# Patient Record
Sex: FEMALE | Race: WHITE | NOT HISPANIC OR LATINO | Employment: OTHER | URBAN - METROPOLITAN AREA
[De-identification: names, ages, dates, MRNs, and addresses within clinical notes are randomized per-mention and may not be internally consistent; named-entity substitution may affect disease eponyms.]

---

## 2023-05-03 PROBLEM — F33.9 RECURRENT MAJOR DEPRESSIVE DISORDER (CMS-HCC): Status: ACTIVE | Noted: 2023-05-03

## 2023-05-03 PROBLEM — E03.9 HYPOTHYROIDISM: Status: ACTIVE | Noted: 2023-05-03

## 2023-05-03 PROBLEM — F41.8 DEPRESSION WITH ANXIETY: Status: ACTIVE | Noted: 2023-05-03

## 2023-05-03 PROBLEM — J44.9 COPD (CHRONIC OBSTRUCTIVE PULMONARY DISEASE) (MULTI): Status: ACTIVE | Noted: 2023-05-03

## 2023-05-03 PROBLEM — R06.02 EXERTIONAL SHORTNESS OF BREATH: Status: ACTIVE | Noted: 2023-05-03

## 2023-05-03 PROBLEM — M81.8 ADULT IDIOPATHIC GENERALIZED OSTEOPOROSIS: Status: ACTIVE | Noted: 2023-05-03

## 2023-05-03 PROBLEM — Q24.8 LEFT VENTRICULAR OUTFLOW TRACT OBSTRUCTION (HHS-HCC): Status: ACTIVE | Noted: 2023-05-03

## 2023-05-03 PROBLEM — C34.90 LUNG CANCER (MULTI): Status: ACTIVE | Noted: 2023-05-03

## 2023-05-03 PROBLEM — E78.00 HYPERCHOLESTEROLEMIA: Status: ACTIVE | Noted: 2023-05-03

## 2023-05-03 PROBLEM — K21.9 GERD (GASTROESOPHAGEAL REFLUX DISEASE): Status: ACTIVE | Noted: 2023-05-03

## 2023-05-03 PROBLEM — M79.7 FIBROMYALGIA: Status: ACTIVE | Noted: 2023-05-03

## 2023-05-03 PROBLEM — M10.9 GOUT: Status: ACTIVE | Noted: 2023-05-03

## 2023-05-03 PROBLEM — R32 URINARY INCONTINENCE: Status: ACTIVE | Noted: 2023-05-03

## 2023-05-03 PROBLEM — E61.1 IRON DEFICIENCY: Status: ACTIVE | Noted: 2023-05-03

## 2023-05-03 PROBLEM — G47.00 INSOMNIA: Status: ACTIVE | Noted: 2023-05-03

## 2023-05-03 RX ORDER — TRAZODONE HYDROCHLORIDE 50 MG/1
1 TABLET ORAL NIGHTLY PRN
COMMUNITY
Start: 2022-06-09 | End: 2023-06-21 | Stop reason: SDUPTHER

## 2023-05-03 RX ORDER — ALBUTEROL SULFATE 0.83 MG/ML
SOLUTION RESPIRATORY (INHALATION)
COMMUNITY
Start: 2022-11-21 | End: 2023-10-04 | Stop reason: ENTERED-IN-ERROR

## 2023-05-03 RX ORDER — FLUTICASONE PROPIONATE 50 MCG
1 SPRAY, SUSPENSION (ML) NASAL 2 TIMES DAILY
COMMUNITY
Start: 2022-05-27 | End: 2023-10-04 | Stop reason: ENTERED-IN-ERROR

## 2023-05-03 RX ORDER — OXYBUTYNIN CHLORIDE 5 MG/1
2 TABLET ORAL DAILY
COMMUNITY
Start: 2022-07-22 | End: 2023-06-16 | Stop reason: SDUPTHER

## 2023-05-03 RX ORDER — POTASSIUM CHLORIDE 600 MG/1
1 CAPSULE, EXTENDED RELEASE ORAL 2 TIMES DAILY
COMMUNITY
Start: 2022-04-21 | End: 2024-03-21 | Stop reason: ALTCHOICE

## 2023-05-03 RX ORDER — FERROUS SULFATE 325(65) MG
1 TABLET ORAL DAILY
COMMUNITY
Start: 2022-04-11 | End: 2023-10-04 | Stop reason: ENTERED-IN-ERROR

## 2023-05-03 RX ORDER — ALLOPURINOL 100 MG/1
1 TABLET ORAL DAILY
COMMUNITY
Start: 2022-09-09 | End: 2023-05-05 | Stop reason: WASHOUT

## 2023-05-03 RX ORDER — ATORVASTATIN CALCIUM 40 MG/1
1 TABLET, FILM COATED ORAL DAILY
COMMUNITY
Start: 2017-02-09 | End: 2023-06-21 | Stop reason: SDUPTHER

## 2023-05-03 RX ORDER — DULOXETIN HYDROCHLORIDE 30 MG/1
1 CAPSULE, DELAYED RELEASE ORAL DAILY
COMMUNITY
Start: 2021-02-16 | End: 2023-06-21 | Stop reason: SDUPTHER

## 2023-05-03 RX ORDER — ALENDRONATE SODIUM 70 MG/1
TABLET ORAL
COMMUNITY
Start: 2018-08-22 | End: 2023-06-21 | Stop reason: SDUPTHER

## 2023-05-03 RX ORDER — OMEPRAZOLE 40 MG/1
1 CAPSULE, DELAYED RELEASE ORAL 2 TIMES DAILY
COMMUNITY
Start: 2022-06-29 | End: 2023-06-21 | Stop reason: SDUPTHER

## 2023-05-03 RX ORDER — ELECTROLYTES/DEXTROSE
1 SOLUTION, ORAL ORAL DAILY
COMMUNITY
Start: 2022-04-11 | End: 2024-03-12 | Stop reason: WASHOUT

## 2023-05-03 RX ORDER — LEVOTHYROXINE SODIUM 125 UG/1
1 TABLET ORAL DAILY
COMMUNITY
Start: 2022-02-16 | End: 2023-06-16 | Stop reason: SDUPTHER

## 2023-05-03 RX ORDER — UMECLIDINIUM BROMIDE AND VILANTEROL TRIFENATATE 62.5; 25 UG/1; UG/1
1 POWDER RESPIRATORY (INHALATION) DAILY
COMMUNITY
Start: 2017-02-09

## 2023-05-03 RX ORDER — METOPROLOL SUCCINATE 50 MG/1
1 TABLET, EXTENDED RELEASE ORAL DAILY
COMMUNITY
Start: 2022-07-07 | End: 2023-11-21 | Stop reason: WASHOUT

## 2023-05-03 RX ORDER — MULTIVITAMIN
1 TABLET ORAL DAILY
COMMUNITY
Start: 2022-04-11 | End: 2024-03-12 | Stop reason: WASHOUT

## 2023-05-03 RX ORDER — GABAPENTIN 300 MG/1
1 CAPSULE ORAL 4 TIMES DAILY
COMMUNITY
Start: 2018-07-23 | End: 2023-06-21 | Stop reason: SDUPTHER

## 2023-05-03 RX ORDER — CHOLECALCIFEROL (VITAMIN D3) 125 MCG
1 CAPSULE ORAL DAILY
COMMUNITY
End: 2024-03-21 | Stop reason: ALTCHOICE

## 2023-05-04 ENCOUNTER — OFFICE VISIT (OUTPATIENT)
Dept: PRIMARY CARE | Facility: CLINIC | Age: 73
End: 2023-05-04
Payer: MEDICARE

## 2023-05-04 VITALS
SYSTOLIC BLOOD PRESSURE: 123 MMHG | DIASTOLIC BLOOD PRESSURE: 81 MMHG | TEMPERATURE: 97.4 F | BODY MASS INDEX: 31.71 KG/M2 | HEART RATE: 86 BPM | WEIGHT: 162.38 LBS

## 2023-05-04 DIAGNOSIS — M79.7 FIBROMYALGIA: ICD-10-CM

## 2023-05-04 DIAGNOSIS — R13.19 ESOPHAGEAL DYSPHAGIA: Primary | ICD-10-CM

## 2023-05-04 DIAGNOSIS — Z00.00 HEALTHCARE MAINTENANCE: ICD-10-CM

## 2023-05-04 DIAGNOSIS — J44.9 CHRONIC OBSTRUCTIVE PULMONARY DISEASE, UNSPECIFIED COPD TYPE (MULTI): ICD-10-CM

## 2023-05-04 DIAGNOSIS — Z00.00 ROUTINE GENERAL MEDICAL EXAMINATION AT HEALTH CARE FACILITY: ICD-10-CM

## 2023-05-04 DIAGNOSIS — E03.9 HYPOTHYROIDISM, UNSPECIFIED TYPE: ICD-10-CM

## 2023-05-04 DIAGNOSIS — M10.9 GOUT, UNSPECIFIED CAUSE, UNSPECIFIED CHRONICITY, UNSPECIFIED SITE: ICD-10-CM

## 2023-05-04 DIAGNOSIS — M81.0 OSTEOPOROSIS, UNSPECIFIED OSTEOPOROSIS TYPE, UNSPECIFIED PATHOLOGICAL FRACTURE PRESENCE: ICD-10-CM

## 2023-05-04 DIAGNOSIS — I48.0 PAROXYSMAL ATRIAL FIBRILLATION (MULTI): ICD-10-CM

## 2023-05-04 DIAGNOSIS — Z12.31 BREAST CANCER SCREENING BY MAMMOGRAM: ICD-10-CM

## 2023-05-04 DIAGNOSIS — C34.91 MALIGNANT NEOPLASM OF RIGHT LUNG, UNSPECIFIED PART OF LUNG (MULTI): ICD-10-CM

## 2023-05-04 DIAGNOSIS — G47.00 INSOMNIA, UNSPECIFIED TYPE: ICD-10-CM

## 2023-05-04 DIAGNOSIS — Z12.39 BREAST SCREENING: ICD-10-CM

## 2023-05-04 DIAGNOSIS — R32 URINARY INCONTINENCE, UNSPECIFIED TYPE: ICD-10-CM

## 2023-05-04 DIAGNOSIS — E78.00 HYPERCHOLESTEROLEMIA: ICD-10-CM

## 2023-05-04 PROBLEM — F33.9 RECURRENT MAJOR DEPRESSIVE DISORDER (CMS-HCC): Status: RESOLVED | Noted: 2023-05-03 | Resolved: 2023-05-04

## 2023-05-04 PROCEDURE — 1159F MED LIST DOCD IN RCRD: CPT | Performed by: INTERNAL MEDICINE

## 2023-05-04 PROCEDURE — 1157F ADVNC CARE PLAN IN RCRD: CPT | Performed by: INTERNAL MEDICINE

## 2023-05-04 PROCEDURE — 1170F FXNL STATUS ASSESSED: CPT | Performed by: INTERNAL MEDICINE

## 2023-05-04 PROCEDURE — 1036F TOBACCO NON-USER: CPT | Performed by: INTERNAL MEDICINE

## 2023-05-04 PROCEDURE — 99214 OFFICE O/P EST MOD 30 MIN: CPT | Performed by: INTERNAL MEDICINE

## 2023-05-04 PROCEDURE — 1160F RVW MEDS BY RX/DR IN RCRD: CPT | Performed by: INTERNAL MEDICINE

## 2023-05-04 PROCEDURE — G0439 PPPS, SUBSEQ VISIT: HCPCS | Performed by: INTERNAL MEDICINE

## 2023-05-04 ASSESSMENT — PATIENT HEALTH QUESTIONNAIRE - PHQ9
1. LITTLE INTEREST OR PLEASURE IN DOING THINGS: NOT AT ALL
SUM OF ALL RESPONSES TO PHQ9 QUESTIONS 1 AND 2: 0
2. FEELING DOWN, DEPRESSED OR HOPELESS: NOT AT ALL

## 2023-05-04 ASSESSMENT — ACTIVITIES OF DAILY LIVING (ADL)
DRESSING: INDEPENDENT
MANAGING_FINANCES: INDEPENDENT
GROCERY_SHOPPING: INDEPENDENT
DOING_HOUSEWORK: INDEPENDENT
BATHING: INDEPENDENT
TAKING_MEDICATION: INDEPENDENT

## 2023-05-04 ASSESSMENT — ENCOUNTER SYMPTOMS
OCCASIONAL FEELINGS OF UNSTEADINESS: 1
LOSS OF SENSATION IN FEET: 0
DEPRESSION: 0

## 2023-05-04 NOTE — ASSESSMENT & PLAN NOTE
On allopurinol 100mg which is controlling her gouty flares, none recent.   Recheck Uric acid levels.

## 2023-05-04 NOTE — ASSESSMENT & PLAN NOTE
On oxybutynin 5mg BID though interested in trial with reduction in frequency given side effects of dry mouth.

## 2023-05-04 NOTE — ASSESSMENT & PLAN NOTE
Followed by Dr. Field with flare in December maintained on anoro ellipta and albuterol. Currently stable, has not had recurrent flares. Still with dyspnea on exertion though improving. Consider followup with him as cardiology workup thus far unrevealijng.

## 2023-05-04 NOTE — PATIENT INSTRUCTIONS
It was a pleasure to see you today! Here is a list of things we have discussed and to follow up on:     Shortness of breath on exertion - consider followup with Dr. Field   Gabapentin - consider reducing dose to 1 tablet in the morning and 1 tablet in the evening.   For sleep - when waking up in the night instead of getting up, try meditation. They have applications on your phone that you can download to help train you with this. Some of these apps are Calm and Headspace (there are multiple others).   Bloodwork - before getting your bloodwork, be sure to be off any biotin containing supplements for 2-3 days.   Followup balance training exercises   Schedule a dentist appointment   Schedule your second COVID bivalent booster.   Consider scheduling with podiatry to cut your nails - I believe this would be covered by your insurance.

## 2023-05-04 NOTE — ASSESSMENT & PLAN NOTE
S/p right lung resection in 2020 s/p chemotherapy has upcoming appointment scheduled for CT chest with IV contrast (every 6 months) followed by Dr. Iglesias

## 2023-05-04 NOTE — ASSESSMENT & PLAN NOTE
">>ASSESSMENT AND PLAN FOR ESOPHAGEAL DYSPHAGIA WRITTEN ON 5/4/2023 11:21 AM BY RASHID CONTRERAS, DO    Workup thus far unremarkable, though EGD notable for retained esophageal fluids, scheduled for manometry next week.   Continues to feel intermittent \"stuck\" sensation every few weeks, no alarm features.   "

## 2023-05-04 NOTE — ASSESSMENT & PLAN NOTE
"Workup thus far unremarkable, though EGD notable for retained esophageal fluids, scheduled for manometry next week.   Continues to feel intermittent \"stuck\" sensation every few weeks, no alarm features.   "

## 2023-05-04 NOTE — PROGRESS NOTES
Subjective   Reason for Visit: Breonna Greenberg is an 72 y.o. female here for a Medicare Wellness visit.   Patient is here for annual wellness visit and follow-up of chronic conditions.  She was last seen in January for establishment of care.   Past Medical, Surgical, and Family History reviewed and updated in chart.     HPI    PMHx:  - COPD advanced and dyspnea on exertion - followed by Dr. Field with flare in December improves with prednisone, on anoro ellipta, albuterol and nebulizers. Notes persistent dyspnea on exertion with prolonged walking, though much improved, has had subseqeunt workup with cardiology reportedly unremarkable.   - Lung cancer (NSCLC - SCC) s/p right lung resection in 2020 with negative findings of recurrence on surveillance imaging complicated by tumor abutting the right atrium, post op pneumonia and post operative pericarditis s/p 4 cycles of adjuvant cisplatin/docetaxel with G-CSF support completed 12/20, last seen by onc Dr. Iglesias in November recommended repeat CT every 6 months with IV contrast to evaluate the hilar lymph nodes and liver parenchyma.  - Residual chest wall pain and fibromyalgia - on gabapentin 100mg followed by pain management   - Paroxysmal AFib precipitated by surgery not recommended AC followed by cardiology last seen in March recommended 6-month follow-up Dr. Carroll, also follows with Dr. Ramos.   - Osteoporosis last DEXA 2018 on alendronate started at that time.  - CAD- LV tract outflow obstruction seen by cardiology in September recommended metoprolol followed by Dr. Sander Carroll   - HTN   - Hypothyroidism on levothyroxine 125mcg   - HLD on atorvastatin 40 mg  - Fibromyalgia, depression and anxiety followed by pain management on cymbalta 30mg and gabapentin 600mg BID   - Insomnia on trazodone 50mg for sleep with limited success, has trouble falling and staying asleep, gets up and has a cup of coffee and snack, plays games and then back to bed. Working on  retraining.   - Right knee crystal arthropathy seen by Dr. Cheng in September s/p steroid injection recommended continuation of allopurinol and voltaren, seen by Dr. Ospina thought related to OA recommended conservative measures. She was at the point where she was unable to ambulate. She continues to experience pain which hurts but its bearable.   - Hiatal hernia and GERD - followed by Dr. Livingston last EGD 6/29/22on omeprazole 40mg   -Esophageal dysphagia to solids and liquids seen by GI recommended manometry study, EGD showing retained esophageal fluid,  -GERD on omeprazole reduced to 40 mg daily  - Urinary urge incontinence on oxybutynin 5mg with significant improvement in symptoms  -Iron deficiency without anemia recommended increased iron intake  - Gout - on allopurinol last flare many years ago.     Lifestyle   - Diet - eats very few carbs except for breakfast, has trouble to lose weight, not on any particular diet but lunches are generally a salad or vegetables. Dinner is some meat and a salad or vegetables. No other snacking, late night eating   - Exercise - involved in senior center, playing chair volleyball and planning to play onkea     Social;   - Lives at home alone   - 3 children, oldest daughter a  in [place], middle daughter an  in [place], youngest son a  in [place]   - Niece an OB/GYN at [facility]   - Started getting back involved with the senior center.       Patient Care Team:  Gloria Mitchell DO as PCP - General  Desiree Chandler MD as PCP - [employer] PCP     Review of Systems  General: No constitutional symptoms, significant changes in weight  HEENT: No headaches, changes in vision or hearing, no sinus or dental issues   Cardiac: No chest pain, palpitations, dyspnea on exertion   Lungs: No cough, wheezing, shortness of breath   Abdomen: No abdominal pain, nausea/vomiting, diarrhea or constipation   : No urinary  complaints   Musculoskeletal: no joint pains,   Heme: No bleeding or thrombosis issues   Lymph: No swollen lymph glands   Skin: No rashes or lesions   Psych: No reported anxiety or depression   All other systems reviewed and are negative       Objective   Vitals:  /81   Pulse 86   Temp 36.3 °C (97.4 °F)   Wt 73.7 kg (162 lb 6 oz)   BMI 31.71 kg/m²       Physical Exam  General: Appears comfortable, NAD, appropriate affect  HEENT: NCAT, EOMI, pupils symmetric, no conjunctival erythema   Neck: Supple, no LAD   Heart: RRR S1 S2 no murmurs appreciated   Lungs: CTA bilaterally, no rhonchi, rales, or wheezes   Abdomen: Soft, NT/ND, no rebound or guarding, NABS   Extremities: no cyanosis or edema appreciated  Neuro: AAO x 3, answers questions appropriately, no FND, gait unremarkable    Assessment/Plan   Problem List Items Addressed This Visit          Nervous    Insomnia    Current Assessment & Plan     Intermittent use of trazodone            Respiratory    COPD (chronic obstructive pulmonary disease) (CMS/[facility])    Current Assessment & Plan     Followed by Dr. Field with flare in December maintained on anoro ellipta and albuterol. Currently stable, has not had recurrent flares. Still with dyspnea on exertion though improving. Consider followup with him as cardiology workup thus far unrevealijng.          Lung cancer (CMS/[facility])    Current Assessment & Plan     S/p right lung resection in 2020 s/p chemotherapy has upcoming appointment scheduled for CT chest with IV contrast (every 6 months) followed by Dr. Iglesias             Circulatory    Paroxysmal atrial fibrillation (CMS/[facility])    Current Assessment & Plan     Precipitated by surgery not recommended anticoagulation followed by cardiology          Relevant Medications    metoprolol succinate XL (Toprol-XL) 50 mg 24 hr tablet       Digestive    Esophageal dysphagia - Primary    Current Assessment & Plan     Workup thus far unremarkable, though EGD notable for  "retained esophageal fluids, scheduled for manometry next week.   Continues to feel intermittent \"stuck\" sensation every few weeks, no alarm features.             Genitourinary    Urinary incontinence    Current Assessment & Plan     On oxybutynin 5mg BID though interested in trial with reduction in frequency given side effects of dry mouth.          Relevant Orders    Urinalysis with Reflex Microscopic       Musculoskeletal    Fibromyalgia    Current Assessment & Plan     Maintained on cymbalta 30mg unclear if helping her symptoms. Denies symptoms of depression.          Osteoporosis    Current Assessment & Plan     On alendronate from 2018 due for repeat bone density          Relevant Orders    XR DEXA bone density    Vitamin D 25-Hydroxy,Total       Endocrine/Metabolic    Hypothyroidism    Current Assessment & Plan     Dose changed to 125mcg at last visit, due for rechecking.          Relevant Orders    TSH with reflex to Free T4 if abnormal       Other    Gout    Current Assessment & Plan     On allopurinol 100mg which is controlling her gouty flares, none recent.   Recheck Uric acid levels.          Relevant Orders    Uric acid    Hypercholesterolemia    Current Assessment & Plan     On atorvastatin 40 mg, repeat lipid profile          Relevant Orders    Lipid Panel    CBC and Auto Differential    Comprehensive Metabolic Panel     Other Visit Diagnoses       Breast screening        Relevant Orders    BI mammo bilateral screening tomosynthesis    Healthcare maintenance        Relevant Orders    Follow Up In [facility] - PCP    Breast cancer screening by mammogram        Relevant Orders    BI mammo bilateral screening tomosynthesis             Health maintenance   - Colonoscopy 3/23  - Mammogram 5/22   - Paps no longer required   - DEXA 8/18   - Immunizations: UTD with flu, pneumococcal, shingrix, due for covid booster.   - Skin - consider derm referral     "

## 2023-06-16 DIAGNOSIS — K21.9 GASTROESOPHAGEAL REFLUX DISEASE, UNSPECIFIED WHETHER ESOPHAGITIS PRESENT: ICD-10-CM

## 2023-06-16 DIAGNOSIS — G47.00 INSOMNIA, UNSPECIFIED TYPE: ICD-10-CM

## 2023-06-16 DIAGNOSIS — E78.00 HYPERCHOLESTEROLEMIA: ICD-10-CM

## 2023-06-16 DIAGNOSIS — E03.9 HYPOTHYROIDISM, UNSPECIFIED TYPE: Primary | ICD-10-CM

## 2023-06-16 DIAGNOSIS — M79.7 FIBROMYALGIA: ICD-10-CM

## 2023-06-16 DIAGNOSIS — R32 URINARY INCONTINENCE, UNSPECIFIED TYPE: ICD-10-CM

## 2023-06-16 DIAGNOSIS — M81.0 OSTEOPOROSIS, UNSPECIFIED OSTEOPOROSIS TYPE, UNSPECIFIED PATHOLOGICAL FRACTURE PRESENCE: ICD-10-CM

## 2023-06-18 RX ORDER — LEVOTHYROXINE SODIUM 125 UG/1
125 TABLET ORAL DAILY
Qty: 30 TABLET | Refills: 0 | Status: SHIPPED | OUTPATIENT
Start: 2023-06-18 | End: 2023-07-01

## 2023-06-18 RX ORDER — OXYBUTYNIN CHLORIDE 5 MG/1
10 TABLET ORAL DAILY
Qty: 90 TABLET | Refills: 1 | Status: SHIPPED | OUTPATIENT
Start: 2023-06-18 | End: 2023-10-05

## 2023-06-21 RX ORDER — ALENDRONATE SODIUM 70 MG/1
70 TABLET ORAL
Qty: 12 TABLET | Refills: 0 | Status: SHIPPED | OUTPATIENT
Start: 2023-06-21 | End: 2023-10-02

## 2023-06-21 RX ORDER — TRAZODONE HYDROCHLORIDE 50 MG/1
50 TABLET ORAL NIGHTLY PRN
Qty: 90 TABLET | Refills: 0 | Status: SHIPPED | OUTPATIENT
Start: 2023-06-21 | End: 2023-08-23 | Stop reason: SDUPTHER

## 2023-06-21 RX ORDER — DULOXETIN HYDROCHLORIDE 30 MG/1
30 CAPSULE, DELAYED RELEASE ORAL DAILY
Qty: 90 CAPSULE | Refills: 1 | Status: SHIPPED | OUTPATIENT
Start: 2023-06-21 | End: 2024-03-01 | Stop reason: SDUPTHER

## 2023-06-21 RX ORDER — ATORVASTATIN CALCIUM 40 MG/1
40 TABLET, FILM COATED ORAL DAILY
Qty: 90 TABLET | Refills: 1 | Status: SHIPPED | OUTPATIENT
Start: 2023-06-21 | End: 2023-08-29

## 2023-06-21 RX ORDER — GABAPENTIN 300 MG/1
300 CAPSULE ORAL 3 TIMES DAILY
Qty: 270 CAPSULE | Refills: 0 | Status: SHIPPED | OUTPATIENT
Start: 2023-06-21 | End: 2023-08-23 | Stop reason: SDUPTHER

## 2023-06-21 RX ORDER — OMEPRAZOLE 40 MG/1
40 CAPSULE, DELAYED RELEASE ORAL 2 TIMES DAILY
Qty: 90 CAPSULE | Refills: 1 | Status: SHIPPED | OUTPATIENT
Start: 2023-06-21 | End: 2023-10-04 | Stop reason: ENTERED-IN-ERROR

## 2023-06-26 ENCOUNTER — LAB (OUTPATIENT)
Dept: LAB | Facility: LAB | Age: 73
End: 2023-06-26
Payer: MEDICARE

## 2023-06-26 DIAGNOSIS — M10.9 GOUT, UNSPECIFIED CAUSE, UNSPECIFIED CHRONICITY, UNSPECIFIED SITE: ICD-10-CM

## 2023-06-26 DIAGNOSIS — E03.9 HYPOTHYROIDISM, UNSPECIFIED TYPE: ICD-10-CM

## 2023-06-26 DIAGNOSIS — M81.0 OSTEOPOROSIS, UNSPECIFIED OSTEOPOROSIS TYPE, UNSPECIFIED PATHOLOGICAL FRACTURE PRESENCE: ICD-10-CM

## 2023-06-26 DIAGNOSIS — R32 URINARY INCONTINENCE, UNSPECIFIED TYPE: ICD-10-CM

## 2023-06-26 DIAGNOSIS — E78.00 HYPERCHOLESTEROLEMIA: ICD-10-CM

## 2023-06-26 LAB
ALANINE AMINOTRANSFERASE (SGPT) (U/L) IN SER/PLAS: 20 U/L (ref 7–45)
ALBUMIN (G/DL) IN SER/PLAS: 4.6 G/DL (ref 3.4–5)
ALKALINE PHOSPHATASE (U/L) IN SER/PLAS: 87 U/L (ref 33–136)
ANION GAP IN SER/PLAS: 14 MMOL/L (ref 10–20)
APPEARANCE, URINE: CLEAR
ASPARTATE AMINOTRANSFERASE (SGOT) (U/L) IN SER/PLAS: 32 U/L (ref 9–39)
BASOPHILS (10*3/UL) IN BLOOD BY AUTOMATED COUNT: 0.04 X10E9/L (ref 0–0.1)
BASOPHILS/100 LEUKOCYTES IN BLOOD BY AUTOMATED COUNT: 0.5 % (ref 0–2)
BILIRUBIN TOTAL (MG/DL) IN SER/PLAS: 0.9 MG/DL (ref 0–1.2)
BILIRUBIN, URINE: NEGATIVE
BLOOD, URINE: ABNORMAL
CALCIDIOL (25 OH VITAMIN D3) (NG/ML) IN SER/PLAS: 67 NG/ML
CALCIUM (MG/DL) IN SER/PLAS: 10.1 MG/DL (ref 8.6–10.6)
CARBON DIOXIDE, TOTAL (MMOL/L) IN SER/PLAS: 30 MMOL/L (ref 21–32)
CHLORIDE (MMOL/L) IN SER/PLAS: 101 MMOL/L (ref 98–107)
CHOLESTEROL (MG/DL) IN SER/PLAS: 212 MG/DL (ref 0–199)
CHOLESTEROL IN HDL (MG/DL) IN SER/PLAS: 63 MG/DL
CHOLESTEROL/HDL RATIO: 3.4
COLOR, URINE: ABNORMAL
CREATININE (MG/DL) IN SER/PLAS: 0.91 MG/DL (ref 0.5–1.05)
EOSINOPHILS (10*3/UL) IN BLOOD BY AUTOMATED COUNT: 0.23 X10E9/L (ref 0–0.4)
EOSINOPHILS/100 LEUKOCYTES IN BLOOD BY AUTOMATED COUNT: 3 % (ref 0–6)
ERYTHROCYTE DISTRIBUTION WIDTH (RATIO) BY AUTOMATED COUNT: 13.3 % (ref 11.5–14.5)
ERYTHROCYTE MEAN CORPUSCULAR HEMOGLOBIN CONCENTRATION (G/DL) BY AUTOMATED: 32.5 G/DL (ref 32–36)
ERYTHROCYTE MEAN CORPUSCULAR VOLUME (FL) BY AUTOMATED COUNT: 92 FL (ref 80–100)
ERYTHROCYTES (10*6/UL) IN BLOOD BY AUTOMATED COUNT: 4.54 X10E12/L (ref 4–5.2)
GFR FEMALE: 67 ML/MIN/1.73M2
GLUCOSE (MG/DL) IN SER/PLAS: 84 MG/DL (ref 74–99)
GLUCOSE, URINE: NEGATIVE MG/DL
HEMATOCRIT (%) IN BLOOD BY AUTOMATED COUNT: 41.9 % (ref 36–46)
HEMOGLOBIN (G/DL) IN BLOOD: 13.6 G/DL (ref 12–16)
IMMATURE GRANULOCYTES/100 LEUKOCYTES IN BLOOD BY AUTOMATED COUNT: 0.7 % (ref 0–0.9)
KETONES, URINE: NEGATIVE MG/DL
LDL: 123 MG/DL (ref 0–99)
LEUKOCYTE ESTERASE, URINE: ABNORMAL
LEUKOCYTES (10*3/UL) IN BLOOD BY AUTOMATED COUNT: 7.6 X10E9/L (ref 4.4–11.3)
LYMPHOCYTES (10*3/UL) IN BLOOD BY AUTOMATED COUNT: 1.2 X10E9/L (ref 0.8–3)
LYMPHOCYTES/100 LEUKOCYTES IN BLOOD BY AUTOMATED COUNT: 15.8 % (ref 13–44)
MONOCYTES (10*3/UL) IN BLOOD BY AUTOMATED COUNT: 0.56 X10E9/L (ref 0.05–0.8)
MONOCYTES/100 LEUKOCYTES IN BLOOD BY AUTOMATED COUNT: 7.4 % (ref 2–10)
NEUTROPHILS (10*3/UL) IN BLOOD BY AUTOMATED COUNT: 5.5 X10E9/L (ref 1.6–5.5)
NEUTROPHILS/100 LEUKOCYTES IN BLOOD BY AUTOMATED COUNT: 72.6 % (ref 40–80)
NITRITE, URINE: NEGATIVE
NRBC (PER 100 WBCS) BY AUTOMATED COUNT: 0 /100 WBC (ref 0–0)
PH, URINE: 6 (ref 5–8)
PLATELETS (10*3/UL) IN BLOOD AUTOMATED COUNT: 199 X10E9/L (ref 150–450)
POTASSIUM (MMOL/L) IN SER/PLAS: 4.5 MMOL/L (ref 3.5–5.3)
PROTEIN TOTAL: 7.6 G/DL (ref 6.4–8.2)
PROTEIN, URINE: NEGATIVE MG/DL
RBC, URINE: <1 /HPF (ref 0–5)
SODIUM (MMOL/L) IN SER/PLAS: 140 MMOL/L (ref 136–145)
SPECIFIC GRAVITY, URINE: 1 (ref 1–1.03)
SQUAMOUS EPITHELIAL CELLS, URINE: <1 /HPF
THYROTROPIN (MIU/L) IN SER/PLAS BY DETECTION LIMIT <= 0.05 MIU/L: 21.78 MIU/L (ref 0.44–3.98)
THYROTROPIN (MIU/L) IN SER/PLAS BY DETECTION LIMIT <= 0.05 MIU/L: NORMAL
THYROXINE (T4) FREE (NG/DL) IN SER/PLAS: 1.14 NG/DL (ref 0.78–1.48)
TRIGLYCERIDE (MG/DL) IN SER/PLAS: 132 MG/DL (ref 0–149)
URATE (MG/DL) IN SER/PLAS: 5.9 MG/DL (ref 2.3–6.7)
UREA NITROGEN (MG/DL) IN SER/PLAS: 18 MG/DL (ref 6–23)
UROBILINOGEN, URINE: <2 MG/DL (ref 0–1.9)
VLDL: 26 MG/DL (ref 0–40)
WBC, URINE: <1 /HPF (ref 0–5)

## 2023-06-26 PROCEDURE — 80053 COMPREHEN METABOLIC PANEL: CPT

## 2023-06-26 PROCEDURE — 81001 URINALYSIS AUTO W/SCOPE: CPT

## 2023-06-26 PROCEDURE — 84439 ASSAY OF FREE THYROXINE: CPT

## 2023-06-26 PROCEDURE — 82306 VITAMIN D 25 HYDROXY: CPT

## 2023-06-26 PROCEDURE — 84443 ASSAY THYROID STIM HORMONE: CPT

## 2023-06-26 PROCEDURE — 80061 LIPID PANEL: CPT

## 2023-06-26 PROCEDURE — 84550 ASSAY OF BLOOD/URIC ACID: CPT

## 2023-06-26 PROCEDURE — 85025 COMPLETE CBC W/AUTO DIFF WBC: CPT

## 2023-06-26 PROCEDURE — 36415 COLL VENOUS BLD VENIPUNCTURE: CPT

## 2023-06-27 LAB — ANTI-NUCLEAR ANTIBODY (ANA): NEGATIVE

## 2023-07-01 DIAGNOSIS — E03.9 HYPOTHYROIDISM, UNSPECIFIED TYPE: Primary | ICD-10-CM

## 2023-07-01 RX ORDER — LEVOTHYROXINE SODIUM 150 UG/1
150 TABLET ORAL DAILY
Qty: 90 TABLET | Refills: 0 | Status: SHIPPED | OUTPATIENT
Start: 2023-07-01 | End: 2023-10-04 | Stop reason: ENTERED-IN-ERROR

## 2023-08-23 DIAGNOSIS — G47.00 INSOMNIA, UNSPECIFIED TYPE: ICD-10-CM

## 2023-08-23 DIAGNOSIS — M79.7 FIBROMYALGIA: ICD-10-CM

## 2023-08-23 RX ORDER — TRAZODONE HYDROCHLORIDE 50 MG/1
50 TABLET ORAL NIGHTLY PRN
Qty: 90 TABLET | Refills: 1 | Status: SHIPPED | OUTPATIENT
Start: 2023-08-23 | End: 2024-04-02 | Stop reason: WASHOUT

## 2023-08-23 RX ORDER — GABAPENTIN 300 MG/1
300 CAPSULE ORAL 2 TIMES DAILY
Qty: 180 CAPSULE | Refills: 1 | Status: SHIPPED | OUTPATIENT
Start: 2023-08-23 | End: 2023-11-21 | Stop reason: WASHOUT

## 2023-08-29 DIAGNOSIS — E78.00 HYPERCHOLESTEROLEMIA: ICD-10-CM

## 2023-08-29 RX ORDER — ATORVASTATIN CALCIUM 40 MG/1
40 TABLET, FILM COATED ORAL DAILY
Qty: 100 TABLET | Refills: 2 | Status: SHIPPED | OUTPATIENT
Start: 2023-08-29 | End: 2024-03-12 | Stop reason: WASHOUT

## 2023-10-01 DIAGNOSIS — M81.0 OSTEOPOROSIS, UNSPECIFIED OSTEOPOROSIS TYPE, UNSPECIFIED PATHOLOGICAL FRACTURE PRESENCE: ICD-10-CM

## 2023-10-02 RX ORDER — ALENDRONATE SODIUM 70 MG/1
TABLET ORAL
Qty: 12 TABLET | Refills: 3 | Status: SHIPPED | OUTPATIENT
Start: 2023-10-02 | End: 2024-03-12 | Stop reason: ALTCHOICE

## 2023-10-04 ENCOUNTER — APPOINTMENT (OUTPATIENT)
Dept: RADIOLOGY | Facility: HOSPITAL | Age: 73
End: 2023-10-04
Payer: MEDICARE

## 2023-10-04 ENCOUNTER — HOSPITAL ENCOUNTER (OUTPATIENT)
Facility: HOSPITAL | Age: 73
Setting detail: OBSERVATION
Discharge: HOME | End: 2023-10-05
Attending: STUDENT IN AN ORGANIZED HEALTH CARE EDUCATION/TRAINING PROGRAM
Payer: MEDICARE

## 2023-10-04 ENCOUNTER — TELEPHONE (OUTPATIENT)
Dept: PULMONOLOGY | Facility: CLINIC | Age: 73
End: 2023-10-04
Payer: MEDICARE

## 2023-10-04 DIAGNOSIS — R91.8 LUNG NODULES: ICD-10-CM

## 2023-10-04 DIAGNOSIS — R06.02 SHORTNESS OF BREATH: Primary | ICD-10-CM

## 2023-10-04 DIAGNOSIS — R91.8 LUNG MASS: ICD-10-CM

## 2023-10-04 LAB
ALBUMIN SERPL BCP-MCNC: 4.5 G/DL (ref 3.4–5)
ALP SERPL-CCNC: 92 U/L (ref 33–136)
ALT SERPL W P-5'-P-CCNC: 23 U/L (ref 7–45)
ANION GAP SERPL CALC-SCNC: 16 MMOL/L (ref 10–20)
AST SERPL W P-5'-P-CCNC: 33 U/L (ref 9–39)
BASE EXCESS BLDV CALC-SCNC: 6.2 MMOL/L (ref -2–3)
BASOPHILS # BLD AUTO: 0.04 X10*3/UL (ref 0–0.1)
BASOPHILS NFR BLD AUTO: 0.4 %
BILIRUB SERPL-MCNC: 0.5 MG/DL (ref 0–1.2)
BNP SERPL-MCNC: 67 PG/ML (ref 0–99)
BODY TEMPERATURE: 37 DEGREES CELSIUS
BUN SERPL-MCNC: 15 MG/DL (ref 6–23)
CALCIUM SERPL-MCNC: 9.7 MG/DL (ref 8.6–10.3)
CARDIAC TROPONIN I PNL SERPL HS: 7 NG/L (ref 0–13)
CARDIAC TROPONIN I PNL SERPL HS: 8 NG/L (ref 0–13)
CHLORIDE SERPL-SCNC: 101 MMOL/L (ref 98–107)
CO2 SERPL-SCNC: 27 MMOL/L (ref 21–32)
CREAT SERPL-MCNC: 0.96 MG/DL (ref 0.5–1.05)
EOSINOPHIL # BLD AUTO: 0.15 X10*3/UL (ref 0–0.4)
EOSINOPHIL NFR BLD AUTO: 1.7 %
ERYTHROCYTE [DISTWIDTH] IN BLOOD BY AUTOMATED COUNT: 13.4 % (ref 11.5–14.5)
GFR SERPL CREATININE-BSD FRML MDRD: 63 ML/MIN/1.73M*2
GLUCOSE SERPL-MCNC: 114 MG/DL (ref 74–99)
HCO3 BLDV-SCNC: 32.7 MMOL/L (ref 22–26)
HCT VFR BLD AUTO: 39.2 % (ref 36–46)
HGB BLD-MCNC: 12.4 G/DL (ref 12–16)
IMM GRANULOCYTES # BLD AUTO: 0.05 X10*3/UL (ref 0–0.5)
IMM GRANULOCYTES NFR BLD AUTO: 0.6 % (ref 0–0.9)
INHALED O2 CONCENTRATION: 21 %
LYMPHOCYTES # BLD AUTO: 1.26 X10*3/UL (ref 0.8–3)
LYMPHOCYTES NFR BLD AUTO: 13.9 %
MCH RBC QN AUTO: 30.1 PG (ref 26–34)
MCHC RBC AUTO-ENTMCNC: 31.6 G/DL (ref 32–36)
MCV RBC AUTO: 95 FL (ref 80–100)
MONOCYTES # BLD AUTO: 0.68 X10*3/UL (ref 0.05–0.8)
MONOCYTES NFR BLD AUTO: 7.5 %
NEUTROPHILS # BLD AUTO: 6.87 X10*3/UL (ref 1.6–5.5)
NEUTROPHILS NFR BLD AUTO: 75.9 %
NRBC BLD-RTO: 0 /100 WBCS (ref 0–0)
OXYHGB MFR BLDV: 41.5 % (ref 45–75)
PCO2 BLDV: 54 MM HG (ref 41–51)
PH BLDV: 7.39 PH (ref 7.33–7.43)
PLATELET # BLD AUTO: 194 X10*3/UL (ref 150–450)
PMV BLD AUTO: 10.8 FL (ref 7.5–11.5)
PO2 BLDV: 30 MM HG (ref 35–45)
POTASSIUM SERPL-SCNC: 3.6 MMOL/L (ref 3.5–5.3)
PROT SERPL-MCNC: 7.7 G/DL (ref 6.4–8.2)
RBC # BLD AUTO: 4.12 X10*6/UL (ref 4–5.2)
SAO2 % BLDV: 42 % (ref 45–75)
SODIUM SERPL-SCNC: 140 MMOL/L (ref 136–145)
TEST COMMENT: ABNORMAL
WBC # BLD AUTO: 9.1 X10*3/UL (ref 4.4–11.3)

## 2023-10-04 PROCEDURE — 83880 ASSAY OF NATRIURETIC PEPTIDE: CPT | Performed by: STUDENT IN AN ORGANIZED HEALTH CARE EDUCATION/TRAINING PROGRAM

## 2023-10-04 PROCEDURE — 99285 EMERGENCY DEPT VISIT HI MDM: CPT | Mod: 25 | Performed by: STUDENT IN AN ORGANIZED HEALTH CARE EDUCATION/TRAINING PROGRAM

## 2023-10-04 PROCEDURE — 2500000004 HC RX 250 GENERAL PHARMACY W/ HCPCS (ALT 636 FOR OP/ED): Performed by: STUDENT IN AN ORGANIZED HEALTH CARE EDUCATION/TRAINING PROGRAM

## 2023-10-04 PROCEDURE — 36415 COLL VENOUS BLD VENIPUNCTURE: CPT | Performed by: STUDENT IN AN ORGANIZED HEALTH CARE EDUCATION/TRAINING PROGRAM

## 2023-10-04 PROCEDURE — 84484 ASSAY OF TROPONIN QUANT: CPT | Performed by: STUDENT IN AN ORGANIZED HEALTH CARE EDUCATION/TRAINING PROGRAM

## 2023-10-04 PROCEDURE — 84484 ASSAY OF TROPONIN QUANT: CPT | Mod: 91 | Performed by: STUDENT IN AN ORGANIZED HEALTH CARE EDUCATION/TRAINING PROGRAM

## 2023-10-04 PROCEDURE — 2550000001 HC RX 255 CONTRASTS: Performed by: STUDENT IN AN ORGANIZED HEALTH CARE EDUCATION/TRAINING PROGRAM

## 2023-10-04 PROCEDURE — G0378 HOSPITAL OBSERVATION PER HR: HCPCS

## 2023-10-04 PROCEDURE — G1004 CDSM NDSC: HCPCS

## 2023-10-04 PROCEDURE — 94640 AIRWAY INHALATION TREATMENT: CPT

## 2023-10-04 PROCEDURE — 2500000002 HC RX 250 W HCPCS SELF ADMINISTERED DRUGS (ALT 637 FOR MEDICARE OP, ALT 636 FOR OP/ED): Performed by: STUDENT IN AN ORGANIZED HEALTH CARE EDUCATION/TRAINING PROGRAM

## 2023-10-04 PROCEDURE — 82805 BLOOD GASES W/O2 SATURATION: CPT | Performed by: STUDENT IN AN ORGANIZED HEALTH CARE EDUCATION/TRAINING PROGRAM

## 2023-10-04 PROCEDURE — 80053 COMPREHEN METABOLIC PANEL: CPT | Performed by: STUDENT IN AN ORGANIZED HEALTH CARE EDUCATION/TRAINING PROGRAM

## 2023-10-04 PROCEDURE — 2500000002 HC RX 250 W HCPCS SELF ADMINISTERED DRUGS (ALT 637 FOR MEDICARE OP, ALT 636 FOR OP/ED): Mod: MUE | Performed by: STUDENT IN AN ORGANIZED HEALTH CARE EDUCATION/TRAINING PROGRAM

## 2023-10-04 PROCEDURE — 71275 CT ANGIOGRAPHY CHEST: CPT | Performed by: STUDENT IN AN ORGANIZED HEALTH CARE EDUCATION/TRAINING PROGRAM

## 2023-10-04 PROCEDURE — 71045 X-RAY EXAM CHEST 1 VIEW: CPT | Mod: FY

## 2023-10-04 PROCEDURE — 85025 COMPLETE CBC W/AUTO DIFF WBC: CPT | Performed by: STUDENT IN AN ORGANIZED HEALTH CARE EDUCATION/TRAINING PROGRAM

## 2023-10-04 PROCEDURE — 71045 X-RAY EXAM CHEST 1 VIEW: CPT | Performed by: RADIOLOGY

## 2023-10-04 RX ORDER — LEVOTHYROXINE SODIUM 125 UG/1
125 TABLET ORAL
COMMUNITY
End: 2024-03-01 | Stop reason: SDUPTHER

## 2023-10-04 RX ORDER — PANTOPRAZOLE SODIUM 20 MG/1
20 TABLET, DELAYED RELEASE ORAL 2 TIMES DAILY
COMMUNITY
End: 2023-11-21 | Stop reason: WASHOUT

## 2023-10-04 RX ORDER — IPRATROPIUM BROMIDE AND ALBUTEROL SULFATE 2.5; .5 MG/3ML; MG/3ML
3 SOLUTION RESPIRATORY (INHALATION) EVERY 20 MIN
Status: COMPLETED | OUTPATIENT
Start: 2023-10-04 | End: 2023-10-04

## 2023-10-04 RX ORDER — PREDNISONE 20 MG/1
60 TABLET ORAL ONCE
Status: COMPLETED | OUTPATIENT
Start: 2023-10-04 | End: 2023-10-04

## 2023-10-04 RX ADMIN — IPRATROPIUM BROMIDE AND ALBUTEROL SULFATE 3 ML: .5; 3 SOLUTION RESPIRATORY (INHALATION) at 18:00

## 2023-10-04 RX ADMIN — PREDNISONE 60 MG: 20 TABLET ORAL at 18:27

## 2023-10-04 RX ADMIN — IPRATROPIUM BROMIDE AND ALBUTEROL SULFATE 3 ML: .5; 3 SOLUTION RESPIRATORY (INHALATION) at 17:50

## 2023-10-04 RX ADMIN — IPRATROPIUM BROMIDE AND ALBUTEROL SULFATE 3 ML: .5; 3 SOLUTION RESPIRATORY (INHALATION) at 18:25

## 2023-10-04 RX ADMIN — IOHEXOL 75 ML: 350 INJECTION, SOLUTION INTRAVENOUS at 18:44

## 2023-10-04 ASSESSMENT — PAIN DESCRIPTION - PROGRESSION: CLINICAL_PROGRESSION: NOT CHANGED

## 2023-10-04 ASSESSMENT — PAIN DESCRIPTION - DESCRIPTORS: DESCRIPTORS: STABBING

## 2023-10-04 ASSESSMENT — COLUMBIA-SUICIDE SEVERITY RATING SCALE - C-SSRS
1. IN THE PAST MONTH, HAVE YOU WISHED YOU WERE DEAD OR WISHED YOU COULD GO TO SLEEP AND NOT WAKE UP?: NO
6. HAVE YOU EVER DONE ANYTHING, STARTED TO DO ANYTHING, OR PREPARED TO DO ANYTHING TO END YOUR LIFE?: NO
2. HAVE YOU ACTUALLY HAD ANY THOUGHTS OF KILLING YOURSELF?: NO

## 2023-10-04 ASSESSMENT — PAIN SCALES - GENERAL
PAINLEVEL_OUTOF10: 0 - NO PAIN
PAINLEVEL_OUTOF10: 0 - NO PAIN

## 2023-10-04 ASSESSMENT — PAIN - FUNCTIONAL ASSESSMENT: PAIN_FUNCTIONAL_ASSESSMENT: 0-10

## 2023-10-04 NOTE — TELEPHONE ENCOUNTER
Patient sts she is having some sob on exertion (short distances), wheezing and coughing up lots of phlegm . Patient sts that her pulse ox has dropped as low as 75.    She has been wearing her O2 at 2lpm even while sitting. She is also currently wearing a heart monitor that has been in place for 2 weeks and comes off on Thursday October 5th.    Please call to advise.

## 2023-10-04 NOTE — ED PROVIDER NOTES
EMERGENCY MEDICINE EVALUATION NOTE    Chief Complaint:   Chief Complaint   Patient presents with    Shortness of Breath     3-4 days ago started with SOB.  Patient reports loss of voice multiple times.  Has seen cardio and gastro last week.   Spoke with pulmonologist today who told her to come in today        HPI: Breonna Greenberg is a 73 y.o. female with past medical history of lung cancer status postchemotherapy who presents with complaint of shortness of breath.  Patient states she has been dealing with intermittent shortness of breath on exertion for the past 3 years.  She states she did have a right lower lobe resection several years ago due to her lung cancer.  She states over the past 3 to 4 days she has had worsening exertional dyspnea and was had with a sudden episode of shortness of breath after walking outside today.  She states she is only able to walk a short distance now and usually is able to walk around a mile.  She does states she used a breathing treatment at home and did have some relief and states she only uses a breathing treatment every once in a while and not a daily occurrence.  She denies any associated chest pain, fever, chills, cough, abdominal pain, nausea, vomiting.    Previous History  No past medical history on file.  Past Surgical History:   Procedure Laterality Date    BACK SURGERY  2013    Back Surgery     SECTION, CLASSIC  2013     Section    CT ANGIO HEART CORONARY  2022    CT HEART CORONARY ANGIOGRAM 2022 [facility]CILLARY [facility]GACY    MR HEAD ANGIO WO IV CONTRAST  3/14/2018    [place] HEAD ANGIO WO [place] [facility] [place]    OTHER SURGICAL HISTORY  2020    Mediastinoscopy    OTHER SURGICAL HISTORY  2020    Thoracotomy    OTHER SURGICAL HISTORY  2020    Lung lobectomy     Social History     Tobacco Use    Smoking status: Never    Smokeless tobacco: Never   Substance Use Topics    Alcohol use: Never    Drug use: Never      No family history on file.  No Known Allergies  Current Outpatient Medications   Medication Instructions    albuterol sulfate (Proair Digihaler) 90 mcg/actuation aero powdr breath act w/sensor inhaler 2 puffs, inhalation, Every 6 hours PRN    alendronate (Fosamax) 70 mg tablet TAKE 1 TABLET BY MOUTH WEEKLY  WITH 8 OZ OF PLAIN WATER 30  MINUTES BEFORE FIRST FOOD, DRINK OR MEDS. STAY UPRIGHT FOR 30  MINS    allopurinol (Zyloprim) 100 mg tablet TAKE 1 TABLET BY MOUTH DAILY AS  DIRECTED    atorvastatin (LIPITOR) 40 mg, oral, Daily    biotin 5 mg capsule 1 capsule, oral, Daily    cholecalciferol (Vitamin D-3) 125 MCG (5000 UT) capsule 1 capsule, oral, Daily    DULoxetine (CYMBALTA) 30 mg, oral, Daily    gabapentin (NEURONTIN) 300 mg, oral, 2 times daily    levothyroxine (SYNTHROID, LEVOXYL) 125 mcg, oral, Daily before breakfast    metoprolol succinate XL (Toprol-XL) 50 mg 24 hr tablet 1 tablet, oral, Daily    multivitamin with folic acid (One Daily Multivitamin) 400 mcg tablet 1 tablet, oral, Daily    oxybutynin (DITROPAN) 10 mg, oral, Daily    pantoprazole (PROTONIX) 20 mg, oral, 2 times daily, Do not crush, chew, or split.    potassium chloride ER (Micro-K) 8 mEq ER capsule 1 capsule, oral, 2 times daily    traZODone (DESYREL) 50 mg, oral, Nightly PRN    umeclidinium-vilanteroL (Anoro Ellipta) 62.5-25 mcg/actuation blister with device 1 puff, inhalation, Daily       Physical Exam  Appearance: Alert, oriented , cooperative,  in acute distress. Well nourished & well hydrated.     Skin: Intact,  dry skin, no lesions, rash, petechiae or purpura.      Eyes: PERRLA, EOMs intact,  Conjunctiva pink with no redness or exudates. Cornea & anterior chamber are clear, Eyelids without lesions. No scleral icterus.      ENT: Hearing grossly intact. External auditory canals patent, tympanic membranes intact with visible landmarks. Nares patent, mucus membranes moist. Dentition without lesions. Pharynx clear, uvula midline.       Neck: Supple, without meningismus. Thyroid not palpable. Trachea at midline. No lymphadenopathy.     Pulmonary: Clear bilaterally with good chest wall excursion, diminished breath sounds in right lower lung fields. No rales, rhonchi or wheezing. No accessory muscle use or stridor.     Cardiac: Normal S1, S2 without murmur, rub, gallop or extrasystole. No JVD, Carotids without bruits.     Abdomen: Soft, nontender, active bowel sounds.  No palpable organomegaly.  No rebound or guarding.  No CVA tenderness.     Genitourinary: Exam deferred.     Musculoskeletal: Full range of motion. no pain, edema, or deformity. Pulses full and equal. No cyanosis or clubbing.      Neurological:  Cranial nerves II through XII are grossly intact, finger-nose touch is normal, normal sensation, no weakness, no focal findings identified.     Psychiatric: Appropriate mood and affect.      Results  Labs Reviewed   CBC WITH AUTO DIFFERENTIAL - Abnormal       Result Value    WBC 9.1      nRBC 0.0      RBC 4.12      Hemoglobin 12.4      Hematocrit 39.2      MCV 95      MCH 30.1      MCHC 31.6 (*)     RDW 13.4      Platelets 194      MPV 10.8      Neutrophils % 75.9      Immature Granulocytes %, Automated 0.6      Lymphocytes % 13.9      Monocytes % 7.5      Eosinophils % 1.7      Basophils % 0.4      Neutrophils Absolute 6.87 (*)     Immature Granulocytes Absolute, Automated 0.05      Lymphocytes Absolute 1.26      Monocytes Absolute 0.68      Eosinophils Absolute 0.15      Basophils Absolute 0.04     COMPREHENSIVE METABOLIC PANEL - Abnormal    Glucose 114 (*)     Sodium 140      Potassium 3.6      Chloride 101      Bicarbonate 27      Anion Gap 16      Urea Nitrogen 15      Creatinine 0.96      eGFR 63      Calcium 9.7      Albumin 4.5      Alkaline Phosphatase 92      Total Protein 7.7      AST 33      Bilirubin, Total 0.5      ALT 23     BLOOD GAS VENOUS - Abnormal    POCT pH, Venous 7.39      POCT pCO2, Venous 54 (*)     POCT pO2, Venous  30 (*)     POCT SO2, Venous 42 (*)     POCT Oxy Hemoglobin, Venous 41.5 (*)     POCT Base Excess, Venous 6.2 (*)     POCT HCO3 Calculated, Venous 32.7 (*)     Patient Temperature 37.0      FiO2 21      Test Comment [facility] LAB2-S     B-TYPE NATRIURETIC PEPTIDE - Normal    BNP 67      Narrative:        <100 pg/mL - Heart failure unlikely  100-299 pg/mL - Intermediate probability of acute heart                  failure exacerbation. Correlate with clinical                  context and patient history.    >=300 pg/mL - Heart Failure likely. Correlate with clinical                  context and patient history.    BNP testing is performed using different testing methodology at [facility] than at other [facility]. Direct result comparisons should only be made within the same method.      SERIAL TROPONIN-INITIAL - Normal    Troponin I, High Sensitivity 8      Narrative:     Less than 99th percentile of normal range cutoff-  Female and children under 18 years old <14 ng/L; Male <21 ng/L: Negative  Repeat testing should be performed if clinically indicated.     Female and children under 18 years old 14-50 ng/L; Male 21-50 ng/L:  Consistent with possible cardiac damage and possible increased clinical   risk. Serial measurements may help to assess extent of myocardial damage.     >50 ng/L: Consistent with cardiac damage, increased clinical risk and  myocardial infarction. Serial measurements may help assess extent of   myocardial damage.      NOTE: Children less than 1 year old may have higher baseline troponin   levels and results should be interpreted in conjunction with the overall   clinical context.     NOTE: Troponin I testing is performed using a different   testing methodology at [facility] than at other   [facility]. Direct result comparisons should only   be made within the same method.   SERIAL TROPONIN, 1 HOUR - Normal    Troponin I, High Sensitivity 7      Narrative:     Less  than 99th percentile of normal range cutoff-  Female and children under 18 years old <14 ng/L; Male <21 ng/L: Negative  Repeat testing should be performed if clinically indicated.     Female and children under 18 years old 14-50 ng/L; Male 21-50 ng/L:  Consistent with possible cardiac damage and possible increased clinical   risk. Serial measurements may help to assess extent of myocardial damage.     >50 ng/L: Consistent with cardiac damage, increased clinical risk and  myocardial infarction. Serial measurements may help assess extent of   myocardial damage.      NOTE: Children less than 1 year old may have higher baseline troponin   levels and results should be interpreted in conjunction with the overall   clinical context.     NOTE: Troponin I testing is performed using a different   testing methodology at [facility] than at other   [facility]. Direct result comparisons should only   be made within the same method.   TROPONIN SERIES- (INITIAL, 1 HR)    Narrative:     The following orders were created for panel order Troponin Series, (0, 1 HR).  Procedure                               Abnormality         Status                     ---------                               -----------         ------                     Troponin I, High Sensiti...[[id number]]  Normal              Final result               Troponin, High Sensitivi...[[id number]]  Normal              Final result                 Please view results for these tests on the individual orders.   GRAY TOP     XR chest 1 view   Final Result   1. Cardiomegaly. Mild interstitial edema.   2. Airspace opacity at the right lung base, probably corresponding to   the spiculated masslike opacity described on earlier same date CT   angiogram chest. PET/CT can be obtained to evaluate for tumor   recurrence.                  MACRO:   None.        Signed by: Venus Payton 10/4/2023 7:11 PM   Dictation workstation:   [id number]      CT angio chest  for pulmonary embolism   Final Result   1. In the interim since prior exam on 05/15/2023, new 2.4 x 2.2 cm   spiculated, masslike opacity is present along the pleura in the   inferior right lung with associated interstitial thickening and micro   nodularity along the periphery. Given history of previous right lung   carcinoma, finding is highly suspicious for local recurrence, until   proven otherwise.   2. Several additional new nodules/increased are present in the   inferior right lung and along the pleura in the left upper and left   lower lobe, nonspecific findings that may represent focal   infectious/inflammatory processes, such as bronchiolitis, although   metastatic disease is not entirely excluded.   3. No pulmonary embolism is identified.   4. No new enlarged lymphadenopathy is identified.   5. Patulous appearance of fluid-filled esophagus, similar in   appearance to prior exam, correlate with dysmotility.             MACRO:   None        Signed by: Seda Recinos 10/4/2023 7:00 PM   Dictation workstation:   [id number]            ED Course & Medical Decision Making  Medications   ipratropium-albuteroL (Duo-Neb) 0.5-2.5 mg/3 mL nebulizer solution 3 mL (3 mL nebulization Given 10/4/23 1825)   predniSONE (Deltasone) tablet 60 mg (60 mg oral Given 10/4/23 1827)   iohexol (OMNIPaque) 350 mg iodine/mL injection 75 mL (75 mL intravenous Given 10/4/23 1844)     Diagnoses as of 10/04/23 2240   Shortness of breath   Lung mass   Lung nodules       Visit Vitals  /77   Pulse 89   Temp 36.9 °C (98.4 °F)   Resp (!) 29       Breonna Greenberg is a 73 y.o. female with past medical history of lung cancer status postchemotherapy who presents with complaint of shortness of breath.  Patient initially tachycardic with a heart rate of 107, afebrile, normotensive, saturating well above 92% on room air.  Clear breath sounds throughout.  Differential does include but is not limited to ACS, PE, dissection, pneumonia,  CHF, malignancy.  EKG did show normal sinus rhythm with a rate of 86 bpm with some T wave inversions and flattening noted in the inferior and anterior leads similar compared to prior EKGs.  Normal BNP noted.  Initial repeat troponin negative.  No significant renal dysfunction, electrolyte normality, leukocytosis, or anemia.  His blood gas did show no significant acidosis although hypercapnia 54 noted.  She was given oral prednisone as well as multiple breathing treatments and did have significant relief in reexamination.  This could be a mild component of COPD although a CT PE protocol was obtained.  This did show evidence of a new 2.4 cm spiculated mass in the pleura along the right lung as well as multiple pulmonary nodules which are new.  No evidence of PE.  She still had exertional dyspnea on reexamination.  Given new onset mass and nodules and concern for metastatic or primary cancer she was admitted to the hospitalist for further management.    Procedures  Procedures    Diagnosis  1. Shortness of breath    2. Lung mass    3. Lung nodules        Disposition    Admitted to hospitalist team, discussed differential and findings with patient as well as any family members at bedside.      ED Prescriptions    None            Bao Toledo DO  10/04/23 1269

## 2023-10-05 VITALS
BODY MASS INDEX: 30.43 KG/M2 | RESPIRATION RATE: 18 BRPM | DIASTOLIC BLOOD PRESSURE: 61 MMHG | HEIGHT: 60 IN | HEART RATE: 101 BPM | WEIGHT: 155 LBS | SYSTOLIC BLOOD PRESSURE: 110 MMHG | OXYGEN SATURATION: 94 % | TEMPERATURE: 98.2 F

## 2023-10-05 DIAGNOSIS — R32 URINARY INCONTINENCE, UNSPECIFIED TYPE: ICD-10-CM

## 2023-10-05 PROBLEM — R06.02 SHORTNESS OF BREATH: Status: ACTIVE | Noted: 2023-10-05

## 2023-10-05 LAB
ANION GAP SERPL CALC-SCNC: 17 MMOL/L (ref 10–20)
BUN SERPL-MCNC: 17 MG/DL (ref 6–23)
CALCIUM SERPL-MCNC: 9.6 MG/DL (ref 8.6–10.3)
CHLORIDE SERPL-SCNC: 101 MMOL/L (ref 98–107)
CO2 SERPL-SCNC: 24 MMOL/L (ref 21–32)
CREAT SERPL-MCNC: 0.83 MG/DL (ref 0.5–1.05)
ERYTHROCYTE [DISTWIDTH] IN BLOOD BY AUTOMATED COUNT: 13.3 % (ref 11.5–14.5)
GFR SERPL CREATININE-BSD FRML MDRD: 75 ML/MIN/1.73M*2
GLUCOSE SERPL-MCNC: 134 MG/DL (ref 74–99)
HCT VFR BLD AUTO: 38.4 % (ref 36–46)
HGB BLD-MCNC: 12.5 G/DL (ref 12–16)
MCH RBC QN AUTO: 30.5 PG (ref 26–34)
MCHC RBC AUTO-ENTMCNC: 32.6 G/DL (ref 32–36)
MCV RBC AUTO: 94 FL (ref 80–100)
NRBC BLD-RTO: 0 /100 WBCS (ref 0–0)
PLATELET # BLD AUTO: 201 X10*3/UL (ref 150–450)
PMV BLD AUTO: 10.3 FL (ref 7.5–11.5)
POTASSIUM SERPL-SCNC: 3.8 MMOL/L (ref 3.5–5.3)
PROCALCITONIN SERPL-MCNC: <0.02 NG/ML
RBC # BLD AUTO: 4.1 X10*6/UL (ref 4–5.2)
SODIUM SERPL-SCNC: 138 MMOL/L (ref 136–145)
WBC # BLD AUTO: 6.4 X10*3/UL (ref 4.4–11.3)

## 2023-10-05 PROCEDURE — 96374 THER/PROPH/DIAG INJ IV PUSH: CPT

## 2023-10-05 PROCEDURE — 94640 AIRWAY INHALATION TREATMENT: CPT

## 2023-10-05 PROCEDURE — 80048 BASIC METABOLIC PNL TOTAL CA: CPT

## 2023-10-05 PROCEDURE — 2500000004 HC RX 250 GENERAL PHARMACY W/ HCPCS (ALT 636 FOR OP/ED)

## 2023-10-05 PROCEDURE — 99232 SBSQ HOSP IP/OBS MODERATE 35: CPT | Performed by: NURSE PRACTITIONER

## 2023-10-05 PROCEDURE — 2500000001 HC RX 250 WO HCPCS SELF ADMINISTERED DRUGS (ALT 637 FOR MEDICARE OP)

## 2023-10-05 PROCEDURE — 85027 COMPLETE CBC AUTOMATED: CPT

## 2023-10-05 PROCEDURE — 2500000002 HC RX 250 W HCPCS SELF ADMINISTERED DRUGS (ALT 637 FOR MEDICARE OP, ALT 636 FOR OP/ED)

## 2023-10-05 PROCEDURE — 36415 COLL VENOUS BLD VENIPUNCTURE: CPT | Mod: AHULAB

## 2023-10-05 PROCEDURE — 84145 PROCALCITONIN (PCT): CPT | Mod: AHULAB,CMCLAB

## 2023-10-05 PROCEDURE — G0378 HOSPITAL OBSERVATION PER HR: HCPCS

## 2023-10-05 PROCEDURE — 2500000001 HC RX 250 WO HCPCS SELF ADMINISTERED DRUGS (ALT 637 FOR MEDICARE OP): Performed by: NURSE PRACTITIONER

## 2023-10-05 PROCEDURE — 2500000002 HC RX 250 W HCPCS SELF ADMINISTERED DRUGS (ALT 637 FOR MEDICARE OP, ALT 636 FOR OP/ED): Mod: MUE

## 2023-10-05 PROCEDURE — 99223 1ST HOSP IP/OBS HIGH 75: CPT

## 2023-10-05 PROCEDURE — 2500000004 HC RX 250 GENERAL PHARMACY W/ HCPCS (ALT 636 FOR OP/ED): Mod: MUE

## 2023-10-05 PROCEDURE — 36415 COLL VENOUS BLD VENIPUNCTURE: CPT

## 2023-10-05 RX ORDER — ACETAMINOPHEN 325 MG/1
650 TABLET ORAL EVERY 4 HOURS PRN
Status: DISCONTINUED | OUTPATIENT
Start: 2023-10-05 | End: 2023-10-05 | Stop reason: HOSPADM

## 2023-10-05 RX ORDER — LEVOTHYROXINE SODIUM 125 UG/1
125 TABLET ORAL
Status: DISCONTINUED | OUTPATIENT
Start: 2023-10-05 | End: 2023-10-05 | Stop reason: HOSPADM

## 2023-10-05 RX ORDER — ENOXAPARIN SODIUM 100 MG/ML
40 INJECTION SUBCUTANEOUS EVERY 24 HOURS
Status: DISCONTINUED | OUTPATIENT
Start: 2023-10-05 | End: 2023-10-05 | Stop reason: HOSPADM

## 2023-10-05 RX ORDER — OXYBUTYNIN CHLORIDE 5 MG/1
10 TABLET ORAL DAILY
Status: DISCONTINUED | OUTPATIENT
Start: 2023-10-05 | End: 2023-10-05 | Stop reason: HOSPADM

## 2023-10-05 RX ORDER — TALC
3 POWDER (GRAM) TOPICAL NIGHTLY PRN
Status: DISCONTINUED | OUTPATIENT
Start: 2023-10-05 | End: 2023-10-05 | Stop reason: HOSPADM

## 2023-10-05 RX ORDER — ONDANSETRON HYDROCHLORIDE 2 MG/ML
4 INJECTION, SOLUTION INTRAVENOUS EVERY 8 HOURS PRN
Status: DISCONTINUED | OUTPATIENT
Start: 2023-10-05 | End: 2023-10-05 | Stop reason: HOSPADM

## 2023-10-05 RX ORDER — OXYBUTYNIN CHLORIDE 5 MG/1
5 TABLET ORAL DAILY
Qty: 90 TABLET | Refills: 1 | Status: SHIPPED | OUTPATIENT
Start: 2023-10-05 | End: 2024-01-23

## 2023-10-05 RX ORDER — ALBUTEROL SULFATE 0.83 MG/ML
2.5 SOLUTION RESPIRATORY (INHALATION) EVERY 4 HOURS PRN
Status: DISCONTINUED | OUTPATIENT
Start: 2023-10-05 | End: 2023-10-05 | Stop reason: HOSPADM

## 2023-10-05 RX ORDER — ACETAMINOPHEN 650 MG/1
650 SUPPOSITORY RECTAL EVERY 4 HOURS PRN
Status: DISCONTINUED | OUTPATIENT
Start: 2023-10-05 | End: 2023-10-05 | Stop reason: HOSPADM

## 2023-10-05 RX ORDER — ALLOPURINOL 100 MG/1
100 TABLET ORAL DAILY
Status: DISCONTINUED | OUTPATIENT
Start: 2023-10-05 | End: 2023-10-05 | Stop reason: HOSPADM

## 2023-10-05 RX ORDER — METOPROLOL SUCCINATE 50 MG/1
50 TABLET, EXTENDED RELEASE ORAL DAILY
Status: DISCONTINUED | OUTPATIENT
Start: 2023-10-05 | End: 2023-10-05 | Stop reason: HOSPADM

## 2023-10-05 RX ORDER — ALBUTEROL SULFATE 90 UG/1
2 AEROSOL, METERED RESPIRATORY (INHALATION) EVERY 4 HOURS PRN
Status: DISCONTINUED | OUTPATIENT
Start: 2023-10-05 | End: 2023-10-05 | Stop reason: CLARIF

## 2023-10-05 RX ORDER — IPRATROPIUM BROMIDE AND ALBUTEROL SULFATE 2.5; .5 MG/3ML; MG/3ML
3 SOLUTION RESPIRATORY (INHALATION)
Status: DISCONTINUED | OUTPATIENT
Start: 2023-10-05 | End: 2023-10-05

## 2023-10-05 RX ORDER — GABAPENTIN 300 MG/1
300 CAPSULE ORAL 2 TIMES DAILY
Status: DISCONTINUED | OUTPATIENT
Start: 2023-10-05 | End: 2023-10-05 | Stop reason: HOSPADM

## 2023-10-05 RX ORDER — ATORVASTATIN CALCIUM 40 MG/1
40 TABLET, FILM COATED ORAL DAILY
Status: DISCONTINUED | OUTPATIENT
Start: 2023-10-05 | End: 2023-10-05 | Stop reason: HOSPADM

## 2023-10-05 RX ORDER — ONDANSETRON 4 MG/1
4 TABLET, ORALLY DISINTEGRATING ORAL EVERY 8 HOURS PRN
Status: DISCONTINUED | OUTPATIENT
Start: 2023-10-05 | End: 2023-10-05 | Stop reason: HOSPADM

## 2023-10-05 RX ORDER — FORMOTEROL FUMARATE DIHYDRATE 20 UG/2ML
20 SOLUTION RESPIRATORY (INHALATION)
Status: DISCONTINUED | OUTPATIENT
Start: 2023-10-05 | End: 2023-10-05 | Stop reason: HOSPADM

## 2023-10-05 RX ORDER — ACETAMINOPHEN 160 MG/5ML
650 SOLUTION ORAL EVERY 4 HOURS PRN
Status: DISCONTINUED | OUTPATIENT
Start: 2023-10-05 | End: 2023-10-05 | Stop reason: HOSPADM

## 2023-10-05 RX ORDER — TRAZODONE HYDROCHLORIDE 50 MG/1
50 TABLET ORAL NIGHTLY PRN
Status: DISCONTINUED | OUTPATIENT
Start: 2023-10-05 | End: 2023-10-05 | Stop reason: HOSPADM

## 2023-10-05 RX ORDER — DULOXETIN HYDROCHLORIDE 30 MG/1
30 CAPSULE, DELAYED RELEASE ORAL DAILY
Status: DISCONTINUED | OUTPATIENT
Start: 2023-10-05 | End: 2023-10-05 | Stop reason: HOSPADM

## 2023-10-05 RX ORDER — POLYETHYLENE GLYCOL 3350 17 G/17G
17 POWDER, FOR SOLUTION ORAL DAILY PRN
Status: DISCONTINUED | OUTPATIENT
Start: 2023-10-05 | End: 2023-10-05 | Stop reason: HOSPADM

## 2023-10-05 RX ORDER — PANTOPRAZOLE SODIUM 20 MG/1
20 TABLET, DELAYED RELEASE ORAL 2 TIMES DAILY
Status: DISCONTINUED | OUTPATIENT
Start: 2023-10-05 | End: 2023-10-05 | Stop reason: HOSPADM

## 2023-10-05 RX ORDER — IPRATROPIUM BROMIDE AND ALBUTEROL SULFATE 2.5; .5 MG/3ML; MG/3ML
3 SOLUTION RESPIRATORY (INHALATION) EVERY 2 HOUR PRN
Status: DISCONTINUED | OUTPATIENT
Start: 2023-10-05 | End: 2023-10-05 | Stop reason: HOSPADM

## 2023-10-05 RX ADMIN — ATORVASTATIN CALCIUM 40 MG: 40 TABLET, FILM COATED ORAL at 10:10

## 2023-10-05 RX ADMIN — METOPROLOL SUCCINATE 50 MG: 50 TABLET, EXTENDED RELEASE ORAL at 10:10

## 2023-10-05 RX ADMIN — FORMOTEROL FUMARATE DIHYDRATE 20 MCG: 20 SOLUTION RESPIRATORY (INHALATION) at 08:00

## 2023-10-05 RX ADMIN — LEVOTHYROXINE SODIUM 125 MCG: 125 TABLET ORAL at 06:50

## 2023-10-05 RX ADMIN — GABAPENTIN 300 MG: 300 CAPSULE ORAL at 10:10

## 2023-10-05 RX ADMIN — DULOXETINE HYDROCHLORIDE 30 MG: 30 CAPSULE, DELAYED RELEASE ORAL at 10:10

## 2023-10-05 RX ADMIN — OXYBUTYNIN CHLORIDE 10 MG: 5 TABLET ORAL at 10:10

## 2023-10-05 RX ADMIN — ACETAMINOPHEN 650 MG: 325 TABLET ORAL at 01:47

## 2023-10-05 RX ADMIN — ENOXAPARIN SODIUM 40 MG: 40 INJECTION SUBCUTANEOUS at 06:50

## 2023-10-05 RX ADMIN — IPRATROPIUM BROMIDE AND ALBUTEROL SULFATE 3 ML: 2.5; .5 SOLUTION RESPIRATORY (INHALATION) at 08:00

## 2023-10-05 RX ADMIN — ALLOPURINOL 100 MG: 100 TABLET ORAL at 10:11

## 2023-10-05 RX ADMIN — METHYLPREDNISOLONE SODIUM SUCCINATE 40 MG: 40 INJECTION, POWDER, FOR SOLUTION INTRAMUSCULAR; INTRAVENOUS at 09:00

## 2023-10-05 RX ADMIN — PANTOPRAZOLE SODIUM 20 MG: 20 TABLET, DELAYED RELEASE ORAL at 10:11

## 2023-10-05 SDOH — ECONOMIC STABILITY: FOOD INSECURITY: WITHIN THE PAST 12 MONTHS, YOU WORRIED THAT YOUR FOOD WOULD RUN OUT BEFORE YOU GOT MONEY TO BUY MORE.: NEVER TRUE

## 2023-10-05 SDOH — SOCIAL STABILITY: SOCIAL INSECURITY: ARE THERE ANY APPARENT SIGNS OF INJURIES/BEHAVIORS THAT COULD BE RELATED TO ABUSE/NEGLECT?: NO

## 2023-10-05 SDOH — SOCIAL STABILITY: SOCIAL INSECURITY: DOES ANYONE TRY TO KEEP YOU FROM HAVING/CONTACTING OTHER FRIENDS OR DOING THINGS OUTSIDE YOUR HOME?: NO

## 2023-10-05 SDOH — ECONOMIC STABILITY: HOUSING INSECURITY
IN THE LAST 12 MONTHS, WAS THERE A TIME WHEN YOU DID NOT HAVE A STEADY PLACE TO SLEEP OR SLEPT IN A SHELTER (INCLUDING NOW)?: NO

## 2023-10-05 SDOH — SOCIAL STABILITY: SOCIAL INSECURITY
WITHIN THE LAST YEAR, HAVE YOU BEEN KICKED, HIT, SLAPPED, OR OTHERWISE PHYSICALLY HURT BY YOUR PARTNER OR EX-PARTNER?: NO

## 2023-10-05 SDOH — HEALTH STABILITY: MENTAL HEALTH: HOW OFTEN DO YOU HAVE A DRINK CONTAINING ALCOHOL?: NEVER

## 2023-10-05 SDOH — SOCIAL STABILITY: SOCIAL NETWORK
IN A TYPICAL WEEK, HOW MANY TIMES DO YOU TALK ON THE PHONE WITH FAMILY, FRIENDS, OR NEIGHBORS?: MORE THAN THREE TIMES A WEEK

## 2023-10-05 SDOH — SOCIAL STABILITY: SOCIAL INSECURITY: WITHIN THE LAST YEAR, HAVE YOU BEEN AFRAID OF YOUR PARTNER OR EX-PARTNER?: NO

## 2023-10-05 SDOH — ECONOMIC STABILITY: FOOD INSECURITY: WITHIN THE PAST 12 MONTHS, THE FOOD YOU BOUGHT JUST DIDN'T LAST AND YOU DIDN'T HAVE MONEY TO GET MORE.: NEVER TRUE

## 2023-10-05 SDOH — ECONOMIC STABILITY: INCOME INSECURITY: IN THE PAST 12 MONTHS, HAS THE ELECTRIC, GAS, OIL, OR WATER COMPANY THREATENED TO SHUT OFF SERVICE IN YOUR HOME?: NO

## 2023-10-05 SDOH — SOCIAL STABILITY: SOCIAL NETWORK: ARE YOU MARRIED, WIDOWED, DIVORCED, SEPARATED, NEVER MARRIED, OR LIVING WITH A PARTNER?: DIVORCED

## 2023-10-05 SDOH — HEALTH STABILITY: MENTAL HEALTH: HOW OFTEN DO YOU HAVE 6 OR MORE DRINKS ON ONE OCCASION?: NEVER

## 2023-10-05 SDOH — ECONOMIC STABILITY: TRANSPORTATION INSECURITY
IN THE PAST 12 MONTHS, HAS LACK OF TRANSPORTATION KEPT YOU FROM MEETINGS, WORK, OR FROM GETTING THINGS NEEDED FOR DAILY LIVING?: NO

## 2023-10-05 SDOH — HEALTH STABILITY: MENTAL HEALTH: HOW MANY STANDARD DRINKS CONTAINING ALCOHOL DO YOU HAVE ON A TYPICAL DAY?: PATIENT DOES NOT DRINK

## 2023-10-05 SDOH — ECONOMIC STABILITY: INCOME INSECURITY: HOW HARD IS IT FOR YOU TO PAY FOR THE VERY BASICS LIKE FOOD, HOUSING, MEDICAL CARE, AND HEATING?: NOT HARD AT ALL

## 2023-10-05 SDOH — HEALTH STABILITY: MENTAL HEALTH
STRESS IS WHEN SOMEONE FEELS TENSE, NERVOUS, ANXIOUS, OR CAN'T SLEEP AT NIGHT BECAUSE THEIR MIND IS TROUBLED. HOW STRESSED ARE YOU?: TO SOME EXTENT

## 2023-10-05 SDOH — HEALTH STABILITY: PHYSICAL HEALTH: ON AVERAGE, HOW MANY DAYS PER WEEK DO YOU ENGAGE IN MODERATE TO STRENUOUS EXERCISE (LIKE A BRISK WALK)?: 3 DAYS

## 2023-10-05 SDOH — SOCIAL STABILITY: SOCIAL INSECURITY
WITHIN THE LAST YEAR, HAVE TO BEEN RAPED OR FORCED TO HAVE ANY KIND OF SEXUAL ACTIVITY BY YOUR PARTNER OR EX-PARTNER?: NO

## 2023-10-05 SDOH — ECONOMIC STABILITY: INCOME INSECURITY: IN THE LAST 12 MONTHS, WAS THERE A TIME WHEN YOU WERE NOT ABLE TO PAY THE MORTGAGE OR RENT ON TIME?: NO

## 2023-10-05 SDOH — SOCIAL STABILITY: SOCIAL INSECURITY: WITHIN THE LAST YEAR, HAVE YOU BEEN HUMILIATED OR EMOTIONALLY ABUSED IN OTHER WAYS BY YOUR PARTNER OR EX-PARTNER?: NO

## 2023-10-05 SDOH — ECONOMIC STABILITY: HOUSING INSECURITY: IN THE LAST 12 MONTHS, HOW MANY PLACES HAVE YOU LIVED?: 1

## 2023-10-05 SDOH — SOCIAL STABILITY: SOCIAL INSECURITY: HAS ANYONE EVER THREATENED TO HURT YOUR FAMILY OR YOUR PETS?: NO

## 2023-10-05 SDOH — HEALTH STABILITY: PHYSICAL HEALTH: ON AVERAGE, HOW MANY MINUTES DO YOU ENGAGE IN EXERCISE AT THIS LEVEL?: 60 MIN

## 2023-10-05 SDOH — ECONOMIC STABILITY: TRANSPORTATION INSECURITY
IN THE PAST 12 MONTHS, HAS THE LACK OF TRANSPORTATION KEPT YOU FROM MEDICAL APPOINTMENTS OR FROM GETTING MEDICATIONS?: NO

## 2023-10-05 SDOH — SOCIAL STABILITY: SOCIAL NETWORK
DO YOU BELONG TO ANY CLUBS OR ORGANIZATIONS SUCH AS CHURCH GROUPS UNIONS, FRATERNAL OR ATHLETIC GROUPS, OR SCHOOL GROUPS?: YES

## 2023-10-05 SDOH — SOCIAL STABILITY: SOCIAL INSECURITY: DO YOU FEEL UNSAFE GOING BACK TO THE PLACE WHERE YOU ARE LIVING?: NO

## 2023-10-05 SDOH — SOCIAL STABILITY: SOCIAL NETWORK: HOW OFTEN DO YOU ATTENT MEETINGS OF THE CLUB OR ORGANIZATION YOU BELONG TO?: MORE THAN 4 TIMES PER YEAR

## 2023-10-05 SDOH — SOCIAL STABILITY: SOCIAL NETWORK: HOW OFTEN DO YOU ATTEND CHURCH OR RELIGIOUS SERVICES?: NEVER

## 2023-10-05 SDOH — SOCIAL STABILITY: SOCIAL INSECURITY: ARE YOU OR HAVE YOU BEEN THREATENED OR ABUSED PHYSICALLY, EMOTIONALLY, OR SEXUALLY BY ANYONE?: NO

## 2023-10-05 SDOH — SOCIAL STABILITY: SOCIAL NETWORK: HOW OFTEN DO YOU GET TOGETHER WITH FRIENDS OR RELATIVES?: MORE THAN THREE TIMES A WEEK

## 2023-10-05 SDOH — SOCIAL STABILITY: SOCIAL INSECURITY: HAVE YOU HAD THOUGHTS OF HARMING ANYONE ELSE?: NO

## 2023-10-05 SDOH — SOCIAL STABILITY: SOCIAL INSECURITY: ABUSE: ADULT

## 2023-10-05 SDOH — SOCIAL STABILITY: SOCIAL INSECURITY: DO YOU FEEL ANYONE HAS EXPLOITED OR TAKEN ADVANTAGE OF YOU FINANCIALLY OR OF YOUR PERSONAL PROPERTY?: NO

## 2023-10-05 ASSESSMENT — ENCOUNTER SYMPTOMS
ABDOMINAL PAIN: 0
SPEECH DIFFICULTY: 0
BLOOD IN STOOL: 0
FREQUENCY: 0
DIFFICULTY URINATING: 0
NAUSEA: 0
RHINORRHEA: 0
DYSURIA: 0
FLANK PAIN: 0
WEAKNESS: 0
POLYDIPSIA: 0
DIZZINESS: 0
WOUND: 0
BRUISES/BLEEDS EASILY: 0
COUGH: 1
SHORTNESS OF BREATH: 1
FEVER: 0
BACK PAIN: 0
DIAPHORESIS: 0
SINUS PRESSURE: 0
HEMATURIA: 0
PALPITATIONS: 0
WHEEZING: 1
VOMITING: 0
TROUBLE SWALLOWING: 0
DIARRHEA: 0
SLEEP DISTURBANCE: 0
CHILLS: 0
SORE THROAT: 0
APPETITE CHANGE: 0
HEADACHES: 0
UNEXPECTED WEIGHT CHANGE: 0
MYALGIAS: 0
FACIAL ASYMMETRY: 0
LIGHT-HEADEDNESS: 0
NERVOUS/ANXIOUS: 0
CONSTIPATION: 0
FATIGUE: 1
ACTIVITY CHANGE: 0
CHEST TIGHTNESS: 0
ABDOMINAL DISTENTION: 0

## 2023-10-05 ASSESSMENT — LIFESTYLE VARIABLES
SUBSTANCE_ABUSE_PAST_12_MONTHS: NO
AUDIT-C TOTAL SCORE: 2
SKIP TO QUESTIONS 9-10: 0
HOW OFTEN DO YOU HAVE A DRINK CONTAINING ALCOHOL: MONTHLY OR LESS
HOW MANY STANDARD DRINKS CONTAINING ALCOHOL DO YOU HAVE ON A TYPICAL DAY: 1 OR 2
HOW OFTEN DO YOU HAVE 6 OR MORE DRINKS ON ONE OCCASION: LESS THAN MONTHLY
SKIP TO QUESTIONS 9-10: 1
AUDIT-C TOTAL SCORE: 0
AUDIT-C TOTAL SCORE: 2
PRESCIPTION_ABUSE_PAST_12_MONTHS: YES

## 2023-10-05 ASSESSMENT — ACTIVITIES OF DAILY LIVING (ADL)
TOILETING: INDEPENDENT
FEEDING YOURSELF: INDEPENDENT
ASSISTIVE_DEVICE: EYEGLASSES
BATHING: INDEPENDENT
JUDGMENT_ADEQUATE_SAFELY_COMPLETE_DAILY_ACTIVITIES: YES
HEARING - LEFT EAR: FUNCTIONAL
WALKS IN HOME: INDEPENDENT
PATIENT'S MEMORY ADEQUATE TO SAFELY COMPLETE DAILY ACTIVITIES?: YES
ADEQUATE_TO_COMPLETE_ADL: YES
HEARING - RIGHT EAR: FUNCTIONAL
GROOMING: INDEPENDENT
DRESSING YOURSELF: INDEPENDENT

## 2023-10-05 ASSESSMENT — COGNITIVE AND FUNCTIONAL STATUS - GENERAL
MOBILITY SCORE: 23
CLIMB 3 TO 5 STEPS WITH RAILING: A LITTLE
DAILY ACTIVITIY SCORE: 24

## 2023-10-05 ASSESSMENT — PAIN SCALES - GENERAL
PAINLEVEL_OUTOF10: 0 - NO PAIN
PAINLEVEL_OUTOF10: 7

## 2023-10-05 ASSESSMENT — PATIENT HEALTH QUESTIONNAIRE - PHQ9
SUM OF ALL RESPONSES TO PHQ9 QUESTIONS 1 & 2: 0
2. FEELING DOWN, DEPRESSED OR HOPELESS: NOT AT ALL
1. LITTLE INTEREST OR PLEASURE IN DOING THINGS: NOT AT ALL

## 2023-10-05 ASSESSMENT — PAIN - FUNCTIONAL ASSESSMENT: PAIN_FUNCTIONAL_ASSESSMENT: 0-10

## 2023-10-05 NOTE — PROGRESS NOTES
Transitional Care Coordination Progress Note:  Plan per Medical/Surgical team: treatment of SOB with pulm consult  Status:observation  Payor source: [employer] medicare ADV  Discharge disposition: home alone, has PRN oxygen set up @ home  Potential Barriers: patient wants PETSCAN ?outpatient  ADOD: 10/5/2023  AURA Parekh RN, BSN Transitional Care Coordinator ED# [phone]      10/05/23 0901   Discharge Planning   Living Arrangements Alone   Support Systems Children;Friends/neighbors   Assistance Needed none   Type of Residence Private residence   Number of Stairs to Enter Residence 4   Number of Stairs Within Residence 12  (stays on first floor)   Do you have animals or pets at home? No   Home or Post Acute Services None   Patient expects to be discharged to: home alone   Does the patient need discharge transport arranged? No

## 2023-10-05 NOTE — PROGRESS NOTES
10/05/23 0901   [facility] Disability Status   Are you deaf or do you have serious difficulty hearing? N   Are you blind or do you have serious difficulty seeing, even when wearing glasses? N   Because of a physical, mental, or emotional condition, do you have serious difficulty concentrating, remembering, or making decisions? (5 years old or older) N   Do you have serious difficulty walking or climbing stairs? N   Do you have serious difficulty dressing or bathing? N   Because of a physical, mental, or emotional condition, do you have serious difficulty doing errands alone such as visiting the doctor? N

## 2023-10-05 NOTE — PROGRESS NOTES
Home alone, good community support   10/05/23 0900   Current Planned Discharge Disposition   Current Planned Discharge Disposition Home

## 2023-10-05 NOTE — PROGRESS NOTES
Sylwia Greenberg is a 73 y.o. female on day 0 of admission presenting with Lung mass.    Subjective   Patient up walking to bathroom not SOB awaiting pulmonology to see her       Objective     Physical Exam  General: Vital signs stable, Pt is alert, no acute distress  Eyes: Conjunctiva normal, PERRL, EOMs intact  HENMT: Normocephalic, atraumatic, external ears and nose normal, no scars or masses.  No mastoid tenderness. Trachea is midline. No meningeal signs, negative Kernig and Brudzinski, moves neck freely.  No sinus tenderness  Resp: Respiratory effort is normal, no retractions, no stridor. Lungs CTA, no wheezes or rhonchi  CV: Heart is regular rate and rhythm.   Skin: No evidence of trauma, skin is warm and dry. No rashes, lesions or ulcers.  Skel: full range of motion of upper and lower extremities.   Neuro: Normal gait, CN II-XII intact, no motor or sensory changes.  Psych: Alert and oriented ×3, judgment is appropriate, normal mood and affect     Last Recorded Vitals  Blood pressure 120/76, pulse 91, temperature 36.7 °C (98.1 °F), resp. rate 20, height 1.524 m (5'), weight 70.3 kg (155 lb), SpO2 94 %.  Intake/Output last 3 Shifts:  No intake/output data recorded.    Relevant Results  Results for orders placed or performed during the hospital encounter of 10/04/23 (from the past 24 hour(s))   CBC and Auto Differential   Result Value Ref Range    WBC 9.1 4.4 - 11.3 x10*3/uL    nRBC 0.0 0.0 - 0.0 /100 WBCs    RBC 4.12 4.00 - 5.20 x10*6/uL    Hemoglobin 12.4 12.0 - 16.0 g/dL    Hematocrit 39.2 36.0 - 46.0 %    MCV 95 80 - 100 fL    MCH 30.1 26.0 - 34.0 pg    MCHC 31.6 (L) 32.0 - 36.0 g/dL    RDW 13.4 11.5 - 14.5 %    Platelets 194 150 - 450 x10*3/uL    MPV 10.8 7.5 - 11.5 fL    Neutrophils % 75.9 40.0 - 80.0 %    Immature Granulocytes %, Automated 0.6 0.0 - 0.9 %    Lymphocytes % 13.9 13.0 - 44.0 %    Monocytes % 7.5 2.0 - 10.0 %    Eosinophils % 1.7 0.0 - 6.0 %    Basophils % 0.4 0.0 - 2.0 %    Neutrophils  Absolute 6.87 (H) 1.60 - 5.50 x10*3/uL    Immature Granulocytes Absolute, Automated 0.05 0.00 - 0.50 x10*3/uL    Lymphocytes Absolute 1.26 0.80 - 3.00 x10*3/uL    Monocytes Absolute 0.68 0.05 - 0.80 x10*3/uL    Eosinophils Absolute 0.15 0.00 - 0.40 x10*3/uL    Basophils Absolute 0.04 0.00 - 0.10 x10*3/uL   Comprehensive metabolic panel   Result Value Ref Range    Glucose 114 (H) 74 - 99 mg/dL    Sodium 140 136 - 145 mmol/L    Potassium 3.6 3.5 - 5.3 mmol/L    Chloride 101 98 - 107 mmol/L    Bicarbonate 27 21 - 32 mmol/L    Anion Gap 16 10 - 20 mmol/L    Urea Nitrogen 15 6 - 23 mg/dL    Creatinine 0.96 0.50 - 1.05 mg/dL    eGFR 63 >60 mL/min/1.73m*2    Calcium 9.7 8.6 - 10.3 mg/dL    Albumin 4.5 3.4 - 5.0 g/dL    Alkaline Phosphatase 92 33 - 136 U/L    Total Protein 7.7 6.4 - 8.2 g/dL    AST 33 9 - 39 U/L    Bilirubin, Total 0.5 0.0 - 1.2 mg/dL    ALT 23 7 - 45 U/L   B-Type Natriuretic Peptide   Result Value Ref Range    BNP 67 0 - 99 pg/mL   Blood Gas Venous   Result Value Ref Range    POCT pH, Venous 7.39 7.33 - 7.43 pH    POCT pCO2, Venous 54 (H) 41 - 51 mm Hg    POCT pO2, Venous 30 (L) 35 - 45 mm Hg    POCT SO2, Venous 42 (L) 45 - 75 %    POCT Oxy Hemoglobin, Venous 41.5 (L) 45.0 - 75.0 %    POCT Base Excess, Venous 6.2 (H) -2.0 - 3.0 mmol/L    POCT HCO3 Calculated, Venous 32.7 (H) 22.0 - 26.0 mmol/L    Patient Temperature 37.0 degrees Celsius    FiO2 21 %    Test Comment [facility] LAB2-S    Troponin I, High Sensitivity, Initial   Result Value Ref Range    Troponin I, High Sensitivity 8 0 - 13 ng/L   Troponin, High Sensitivity, 1 Hour   Result Value Ref Range    Troponin I, High Sensitivity 7 0 - 13 ng/L   CBC   Result Value Ref Range    WBC 6.4 4.4 - 11.3 x10*3/uL    nRBC 0.0 0.0 - 0.0 /100 WBCs    RBC 4.10 4.00 - 5.20 x10*6/uL    Hemoglobin 12.5 12.0 - 16.0 g/dL    Hematocrit 38.4 36.0 - 46.0 %    MCV 94 80 - 100 fL    MCH 30.5 26.0 - 34.0 pg    MCHC 32.6 32.0 - 36.0 g/dL    RDW 13.3 11.5 - 14.5 %    Platelets 201  150 - 450 x10*3/uL    MPV 10.3 7.5 - 11.5 fL   Basic metabolic panel   Result Value Ref Range    Glucose 134 (H) 74 - 99 mg/dL    Sodium 138 136 - 145 mmol/L    Potassium 3.8 3.5 - 5.3 mmol/L    Chloride 101 98 - 107 mmol/L    Bicarbonate 24 21 - 32 mmol/L    Anion Gap 17 10 - 20 mmol/L    Urea Nitrogen 17 6 - 23 mg/dL    Creatinine 0.83 0.50 - 1.05 mg/dL    eGFR 75 >60 mL/min/1.73m*2    Calcium 9.6 8.6 - 10.3 mg/dL       Imaging Results  XR chest 1 view    Result Date: 10/4/2023  Interpreted By:  Venus Payton, STUDY: XR CHEST 1 VIEW;  10/4/2023 6:58 pm   INDICATION: Signs/Symptoms:sob History of lung cancer.   COMPARISON: Chest x-ray 11/16/2022. CT angiogram chest 10/04/2023.   ACCESSION NUMBER(S): [id number]   ORDERING CLINICIAN: CINDY JAUREGUI   TECHNIQUE: Upright frontal view of the chest was obtained .   FINDINGS: Monitoring wires and radiopaque densities are overlying the patient.   The heart is enlarged. There is mild interstitial edema. Atherosclerotic calcifications are noted at the thoracic aorta.   Airspace opacity noted at the right lung base, probably corresponding to the spiculated masslike opacity described on earlier same date CT angiogram chest. No pleural effusion or pneumothorax.       1. Cardiomegaly. Mild interstitial edema. 2. Airspace opacity at the right lung base, probably corresponding to the spiculated masslike opacity described on earlier same date CT angiogram chest. PET/CT can be obtained to evaluate for tumor recurrence.       MACRO: None.   Signed by: Venus Payton 10/4/2023 7:11 PM Dictation workstation:   [id number]    CT angio chest for pulmonary embolism    Result Date: 10/4/2023  Interpreted By:  Seda Recinos, STUDY: CT ANGIO CHEST FOR PULMONARY EMBOLISM;  10/4/2023 6:44 pm   INDICATION: Signs/Symptoms:sob.   COMPARISON: CT chest dated 05/15/2023   ACCESSION NUMBER(S): [id number]   ORDERING CLINICIAN: CINDY JAUREGUI   TECHNIQUE: Helical data acquisition of  the chest was obtained after intravenous administration of 75 mLof Omnipaque 350, as per PE protocol. Images were reformatted in coronal and sagittal planes. Axial and coronal maximum intensity projection (MIP) images were created and reviewed. In addition, dual energy reconstructions were also performed including low energy virtual mono-energetic images and iodine density maps.   FINDINGS: POTENTIAL LIMITATIONS OF THE STUDY: None   HEART AND VESSELS: There are no discrete filling defects within main pulmonary artery and its branches to suggest acute pulmonary embolism. Main pulmonary artery and its branches are normal in caliber.   Mild ectasia of the ascending thoracic aorta is present, which measures up to 3.8 cm in diameter and demonstrates mild vascular calcifications. Although, the study is not tailored for evaluation of aorta, there is no definite evidence of acute aortic pathology. Moderate coronary artery calcifications are seen. Please note,the study is not optimized for evaluation of coronary arteries.   The cardiac chambers are not enlarged.There are no findings to suggest right heart strain. There is no pericardial effusion seen.   MEDIASTINUM AND BOOGIE, LOWER NECK AND AXILLA: The visualized thyroid gland is within normal limits. No evidence of thoracic lymphadenopathy by CT criteria. The esophagus is patulous and fluid-filled, similar in appearance to prior exam.   LUNGS AND AIRWAYS: The trachea and central airways are patent. No endobronchial lesion is seen.   Postsurgical changes of right upper lobectomy and wedge resection of the right lower lobe are again evident.   In the interim since prior exam in May of 2023, new spiculated masslike opacity is present in the right middle lobe (series 608, image 171), with mild interstitial thickening and micro nodularity present along the periphery. Several additional new nodular opacities are present in the left upper and left lower lobe along the pleura  (series 608, image 135). Cluster of nodules is also present in the right middle lobe more anteriorly (series 608, image 92).   UPPER ABDOMEN: The visualized subdiaphragmatic structures demonstrate no remarkable findings.   CHEST WALL AND OSSEOUS STRUCTURES: Chest wall is within normal limits. No acute osseous pathology.There are no suspicious osseous lesions.Multilevel degenerative changes are present in the thoracic spine without evidence of compression fractures or high-grade spinal canal stenosis.       1. In the interim since prior exam on 05/15/2023, new 2.4 x 2.2 cm spiculated, masslike opacity is present along the pleura in the inferior right lung with associated interstitial thickening and micro nodularity along the periphery. Given history of previous right lung carcinoma, finding is highly suspicious for local recurrence, until proven otherwise. 2. Several additional new nodules/increased are present in the inferior right lung and along the pleura in the left upper and left lower lobe, nonspecific findings that may represent focal infectious/inflammatory processes, such as bronchiolitis, although metastatic disease is not entirely excluded. 3. No pulmonary embolism is identified. 4. No new enlarged lymphadenopathy is identified. 5. Patulous appearance of fluid-filled esophagus, similar in appearance to prior exam, correlate with dysmotility.     MACRO: None   Signed by: Seda Recinos 10/4/2023 7:00 PM Dictation workstation:   [id number]    Electrocardiogram 12 Lead    Result Date: 9/29/2023  Normal sinus rhythm Inferior infarct , age undetermined Abnormal ECG When compared with ECG of 27-JUL-2020 05:14, Vent. rate has decreased BY  31 BPM Inferior infarct is now Present Nonspecific T wave abnormality no longer evident in Anterolateral leads Confirmed by Sander Carroll ([id number]) on 9/29/2023 4:33:26 PM      Medications:  allopurinol, 100 mg, oral, Daily  atorvastatin, 40 mg, oral, Daily  DULoxetine,  30 mg, oral, Daily  enoxaparin, 40 mg, subcutaneous, q24h  tiotropium, 2 puff, inhalation, Daily   And  formoterol, 20 mcg, nebulization, q12h  gabapentin, 300 mg, oral, BID  levothyroxine, 125 mcg, oral, Daily before breakfast  methylPREDNISolone sodium succinate (PF), 40 mg, intravenous, q8h  metoprolol succinate XL, 50 mg, oral, Daily  oxybutynin, 10 mg, oral, Daily  pantoprazole, 20 mg, oral, BID       PRN medications: acetaminophen **OR** acetaminophen **OR** acetaminophen, acetaminophen, albuterol, ipratropium-albuteroL, melatonin, ondansetron ODT **OR** ondansetron, polyethylene glycol, traZODone     Assessment/Plan   Acute Hypoxic respiratory failure  Acute COPD excerebration  Shortness of breath  Hx lung CA with RLL lung resection, last chemo 3 years ago,   -pulmonary consult  -CT chest: new 2.4 x 2.2cm masslike opacity, several new nodules LLL  -Solumedrol Q8  -c/w home inhalers  -Duonebs PRN  -on 2L NC (baseline 2L NC at hs)  -Procalcitonin level pending if normal then DC     Hx Paroxysmal Afib,  -c/w Metop     Hx hypothyroidism,  -c/w synthroid     Hx HLD  -c/w statin  DVT Prophylaxis:  -Lovenox  -Sequential teds    Labs/Testing reviewed    Interdisciplinary team rounding completed with hospitalist, nurse, TCC    NP discussed plan and lab/testing results with Dr. Iglesias    If procalcitonin level is negative then Will DC home to have out patient PET scan and follow up with Dr Field pulmonology for further evaluation of mass to make sure it is not recurrence of cancer.      * 45 minutes total spent on patient's care today; >50% time spent on counseling/coordination of care    Mara Hairston, PERNELL-CNP

## 2023-10-05 NOTE — DISCHARGE SUMMARY
Discharge Diagnosis  Lung mass    Issues Requiring Follow-Up  Lung mass    Discharge Meds     Your medication list        CONTINUE taking these medications        Instructions Last Dose Given Next Dose Due   albuterol sulfate 90 mcg/actuation aero powdr breath act w/sensor inhaler  Commonly known as: Proair Digihaler           alendronate 70 mg tablet  Commonly known as: Fosamax      TAKE 1 TABLET BY MOUTH WEEKLY  WITH 8 OZ OF PLAIN WATER 30  MINUTES BEFORE FIRST FOOD, DRINK OR MEDS. STAY UPRIGHT FOR 30  MINS       allopurinol 100 mg tablet  Commonly known as: Zyloprim      TAKE 1 TABLET BY MOUTH DAILY AS  DIRECTED       Anoro Ellipta 62.5-25 mcg/actuation blister with device  Generic drug: umeclidinium-vilanteroL           atorvastatin 40 mg tablet  Commonly known as: Lipitor      TAKE 1 TABLET BY MOUTH ONCE  DAILY       biotin 5 mg capsule           cholecalciferol 125 MCG (5000 UT) capsule  Commonly known as: Vitamin D-3           DULoxetine 30 mg DR capsule  Commonly known as: Cymbalta      Take 1 capsule (30 mg) by mouth once daily.       gabapentin 300 mg capsule  Commonly known as: Neurontin      Take 1 capsule (300 mg) by mouth 2 times a day.       levothyroxine 125 mcg tablet  Commonly known as: Synthroid, Levoxyl           metoprolol succinate XL 50 mg 24 hr tablet  Commonly known as: Toprol-XL           One Daily Multivitamin tablet  Generic drug: multivitamin           oxybutynin 5 mg tablet  Commonly known as: Ditropan      Take 2 tablets (10 mg) by mouth once daily.       pantoprazole 20 mg EC tablet  Commonly known as: ProtoNix           potassium chloride ER 8 mEq ER capsule  Commonly known as: Micro-K           traZODone 50 mg tablet  Commonly known as: Desyrel      Take 1 tablet (50 mg) by mouth as needed at bedtime for depression.                Test Results Pending At Discharge  Pending Labs       Order Current Status    Extra Tubes In process    Peres Top In process    Light Blue Top In process     Procalcitonin In process            Hospital Course   Acute Hypoxic respiratory failure  Acute COPD excerebration  Shortness of breath  Hx lung CA with RLL lung resection, last chemo 3 years ago,   -pulmonary consult  -CT chest: new 2.4 x 2.2cm masslike opacity, several new nodules LLL  -Solumedrol Q8  -c/w home inhalers  -Duonebs PRN  -on 2L NC (baseline 2L NC at hs)  -Procalcitonin level pending if normal then DC     Hx Paroxysmal Afib,  -c/w Metop     Hx hypothyroidism,  -c/w synthroid     Hx HLD  -c/w statin  DVT Prophylaxis:  -Lovenox  -Sequential teds     Labs/Testing reviewed     Interdisciplinary team rounding completed with hospitalist, nurse, TCC     NP discussed plan and lab/testing results with Dr. Iglesias     If procalcitonin level is negative then Will DC home to have out patient PET scan and follow up with Dr Field pulmonology for further evaluation of mass to make sure it is not recurrence of cancer.       * 45 minutes total spent on patient's care today; >50% time spent on counseling/coordination of care    Pertinent Physical Exam At Time of Discharge  Physical Exam  General: Vital signs stable, Pt is alert, no acute distress  Eyes: Conjunctiva normal, PERRL, EOMs intact  HENMT: Normocephalic, atraumatic, external ears and nose normal, no scars or masses.  No mastoid tenderness. Trachea is midline. No meningeal signs, negative Kernig and Brudzinski, moves neck freely.  No sinus tenderness  Resp: Respiratory effort is normal, no retractions, no stridor. Lungs CTA, no wheezes or rhonchi.  Up walking around room and to bathroom no difficulty  CV: Heart is regular rate and rhythm.   Skin: No evidence of trauma, skin is warm and dry. No rashes, lesions or ulcers.  Skel: full range of motion of upper and lower extremities.   Neuro: Normal gait, CN II-XII intact, no motor or sensory changes.  Psych: Alert and oriented ×3, judgment is appropriate, normal mood and affect     Outpatient  Follow-Up  Future Appointments   Date Time Provider Department Center   10/12/2023  9:20 AM MIN ECHO (STRESS) [id number] [facility]   11/24/2023  9:30 AM [facility] SCC MIN  [id number] [facility]   11/24/2023 10:00 AM [facility] [id number] CT 1 [id number] [facility]   11/29/2023 10:20 AM Lilli Iglesias MD [id number] [facility]   3/21/2024  8:40 AM Sander Carroll MD [id number] [facility]         Mara Hairston, APRN-CNP

## 2023-10-05 NOTE — CONSULTS
"Consult Note    Patient is a 73 y.o. female with a past medical history of squamous cell carcinoma of the lung, status post chemotherapy and right lower lobe resection, moderate COPD, hypothyroidism, atrial flutter, Pseudomonas pneumonia, GERD, coronary disease, headache, fibromyalgia, gout and sinusitis, who was admitted to [facility] on October 4, 2023 with shortness of breath.  In the emergency room the patient was noted to have a temperature of 36.9, pulse 107, respiratory rate 18, blood pressure 120/76 and oxygen saturation of 93% on room air.  She was found to have a normal CBC, CMP, troponin and BNP, and admission chest x-ray showing cardiomegaly and a right lower lobe opacity (new from a chest x-ray from November 16, 2022).  A CT angiogram of the chest showed no pulmonary embolism or adenopathy, but did show a new 2.4 x 2.2 cm pulmonary mass-like opacity in the right upper/middle lobe, compared to a prior CT scan of the chest from May 15, 2023.  The patient was treated with DuoNeb, Lovenox, Synthroid, Spiriva, Perforomist, prednisone and melatonin.  Presently, the patient feels \"okay\".  She denies fevers, chills, sweats, shortness of breath at rest, leg pain, wheezing or weight loss, but does admit to dyspnea on exertion, chest \"heaviness\", a nonproductive cough, wheezing and postnasal drip.  She states that she uses oxygen 2 L/min at night only, that she has been short of breath for about 1-1/2 years which was worse over the past 1 week, uses inhalers at home and has no history of obstructive sleep apnea.    Past medical history: As above.    Allergies: Per EMR.    Medications: Per EMR.    Social history:  Cigarettes-smoked 1 to 2 packs a day for 40 years but quit 3 years ago.  Alcohol-occasional.  Pets, hobbies and travel history-noncontributory.  Occupation-retired CPA.    Family history:  Pulmonary disease-Brother (COPD).  Coronary disease-father, uncles and " aunt.  Hypertension-brother.  Diabetes-none.  Cancer-none.    Review of systems:  Swallowing problems-positive.  GI-none.  -none.  Musculoskeletal-DJD pain of the knees.  Skin-no rashes.    PE:  Visit Vitals  /76   Pulse 91   Temp 36.7 °C (98.1 °F)   Resp 20      Physical Exam:  Gen:  Awake, alert and in NAD.  HEENT:  Normal conjunctivae and pharynx; no sinus tenderness.  Neck:  No thyroid enlargement or adenopathy.  Pulm: Bilateral rales, left greater than right, without wheezes, rhonchi, dullness or E to A changes.  Heart:  RRR without S3, S4 or murmurs.  Abd:  Soft, non-tender and with positive bowel sounds.  Extrem:  No clubbing or edema.  Skin:  No rashes.    Scheduled medications  allopurinol, 100 mg, oral, Daily  atorvastatin, 40 mg, oral, Daily  DULoxetine, 30 mg, oral, Daily  enoxaparin, 40 mg, subcutaneous, q24h  tiotropium, 2 puff, inhalation, Daily   And  formoterol, 20 mcg, nebulization, q12h  gabapentin, 300 mg, oral, BID  levothyroxine, 125 mcg, oral, Daily before breakfast  methylPREDNISolone sodium succinate (PF), 40 mg, intravenous, q8h  metoprolol succinate XL, 50 mg, oral, Daily  oxybutynin, 10 mg, oral, Daily  pantoprazole, 20 mg, oral, BID      Continuous medications     PRN medications  PRN medications: acetaminophen **OR** acetaminophen **OR** acetaminophen, acetaminophen, albuterol, ipratropium-albuteroL, melatonin, ondansetron ODT **OR** ondansetron, polyethylene glycol, traZODone     Results for orders placed or performed during the hospital encounter of 10/04/23 (from the past 96 hour(s))   CBC and Auto Differential   Result Value Ref Range    WBC 9.1 4.4 - 11.3 x10*3/uL    nRBC 0.0 0.0 - 0.0 /100 WBCs    RBC 4.12 4.00 - 5.20 x10*6/uL    Hemoglobin 12.4 12.0 - 16.0 g/dL    Hematocrit 39.2 36.0 - 46.0 %    MCV 95 80 - 100 fL    MCH 30.1 26.0 - 34.0 pg    MCHC 31.6 (L) 32.0 - 36.0 g/dL    RDW 13.4 11.5 - 14.5 %    Platelets 194 150 - 450 x10*3/uL    MPV 10.8 7.5 - 11.5 fL     Neutrophils % 75.9 40.0 - 80.0 %    Immature Granulocytes %, Automated 0.6 0.0 - 0.9 %    Lymphocytes % 13.9 13.0 - 44.0 %    Monocytes % 7.5 2.0 - 10.0 %    Eosinophils % 1.7 0.0 - 6.0 %    Basophils % 0.4 0.0 - 2.0 %    Neutrophils Absolute 6.87 (H) 1.60 - 5.50 x10*3/uL    Immature Granulocytes Absolute, Automated 0.05 0.00 - 0.50 x10*3/uL    Lymphocytes Absolute 1.26 0.80 - 3.00 x10*3/uL    Monocytes Absolute 0.68 0.05 - 0.80 x10*3/uL    Eosinophils Absolute 0.15 0.00 - 0.40 x10*3/uL    Basophils Absolute 0.04 0.00 - 0.10 x10*3/uL   Comprehensive metabolic panel   Result Value Ref Range    Glucose 114 (H) 74 - 99 mg/dL    Sodium 140 136 - 145 mmol/L    Potassium 3.6 3.5 - 5.3 mmol/L    Chloride 101 98 - 107 mmol/L    Bicarbonate 27 21 - 32 mmol/L    Anion Gap 16 10 - 20 mmol/L    Urea Nitrogen 15 6 - 23 mg/dL    Creatinine 0.96 0.50 - 1.05 mg/dL    eGFR 63 >60 mL/min/1.73m*2    Calcium 9.7 8.6 - 10.3 mg/dL    Albumin 4.5 3.4 - 5.0 g/dL    Alkaline Phosphatase 92 33 - 136 U/L    Total Protein 7.7 6.4 - 8.2 g/dL    AST 33 9 - 39 U/L    Bilirubin, Total 0.5 0.0 - 1.2 mg/dL    ALT 23 7 - 45 U/L   B-Type Natriuretic Peptide   Result Value Ref Range    BNP 67 0 - 99 pg/mL   Blood Gas Venous   Result Value Ref Range    POCT pH, Venous 7.39 7.33 - 7.43 pH    POCT pCO2, Venous 54 (H) 41 - 51 mm Hg    POCT pO2, Venous 30 (L) 35 - 45 mm Hg    POCT SO2, Venous 42 (L) 45 - 75 %    POCT Oxy Hemoglobin, Venous 41.5 (L) 45.0 - 75.0 %    POCT Base Excess, Venous 6.2 (H) -2.0 - 3.0 mmol/L    POCT HCO3 Calculated, Venous 32.7 (H) 22.0 - 26.0 mmol/L    Patient Temperature 37.0 degrees Celsius    FiO2 21 %    Test Comment [facility]    Troponin I, High Sensitivity, Initial   Result Value Ref Range    Troponin I, High Sensitivity 8 0 - 13 ng/L   Troponin, High Sensitivity, 1 Hour   Result Value Ref Range    Troponin I, High Sensitivity 7 0 - 13 ng/L   CBC   Result Value Ref Range    WBC 6.4 4.4 - 11.3 x10*3/uL    nRBC 0.0 0.0 - 0.0  /100 WBCs    RBC 4.10 4.00 - 5.20 x10*6/uL    Hemoglobin 12.5 12.0 - 16.0 g/dL    Hematocrit 38.4 36.0 - 46.0 %    MCV 94 80 - 100 fL    MCH 30.5 26.0 - 34.0 pg    MCHC 32.6 32.0 - 36.0 g/dL    RDW 13.3 11.5 - 14.5 %    Platelets 201 150 - 450 x10*3/uL    MPV 10.3 7.5 - 11.5 fL   Basic metabolic panel   Result Value Ref Range    Glucose 134 (H) 74 - 99 mg/dL    Sodium 138 136 - 145 mmol/L    Potassium 3.8 3.5 - 5.3 mmol/L    Chloride 101 98 - 107 mmol/L    Bicarbonate 24 21 - 32 mmol/L    Anion Gap 17 10 - 20 mmol/L    Urea Nitrogen 17 6 - 23 mg/dL    Creatinine 0.83 0.50 - 1.05 mg/dL    eGFR 75 >60 mL/min/1.73m*2    Calcium 9.6 8.6 - 10.3 mg/dL        XR chest 1 view    Result Date: 10/4/2023  Interpreted By:  Venus Payton, STUDY: XR CHEST 1 VIEW;  10/4/2023 6:58 pm   INDICATION: Signs/Symptoms:sob History of lung cancer.   COMPARISON: Chest x-ray 11/16/2022. CT angiogram chest 10/04/2023.   ACCESSION NUMBER(S): [id number]   ORDERING CLINICIAN: CINDY JAUREGUI   TECHNIQUE: Upright frontal view of the chest was obtained .   FINDINGS: Monitoring wires and radiopaque densities are overlying the patient.   The heart is enlarged. There is mild interstitial edema. Atherosclerotic calcifications are noted at the thoracic aorta.   Airspace opacity noted at the right lung base, probably corresponding to the spiculated masslike opacity described on earlier same date CT angiogram chest. No pleural effusion or pneumothorax.       1. Cardiomegaly. Mild interstitial edema. 2. Airspace opacity at the right lung base, probably corresponding to the spiculated masslike opacity described on earlier same date CT angiogram chest. PET/CT can be obtained to evaluate for tumor recurrence.       MACRO: None.   Signed by: Venus Payton 10/4/2023 7:11 PM Dictation workstation:   [id number]    CT angio chest for pulmonary embolism    Result Date: 10/4/2023  Interpreted By:  Seda Recinos, STUDY: CT ANGIO CHEST FOR PULMONARY  EMBOLISM;  10/4/2023 6:44 pm   INDICATION: Signs/Symptoms:sob.   COMPARISON: CT chest dated 05/15/2023   ACCESSION NUMBER(S): [id number]   ORDERING CLINICIAN: CINDY JAUREGUI   TECHNIQUE: Helical data acquisition of the chest was obtained after intravenous administration of 75 mLof Omnipaque 350, as per PE protocol. Images were reformatted in coronal and sagittal planes. Axial and coronal maximum intensity projection (MIP) images were created and reviewed. In addition, dual energy reconstructions were also performed including low energy virtual mono-energetic images and iodine density maps.   FINDINGS: POTENTIAL LIMITATIONS OF THE STUDY: None   HEART AND VESSELS: There are no discrete filling defects within main pulmonary artery and its branches to suggest acute pulmonary embolism. Main pulmonary artery and its branches are normal in caliber.   Mild ectasia of the ascending thoracic aorta is present, which measures up to 3.8 cm in diameter and demonstrates mild vascular calcifications. Although, the study is not tailored for evaluation of aorta, there is no definite evidence of acute aortic pathology. Moderate coronary artery calcifications are seen. Please note,the study is not optimized for evaluation of coronary arteries.   The cardiac chambers are not enlarged.There are no findings to suggest right heart strain. There is no pericardial effusion seen.   MEDIASTINUM AND BOOGIE, LOWER NECK AND AXILLA: The visualized thyroid gland is within normal limits. No evidence of thoracic lymphadenopathy by CT criteria. The esophagus is patulous and fluid-filled, similar in appearance to prior exam.   LUNGS AND AIRWAYS: The trachea and central airways are patent. No endobronchial lesion is seen.   Postsurgical changes of right upper lobectomy and wedge resection of the right lower lobe are again evident.   In the interim since prior exam in May of 2023, new spiculated masslike opacity is present in the right middle lobe  (series 608, image 171), with mild interstitial thickening and micro nodularity present along the periphery. Several additional new nodular opacities are present in the left upper and left lower lobe along the pleura (series 608, image 135). Cluster of nodules is also present in the right middle lobe more anteriorly (series 608, image 92).   UPPER ABDOMEN: The visualized subdiaphragmatic structures demonstrate no remarkable findings.   CHEST WALL AND OSSEOUS STRUCTURES: Chest wall is within normal limits. No acute osseous pathology.There are no suspicious osseous lesions.Multilevel degenerative changes are present in the thoracic spine without evidence of compression fractures or high-grade spinal canal stenosis.       1. In the interim since prior exam on 05/15/2023, new 2.4 x 2.2 cm spiculated, masslike opacity is present along the pleura in the inferior right lung with associated interstitial thickening and micro nodularity along the periphery. Given history of previous right lung carcinoma, finding is highly suspicious for local recurrence, until proven otherwise. 2. Several additional new nodules/increased are present in the inferior right lung and along the pleura in the left upper and left lower lobe, nonspecific findings that may represent focal infectious/inflammatory processes, such as bronchiolitis, although metastatic disease is not entirely excluded. 3. No pulmonary embolism is identified. 4. No new enlarged lymphadenopathy is identified. 5. Patulous appearance of fluid-filled esophagus, similar in appearance to prior exam, correlate with dysmotility.     MACRO: None   Signed by: Seda Recinos 10/4/2023 7:00 PM Dictation workstation:   [id number]    Echocardiogram (March 14, 2022)--LVEF of 75% with mild pulmonary hypertension.    Electrocardiogram 12 Lead    Result Date: 9/29/2023  Normal sinus rhythm Inferior infarct , age undetermined Abnormal ECG When compared with ECG of 27-JUL-2020 05:14,  Vent. rate has decreased BY  31 BPM Inferior infarct is now Present Nonspecific T wave abnormality no longer evident in Anterolateral leads Confirmed by Sander Carroll ([id number]) on 9/29/2023 4:33:26 PM       Assessment:  73-year-old woman with a history of squamous cell carcinoma of the lung, status post right lower lobe resection and chemotherapy, moderate COPD, hypothyroidism, atrial flutter, Pseudomonas pneumonia, GERD, coronary artery disease, headache, fibromyalgia, gout, mild pulmonary hypertension and sinusitis, admitted with shortness of breath, and found to have a normal white blood count and BNP and an incidental finding on CT angiogram of the chest of a new right upper lobe/right middle lobe mass-like opacity (2.4 x 2.2 cm) with bilateral pulmonary nodules/infiltrates.  The differential diagnosis includes:  Focal pneumonia; metastatic lung cancer; new primary lung malignancy; or pulmonary infarction (unlikely).  The patient has a history of COPD, but there is no bronchospasm presently.    Recommend:  1.  Continue DuoNeb, formoterol, Solu-Medrol, Lovenox, Synthroid, but discontinue Spiriva.  2.  Check Procalcitonin-pending.  3.  Incentive spirometry and Acapella treatment.  4.  Keep oxygen saturation approximately 92 to 95%.  5.  Would defer CT-guided FNA of the patient's right upper lobe/right middle lobe mass-like opacity at this time.  6.  PET scan or follow-up CT scan of the chest in approximately 2 to 4 weeks (or possible PET scan today).  7.  The patient should follow-up with her outpatient pulmonologist, Dr. Stas Field, after discharge.     Hua Rahman MD

## 2023-10-05 NOTE — PROGRESS NOTES
10/05/23 0903   Physical Activity   On average, how many days per week do you engage in moderate to strenuous exercise (like a brisk walk)? 3 days  ([facility] gym- biking, track & machines)   On average, how many minutes do you engage in exercise at this level? 60 min   Financial Resource Strain   How hard is it for you to pay for the very basics like food, housing, medical care, and heating? Not hard   Housing Stability   In the last 12 months, was there a time when you were not able to pay the mortgage or rent on time? N   In the last 12 months, how many places have you lived? 1   In the last 12 months, was there a time when you did not have a steady place to sleep or slept in a shelter (including now)? N   Transportation Needs   In the past 12 months, has lack of transportation kept you from medical appointments or from getting medications? no   In the past 12 months, has lack of transportation kept you from meetings, work, or from getting things needed for daily living? No   Food Insecurity   Within the past 12 months, you worried that your food would run out before you got the money to buy more. Never true   Within the past 12 months, the food you bought just didn't last and you didn't have money to get more. Never true   Stress   Do you feel stress - tense, restless, nervous, or anxious, or unable to sleep at night because your mind is troubled all the time - these days? To some exte   Social Connections   In a typical week, how many times do you talk on the phone with family, friends, or neighbors? More than 3   How often do you get together with friends or relatives? More than 3   How often do you attend Sabianist or Uatsdin services? Never   Do you belong to any clubs or organizations such as Sabianist groups, unions, fraternal or athletic groups, or school groups? Yes  ([facility], Bengali bocce ball leagues- plays & judges)   How often do you attend meetings of the clubs or organizations you  belong to? More than 4   Are you , , , , never , or living with a partner?    Intimate Partner Violence   Within the last year, have you been afraid of your partner or ex-partner? No   Within the last year, have you been humiliated or emotionally abused in other ways by your partner or ex-partner? No   Within the last year, have you been kicked, hit, slapped, or otherwise physically hurt by your partner or ex-partner? No   Within the last year, have you been raped or forced to have any kind of sexual activity by your partner or ex-partner? No   Alcohol Use   Q1: How often do you have a drink containing alcohol? Never   Q2: How many drinks containing alcohol do you have on a typical day when you are drinking? None   Q3: How often do you have six or more drinks on one occasion? Never   Utilities   In the past 12 months has the electric, gas, oil, or water company threatened to shut off services in your home? No

## 2023-10-05 NOTE — H&P
History Of Present Illness  Sylwia Greenberg is a 73 y.o. female presenting with shortness of breath. The patient states that she has been experiencing shortness of breath off and on for the last several weeks that significantly worsened over the last 3-4 days. She normally sees Dr. Montaño with pulmonary. The patient was checking her pulse ox at home and dropping into the low 80s with HR 120s. She called Dr. Montaño to discuss her symptoms and her referred her to the ED for work up.      Past Medical History  Lung cancer, COPD, Paroxysmal Afib, fibromyalgia, hypothyroidism, HLD    Surgical History  She has a past surgical history that includes Back surgery (2013);  section, classic (2013); Other surgical history (2020); Other surgical history (2020); Other surgical history (2020); MR angio head wo IV contrast (3/14/2018); and CT angio coronary art with heartflow if score >30% (2022). RLL lung resection     Social History  She reports that she has never smoked. She has never used smokeless tobacco. She reports that she does not drink alcohol and does not use drugs.    Family History  No family history on file.     Allergies  Patient has no known allergies.    Review of Systems   Constitutional:  Positive for fatigue. Negative for activity change, appetite change, chills, diaphoresis, fever and unexpected weight change.   HENT:  Negative for congestion, postnasal drip, rhinorrhea, sinus pressure, sore throat and trouble swallowing.    Eyes:  Negative for visual disturbance.   Respiratory:  Positive for cough, shortness of breath and wheezing. Negative for chest tightness.    Cardiovascular:  Negative for chest pain, palpitations and leg swelling.   Gastrointestinal:  Negative for abdominal distention, abdominal pain, blood in stool, constipation, diarrhea, nausea and vomiting.   Endocrine: Negative for polydipsia and polyuria.   Genitourinary:  Negative for difficulty  urinating, dysuria, flank pain, frequency, hematuria and urgency.   Musculoskeletal:  Negative for back pain, gait problem and myalgias.   Skin:  Negative for pallor, rash and wound.   Neurological:  Negative for dizziness, syncope, facial asymmetry, speech difficulty, weakness, light-headedness and headaches.   Hematological:  Does not bruise/bleed easily.   Psychiatric/Behavioral:  Negative for sleep disturbance. The patient is not nervous/anxious.         Physical Exam  Constitutional:       General: She is not in acute distress.     Appearance: Normal appearance.   HENT:      Head: Normocephalic and atraumatic.   Eyes:      Pupils: Pupils are equal, round, and reactive to light.   Cardiovascular:      Rate and Rhythm: Normal rate and regular rhythm.   Pulmonary:      Effort: Respiratory distress present.      Breath sounds: No wheezing, rhonchi or rales.      Comments: Diminished throughout, mild respiratory distress  Abdominal:      General: Bowel sounds are normal.      Palpations: Abdomen is soft.   Musculoskeletal:         General: Normal range of motion.   Skin:     General: Skin is warm and dry.      Capillary Refill: Capillary refill takes less than 2 seconds.   Neurological:      Mental Status: She is alert and oriented to person, place, and time.   Psychiatric:         Mood and Affect: Mood normal.         Behavior: Behavior normal.          Last Recorded Vitals  Blood pressure 118/81, pulse 86, temperature 36.3 °C (97.3 °F), resp. rate 20, height 1.524 m (5'), weight 70.3 kg (155 lb), SpO2 96 %.    Relevant Results  Scheduled medications  allopurinol, 100 mg, oral, Daily  atorvastatin, 40 mg, oral, Daily  DULoxetine, 30 mg, oral, Daily  enoxaparin, 40 mg, subcutaneous, q24h  tiotropium, 2 puff, inhalation, Daily   And  formoterol, 20 mcg, nebulization, q12h  gabapentin, 300 mg, oral, BID  levothyroxine, 125 mcg, oral, Daily before breakfast  methylPREDNISolone sodium succinate (PF), 40 mg, intravenous,  q8h  metoprolol succinate XL, 50 mg, oral, Daily  oxybutynin, 10 mg, oral, Daily  pantoprazole, 20 mg, oral, BID      Continuous medications     PRN medications  PRN medications: acetaminophen **OR** acetaminophen **OR** acetaminophen, acetaminophen, albuterol, melatonin, ondansetron ODT **OR** ondansetron, polyethylene glycol, traZODone     Results for orders placed or performed during the hospital encounter of 10/04/23 (from the past 24 hour(s))   CBC and Auto Differential   Result Value Ref Range    WBC 9.1 4.4 - 11.3 x10*3/uL    nRBC 0.0 0.0 - 0.0 /100 WBCs    RBC 4.12 4.00 - 5.20 x10*6/uL    Hemoglobin 12.4 12.0 - 16.0 g/dL    Hematocrit 39.2 36.0 - 46.0 %    MCV 95 80 - 100 fL    MCH 30.1 26.0 - 34.0 pg    MCHC 31.6 (L) 32.0 - 36.0 g/dL    RDW 13.4 11.5 - 14.5 %    Platelets 194 150 - 450 x10*3/uL    MPV 10.8 7.5 - 11.5 fL    Neutrophils % 75.9 40.0 - 80.0 %    Immature Granulocytes %, Automated 0.6 0.0 - 0.9 %    Lymphocytes % 13.9 13.0 - 44.0 %    Monocytes % 7.5 2.0 - 10.0 %    Eosinophils % 1.7 0.0 - 6.0 %    Basophils % 0.4 0.0 - 2.0 %    Neutrophils Absolute 6.87 (H) 1.60 - 5.50 x10*3/uL    Immature Granulocytes Absolute, Automated 0.05 0.00 - 0.50 x10*3/uL    Lymphocytes Absolute 1.26 0.80 - 3.00 x10*3/uL    Monocytes Absolute 0.68 0.05 - 0.80 x10*3/uL    Eosinophils Absolute 0.15 0.00 - 0.40 x10*3/uL    Basophils Absolute 0.04 0.00 - 0.10 x10*3/uL   Comprehensive metabolic panel   Result Value Ref Range    Glucose 114 (H) 74 - 99 mg/dL    Sodium 140 136 - 145 mmol/L    Potassium 3.6 3.5 - 5.3 mmol/L    Chloride 101 98 - 107 mmol/L    Bicarbonate 27 21 - 32 mmol/L    Anion Gap 16 10 - 20 mmol/L    Urea Nitrogen 15 6 - 23 mg/dL    Creatinine 0.96 0.50 - 1.05 mg/dL    eGFR 63 >60 mL/min/1.73m*2    Calcium 9.7 8.6 - 10.3 mg/dL    Albumin 4.5 3.4 - 5.0 g/dL    Alkaline Phosphatase 92 33 - 136 U/L    Total Protein 7.7 6.4 - 8.2 g/dL    AST 33 9 - 39 U/L    Bilirubin, Total 0.5 0.0 - 1.2 mg/dL    ALT 23 7 -  45 U/L   B-Type Natriuretic Peptide   Result Value Ref Range    BNP 67 0 - 99 pg/mL   Blood Gas Venous   Result Value Ref Range    POCT pH, Venous 7.39 7.33 - 7.43 pH    POCT pCO2, Venous 54 (H) 41 - 51 mm Hg    POCT pO2, Venous 30 (L) 35 - 45 mm Hg    POCT SO2, Venous 42 (L) 45 - 75 %    POCT Oxy Hemoglobin, Venous 41.5 (L) 45.0 - 75.0 %    POCT Base Excess, Venous 6.2 (H) -2.0 - 3.0 mmol/L    POCT HCO3 Calculated, Venous 32.7 (H) 22.0 - 26.0 mmol/L    Patient Temperature 37.0 degrees Celsius    FiO2 21 %    Test Comment [facility]-S    Troponin I, High Sensitivity, Initial   Result Value Ref Range    Troponin I, High Sensitivity 8 0 - 13 ng/L   Troponin, High Sensitivity, 1 Hour   Result Value Ref Range    Troponin I, High Sensitivity 7 0 - 13 ng/L   CBC   Result Value Ref Range    WBC 6.4 4.4 - 11.3 x10*3/uL    nRBC 0.0 0.0 - 0.0 /100 WBCs    RBC 4.10 4.00 - 5.20 x10*6/uL    Hemoglobin 12.5 12.0 - 16.0 g/dL    Hematocrit 38.4 36.0 - 46.0 %    MCV 94 80 - 100 fL    MCH 30.5 26.0 - 34.0 pg    MCHC 32.6 32.0 - 36.0 g/dL    RDW 13.3 11.5 - 14.5 %    Platelets 201 150 - 450 x10*3/uL    MPV 10.3 7.5 - 11.5 fL        XR chest 1 view    Result Date: 10/4/2023  Interpreted By:  Venus Payton, STUDY: XR CHEST 1 VIEW;  10/4/2023 6:58 pm   INDICATION: Signs/Symptoms:sob History of lung cancer.   COMPARISON: Chest x-ray 11/16/2022. CT angiogram chest 10/04/2023.   ACCESSION NUMBER(S): [id number]   ORDERING CLINICIAN: CINDY JAUREGUI   TECHNIQUE: Upright frontal view of the chest was obtained .   FINDINGS: Monitoring wires and radiopaque densities are overlying the patient.   The heart is enlarged. There is mild interstitial edema. Atherosclerotic calcifications are noted at the thoracic aorta.   Airspace opacity noted at the right lung base, probably corresponding to the spiculated masslike opacity described on earlier same date CT angiogram chest. No pleural effusion or pneumothorax.       1. Cardiomegaly. Mild  interstitial edema. 2. Airspace opacity at the right lung base, probably corresponding to the spiculated masslike opacity described on earlier same date CT angiogram chest. PET/CT can be obtained to evaluate for tumor recurrence.       MACRO: None.   Signed by: Venus Payton 10/4/2023 7:11 PM Dictation workstation:   [id number]    CT angio chest for pulmonary embolism    Result Date: 10/4/2023  Interpreted By:  Seda Recinos, STUDY: CT ANGIO CHEST FOR PULMONARY EMBOLISM;  10/4/2023 6:44 pm   INDICATION: Signs/Symptoms:sob.   COMPARISON: CT chest dated 05/15/2023   ACCESSION NUMBER(S): [id number]   ORDERING CLINICIAN: CINDY JAUREGUI   TECHNIQUE: Helical data acquisition of the chest was obtained after intravenous administration of 75 mLof Omnipaque 350, as per PE protocol. Images were reformatted in coronal and sagittal planes. Axial and coronal maximum intensity projection (MIP) images were created and reviewed. In addition, dual energy reconstructions were also performed including low energy virtual mono-energetic images and iodine density maps.   FINDINGS: POTENTIAL LIMITATIONS OF THE STUDY: None   HEART AND VESSELS: There are no discrete filling defects within main pulmonary artery and its branches to suggest acute pulmonary embolism. Main pulmonary artery and its branches are normal in caliber.   Mild ectasia of the ascending thoracic aorta is present, which measures up to 3.8 cm in diameter and demonstrates mild vascular calcifications. Although, the study is not tailored for evaluation of aorta, there is no definite evidence of acute aortic pathology. Moderate coronary artery calcifications are seen. Please note,the study is not optimized for evaluation of coronary arteries.   The cardiac chambers are not enlarged.There are no findings to suggest right heart strain. There is no pericardial effusion seen.   MEDIASTINUM AND BOOGIE, LOWER NECK AND AXILLA: The visualized thyroid gland is within normal  limits. No evidence of thoracic lymphadenopathy by CT criteria. The esophagus is patulous and fluid-filled, similar in appearance to prior exam.   LUNGS AND AIRWAYS: The trachea and central airways are patent. No endobronchial lesion is seen.   Postsurgical changes of right upper lobectomy and wedge resection of the right lower lobe are again evident.   In the interim since prior exam in May of 2023, new spiculated masslike opacity is present in the right middle lobe (series 608, image 171), with mild interstitial thickening and micro nodularity present along the periphery. Several additional new nodular opacities are present in the left upper and left lower lobe along the pleura (series 608, image 135). Cluster of nodules is also present in the right middle lobe more anteriorly (series 608, image 92).   UPPER ABDOMEN: The visualized subdiaphragmatic structures demonstrate no remarkable findings.   CHEST WALL AND OSSEOUS STRUCTURES: Chest wall is within normal limits. No acute osseous pathology.There are no suspicious osseous lesions.Multilevel degenerative changes are present in the thoracic spine without evidence of compression fractures or high-grade spinal canal stenosis.       1. In the interim since prior exam on 05/15/2023, new 2.4 x 2.2 cm spiculated, masslike opacity is present along the pleura in the inferior right lung with associated interstitial thickening and micro nodularity along the periphery. Given history of previous right lung carcinoma, finding is highly suspicious for local recurrence, until proven otherwise. 2. Several additional new nodules/increased are present in the inferior right lung and along the pleura in the left upper and left lower lobe, nonspecific findings that may represent focal infectious/inflammatory processes, such as bronchiolitis, although metastatic disease is not entirely excluded. 3. No pulmonary embolism is identified. 4. No new enlarged lymphadenopathy is identified.  5. Patulous appearance of fluid-filled esophagus, similar in appearance to prior exam, correlate with dysmotility.     MACRO: None   Signed by: Seda Recinos 10/4/2023 7:00 PM Dictation workstation:   [id number]    Electrocardiogram 12 Lead    Result Date: 9/29/2023  Normal sinus rhythm Inferior infarct , age undetermined Abnormal ECG When compared with ECG of 27-JUL-2020 05:14, Vent. rate has decreased BY  31 BPM Inferior infarct is now Present Nonspecific T wave abnormality no longer evident in Anterolateral leads Confirmed by Sander Carroll ([id number]) on 9/29/2023 4:33:26 PM        Assessment/Plan     Acute Hypoxic respiratory failure  Acute COPD excerebration  Shortness of breath  Hx lung CA with RLL lung resection, last chemo 3 years ago,   -pulmonary consult  -CT chest: new 2.4 x 2.2cm masslike opacity, several new nodules LLL  -Solumedrol Q8  -c/w home inhalers  -Duonebs PRN  -on 2L NC (baseline 2L NC at hs)    Hx Paroxysmal Afib,  -c/w Metop    Hx hypothyroidism,  -c/w synthroid    Hx HLD  -c/w statin    DVT prophylaxis  Lovenox      I spent 75 minutes in the professional and overall care of this patient.      PERNELL Ronquillo-CNP

## 2023-10-06 ENCOUNTER — TELEPHONE (OUTPATIENT)
Dept: ADMISSION | Facility: HOSPITAL | Age: 73
End: 2023-10-06
Payer: MEDICARE

## 2023-10-06 DIAGNOSIS — R91.8 LUNG MASS: ICD-10-CM

## 2023-10-06 DIAGNOSIS — J44.9 CHRONIC OBSTRUCTIVE PULMONARY DISEASE, UNSPECIFIED COPD TYPE (MULTI): Primary | ICD-10-CM

## 2023-10-06 NOTE — TELEPHONE ENCOUNTER
Patient calling to inform Dr. Iglesias she was at ED [facility] yesterday and there is a mass in the right lung.          Per Dr. Iglesias a scheduling order placed for Oct. 11, 2023 for a clinic visit with Dr. Iglesias.    Called the patient to inform, no answer, LM on personal VM to inform.

## 2023-10-10 ENCOUNTER — TELEPHONE (OUTPATIENT)
Dept: HEMATOLOGY/ONCOLOGY | Facility: HOSPITAL | Age: 73
End: 2023-10-10
Payer: MEDICARE

## 2023-10-10 DIAGNOSIS — C34.90 MALIGNANT NEOPLASM OF BRONCHUS AND LUNG (MULTI): Primary | ICD-10-CM

## 2023-10-10 NOTE — TELEPHONE ENCOUNTER
Breonna calls today to state that she was in [facility] last week and discovered that there is a new mass on her right lung that she was told was likely cancerous. She states that she is supposed to have a FUV with Dr. Iglesias tomorrow (nothing in appointments) and needs to have a PET scan ordered.  Message sent to team.

## 2023-10-11 ENCOUNTER — OFFICE VISIT (OUTPATIENT)
Dept: HEMATOLOGY/ONCOLOGY | Facility: CLINIC | Age: 73
End: 2023-10-11
Payer: MEDICARE

## 2023-10-11 VITALS
RESPIRATION RATE: 18 BRPM | DIASTOLIC BLOOD PRESSURE: 80 MMHG | WEIGHT: 160.72 LBS | HEART RATE: 74 BPM | BODY MASS INDEX: 31.39 KG/M2 | TEMPERATURE: 97.3 F | SYSTOLIC BLOOD PRESSURE: 124 MMHG | OXYGEN SATURATION: 94 %

## 2023-10-11 DIAGNOSIS — C34.90 MALIGNANT NEOPLASM OF BRONCHUS AND LUNG (MULTI): ICD-10-CM

## 2023-10-11 DIAGNOSIS — J69.0 ASPIRATION PNEUMONIA OF BOTH LUNGS DUE TO REGURGITATED FOOD, UNSPECIFIED PART OF LUNG (MULTI): Primary | ICD-10-CM

## 2023-10-11 PROBLEM — K21.00 CHRONIC REFLUX ESOPHAGITIS: Status: ACTIVE | Noted: 2023-10-11

## 2023-10-11 PROBLEM — G44.219 EPISODIC TENSION-TYPE HEADACHE, NOT INTRACTABLE: Status: ACTIVE | Noted: 2023-10-11

## 2023-10-11 PROBLEM — L25.9 CONTACT DERMATITIS: Status: ACTIVE | Noted: 2023-10-11

## 2023-10-11 PROBLEM — E87.6 HYPOKALEMIA: Status: ACTIVE | Noted: 2023-10-11

## 2023-10-11 PROBLEM — J01.90 ACUTE SINUSITIS: Status: RESOLVED | Noted: 2023-10-11 | Resolved: 2023-10-11

## 2023-10-11 PROBLEM — I25.84 CORONARY ARTERY CALCIFICATION: Status: ACTIVE | Noted: 2023-10-11

## 2023-10-11 PROBLEM — R09.81 NASAL CONGESTION: Status: ACTIVE | Noted: 2023-10-11

## 2023-10-11 PROBLEM — R00.2 PALPITATION: Status: ACTIVE | Noted: 2023-10-11

## 2023-10-11 PROBLEM — R68.89 NONSPECIFIC ABNORMAL FINDING: Status: ACTIVE | Noted: 2023-10-11

## 2023-10-11 PROBLEM — R60.9 EDEMA: Status: ACTIVE | Noted: 2023-10-11

## 2023-10-11 PROBLEM — I25.10 CORONARY ARTERY CALCIFICATION: Status: ACTIVE | Noted: 2023-10-11

## 2023-10-11 PROBLEM — E03.9 ACQUIRED HYPOTHYROIDISM: Status: ACTIVE | Noted: 2019-03-20

## 2023-10-11 PROBLEM — M15.9 POLYOSTEOARTHRITIS, UNSPECIFIED: Status: ACTIVE | Noted: 2023-10-11

## 2023-10-11 PROBLEM — R26.89 IMPAIRMENT OF BALANCE: Status: ACTIVE | Noted: 2019-03-20

## 2023-10-11 PROBLEM — R25.2 MUSCLE CRAMP, NOCTURNAL: Status: ACTIVE | Noted: 2023-10-11

## 2023-10-11 PROBLEM — M54.2 NECK PAIN: Status: ACTIVE | Noted: 2023-10-11

## 2023-10-11 PROBLEM — F33.9 RECURRENT MAJOR DEPRESSIVE DISORDER (CMS-HCC): Status: ACTIVE | Noted: 2023-10-11

## 2023-10-11 PROBLEM — R10.9 ABDOMINAL PAIN: Status: ACTIVE | Noted: 2023-10-11

## 2023-10-11 PROBLEM — R07.81 CHEST PAIN, PLEURITIC: Status: ACTIVE | Noted: 2023-10-11

## 2023-10-11 PROBLEM — R55 NEAR SYNCOPE: Status: ACTIVE | Noted: 2023-10-11

## 2023-10-11 PROBLEM — G47.9 SLEEP DISTURBANCES: Status: ACTIVE | Noted: 2019-03-20

## 2023-10-11 PROBLEM — E78.49 OTHER HYPERLIPIDEMIA: Status: ACTIVE | Noted: 2019-03-20

## 2023-10-11 PROBLEM — R53.83 FATIGUE: Status: ACTIVE | Noted: 2023-10-11

## 2023-10-11 PROBLEM — R31.9 BLOOD IN URINE: Status: ACTIVE | Noted: 2023-10-11

## 2023-10-11 PROBLEM — M25.539 PAIN, WRIST JOINT: Status: ACTIVE | Noted: 2023-10-11

## 2023-10-11 PROBLEM — R03.0 ELEVATED BLOOD PRESSURE READING WITHOUT DIAGNOSIS OF HYPERTENSION: Status: ACTIVE | Noted: 2023-10-11

## 2023-10-11 PROBLEM — M72.2 PLANTAR FASCIITIS: Status: ACTIVE | Noted: 2023-10-11

## 2023-10-11 PROBLEM — M17.11 PRIMARY LOCALIZED OSTEOARTHRITIS OF RIGHT KNEE: Status: ACTIVE | Noted: 2023-10-11

## 2023-10-11 PROBLEM — M54.50 LOW BACK PAIN: Status: ACTIVE | Noted: 2023-10-11

## 2023-10-11 PROBLEM — K44.9 GASTROESOPHAGEAL REFLUX DISEASE WITH HIATAL HERNIA: Status: ACTIVE | Noted: 2023-05-03

## 2023-10-11 PROBLEM — H25.9 AGE-RELATED CATARACT OF LEFT EYE: Status: ACTIVE | Noted: 2019-03-20

## 2023-10-11 PROBLEM — R26.81 GAIT INSTABILITY: Status: ACTIVE | Noted: 2023-10-11

## 2023-10-11 PROBLEM — R42 DIZZINESS: Status: ACTIVE | Noted: 2019-03-20

## 2023-10-11 PROBLEM — R91.8 PULMONARY NODULES: Status: ACTIVE | Noted: 2023-10-11

## 2023-10-11 PROBLEM — N39.0 URINARY TRACT INFECTION: Status: RESOLVED | Noted: 2023-10-11 | Resolved: 2023-10-11

## 2023-10-11 PROBLEM — D72.829 LEUKOCYTOSIS: Status: ACTIVE | Noted: 2023-10-11

## 2023-10-11 PROBLEM — Z99.81 DEPENDENCE ON SUPPLEMENTAL OXYGEN: Status: ACTIVE | Noted: 2021-01-18

## 2023-10-11 PROBLEM — M25.569 KNEE PAIN: Status: ACTIVE | Noted: 2023-10-11

## 2023-10-11 PROBLEM — G62.9 POLYNEUROPATHY: Status: ACTIVE | Noted: 2021-01-18

## 2023-10-11 PROBLEM — R29.818 ABNORMAL ROMBERG TEST: Status: ACTIVE | Noted: 2023-10-11

## 2023-10-11 PROBLEM — F17.200 NICOTINE DEPENDENCE: Status: ACTIVE | Noted: 2023-10-11

## 2023-10-11 PROBLEM — M47.812 SPONDYLOSIS OF CERVICAL REGION WITHOUT MYELOPATHY OR RADICULOPATHY: Status: ACTIVE | Noted: 2023-10-11

## 2023-10-11 PROBLEM — J32.9 SINUSITIS: Status: ACTIVE | Noted: 2023-10-11

## 2023-10-11 PROBLEM — K59.00 CONSTIPATION: Status: ACTIVE | Noted: 2023-10-11

## 2023-10-11 PROBLEM — M79.671 RIGHT FOOT PAIN: Status: ACTIVE | Noted: 2023-10-11

## 2023-10-11 PROBLEM — R12 HEARTBURN: Status: ACTIVE | Noted: 2023-10-11

## 2023-10-11 PROBLEM — R51.9 ACUTE HEADACHE: Status: RESOLVED | Noted: 2023-10-11 | Resolved: 2023-10-11

## 2023-10-11 PROBLEM — R05.9 COUGH: Status: RESOLVED | Noted: 2023-10-11 | Resolved: 2023-10-11

## 2023-10-11 PROBLEM — G62.9 NEUROPATHY: Status: ACTIVE | Noted: 2023-10-11

## 2023-10-11 PROBLEM — M48.02 STENOSIS, CERVICAL SPINE: Status: ACTIVE | Noted: 2023-10-11

## 2023-10-11 PROBLEM — J18.9 PNEUMONIA: Status: ACTIVE | Noted: 2023-10-11

## 2023-10-11 PROBLEM — R06.09 DYSPNEA ON EXERTION: Status: ACTIVE | Noted: 2023-05-03

## 2023-10-11 PROBLEM — L08.9 LOCAL INFECTION OF SKIN AND SUBCUTANEOUS TISSUE: Status: ACTIVE | Noted: 2023-10-11

## 2023-10-11 PROBLEM — R79.89 D-DIMER, ELEVATED: Status: ACTIVE | Noted: 2023-10-11

## 2023-10-11 PROCEDURE — 1036F TOBACCO NON-USER: CPT | Performed by: INTERNAL MEDICINE

## 2023-10-11 PROCEDURE — 99215 OFFICE O/P EST HI 40 MIN: CPT | Performed by: INTERNAL MEDICINE

## 2023-10-11 PROCEDURE — 1160F RVW MEDS BY RX/DR IN RCRD: CPT | Performed by: INTERNAL MEDICINE

## 2023-10-11 PROCEDURE — 1126F AMNT PAIN NOTED NONE PRSNT: CPT | Performed by: INTERNAL MEDICINE

## 2023-10-11 PROCEDURE — 1159F MED LIST DOCD IN RCRD: CPT | Performed by: INTERNAL MEDICINE

## 2023-10-11 RX ORDER — LEVOTHYROXINE SODIUM 25 UG/1
25 TABLET ORAL DAILY
COMMUNITY
Start: 2019-01-26 | End: 2023-11-21 | Stop reason: WASHOUT

## 2023-10-11 RX ORDER — CALCIUM CARBONATE 200(500)MG
TABLET,CHEWABLE ORAL
COMMUNITY
Start: 2023-08-11 | End: 2024-03-12 | Stop reason: WASHOUT

## 2023-10-11 RX ORDER — TALC
1 POWDER (GRAM) TOPICAL NIGHTLY PRN
COMMUNITY
Start: 2022-04-11 | End: 2024-03-21 | Stop reason: WASHOUT

## 2023-10-11 RX ORDER — ROSUVASTATIN CALCIUM 20 MG/1
1 TABLET, COATED ORAL DAILY
COMMUNITY
Start: 2023-09-21 | End: 2024-02-19 | Stop reason: SDUPTHER

## 2023-10-11 RX ORDER — GABAPENTIN 300 MG/1
300 CAPSULE ORAL 4 TIMES DAILY
COMMUNITY
End: 2023-11-21 | Stop reason: WASHOUT

## 2023-10-11 RX ORDER — LEVOTHYROXINE SODIUM 200 UG/1
200 TABLET ORAL
COMMUNITY
End: 2023-11-21 | Stop reason: WASHOUT

## 2023-10-11 RX ORDER — AMOXICILLIN AND CLAVULANATE POTASSIUM 875; 125 MG/1; MG/1
875 TABLET, FILM COATED ORAL 2 TIMES DAILY
Qty: 14 TABLET | Refills: 0 | Status: SHIPPED | OUTPATIENT
Start: 2023-10-11 | End: 2023-10-24 | Stop reason: SDUPTHER

## 2023-10-11 RX ORDER — OXYBUTYNIN CHLORIDE 5 MG/1
2 TABLET ORAL DAILY
COMMUNITY
End: 2023-11-21 | Stop reason: WASHOUT

## 2023-10-11 RX ORDER — MULTIVITAMIN/IRON/FOLIC ACID 18MG-0.4MG
1 TABLET ORAL DAILY
COMMUNITY
End: 2024-03-12 | Stop reason: WASHOUT

## 2023-10-11 RX ORDER — MULTIVITAMIN
1 TABLET ORAL DAILY
COMMUNITY
Start: 2009-03-04 | End: 2024-03-12 | Stop reason: WASHOUT

## 2023-10-11 RX ORDER — FLUTICASONE PROPIONATE 50 MCG
1 SPRAY, SUSPENSION (ML) NASAL DAILY
COMMUNITY

## 2023-10-11 RX ORDER — ACETAMINOPHEN 500 MG
2000 TABLET ORAL
COMMUNITY
End: 2024-03-21 | Stop reason: WASHOUT

## 2023-10-11 RX ORDER — NABUMETONE 500 MG/1
500 TABLET, FILM COATED ORAL 2 TIMES DAILY
COMMUNITY
End: 2023-11-21 | Stop reason: WASHOUT

## 2023-10-11 RX ORDER — LEVOTHYROXINE SODIUM 150 UG/1
1 TABLET ORAL DAILY
COMMUNITY
Start: 2022-05-27 | End: 2023-11-21 | Stop reason: WASHOUT

## 2023-10-11 RX ORDER — GABAPENTIN 300 MG/1
2 CAPSULE ORAL 2 TIMES DAILY
COMMUNITY
Start: 2019-01-26 | End: 2024-03-01 | Stop reason: SDUPTHER

## 2023-10-11 RX ORDER — BUTALB/ACETAMINOPHEN/CAFFEINE 50-325-40
TABLET ORAL
COMMUNITY
Start: 2009-03-04 | End: 2024-03-21 | Stop reason: ALTCHOICE

## 2023-10-11 RX ORDER — FAMOTIDINE 20 MG/1
TABLET, FILM COATED ORAL AS NEEDED
COMMUNITY
Start: 2023-08-11 | End: 2024-03-12 | Stop reason: WASHOUT

## 2023-10-11 RX ORDER — ALBUTEROL SULFATE 0.83 MG/ML
2.5 SOLUTION RESPIRATORY (INHALATION) EVERY 6 HOURS
COMMUNITY
Start: 2023-10-04 | End: 2024-05-07 | Stop reason: SDUPTHER

## 2023-10-11 ASSESSMENT — COLUMBIA-SUICIDE SEVERITY RATING SCALE - C-SSRS
1. IN THE PAST MONTH, HAVE YOU WISHED YOU WERE DEAD OR WISHED YOU COULD GO TO SLEEP AND NOT WAKE UP?: NO
2. HAVE YOU ACTUALLY HAD ANY THOUGHTS OF KILLING YOURSELF?: NO
6. HAVE YOU EVER DONE ANYTHING, STARTED TO DO ANYTHING, OR PREPARED TO DO ANYTHING TO END YOUR LIFE?: NO

## 2023-10-11 ASSESSMENT — PATIENT HEALTH QUESTIONNAIRE - PHQ9
1. LITTLE INTEREST OR PLEASURE IN DOING THINGS: NOT AT ALL
2. FEELING DOWN, DEPRESSED OR HOPELESS: NOT AT ALL
SUM OF ALL RESPONSES TO PHQ9 QUESTIONS 1 AND 2: 0

## 2023-10-11 ASSESSMENT — ENCOUNTER SYMPTOMS
OCCASIONAL FEELINGS OF UNSTEADINESS: 0
LOSS OF SENSATION IN FEET: 0
DEPRESSION: 0

## 2023-10-11 ASSESSMENT — PAIN SCALES - GENERAL: PAINLEVEL: 0-NO PAIN

## 2023-10-11 NOTE — PROGRESS NOTES
Patient ID: Sylwia Greenberg is a 73 y.o. 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  Negative for BRAF V600E.  Negative for EGFR (sensitizing) mutation.  Negative for NTRK fusion.  Negative for ROS1 fusion.  Negative for RET fusion.  Negative for MET exon 14 skipping mutation.       PRIOR THERAPIES  4 cycles of adjuvant cisplatin/docetaxel from 10/30/2020 through 12/30/2020.     CURRENT THERAPY  Surveillance    CURRENT ONCOLOGICAL PROBLEMS  None     HISTORY OF PRESENT ILLNESS  smoker, who was found to have on a low dose screening CT a RLL mass. She was completely asymptomatic. Denies weight loss or fatigue  or respiratory sx.       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 Intra-operatively the tumor was found to be abutting the right  atrium -   pT4N0-   Tumor is TPS 35%      Meets with  medical oncology on 08/12/20:   After her surgery she developed PNS that led to a readmission less than 24 hours after discharge - Her last Levaquin dose was 2 days ago   She feels now very weak   Appetite is recovering -   She is using O2 (93-96%)  Endorses insomnia - using melatonin 5 mg to no avail     08/14/20: MRI brain shows no intracranial metastasis      08/16/20: admitted to [facility] with atrial tachycardia vs atypical flutter secondary to post-operative pericarditis. On amiodarone and colchicine (for 3 months) - following with cardiology.      10/30/20: C1D1 cisplatin/docetaxel   11/18/20: C2D1 cisplatin/docetaxel   12/09/20: C3D1 cisplatin/docetaxel   12/30/20: C4D1 cisplatin/docetaxel      PAST MEDICAL HISTORY  COPD  Gout  Osteopenia  Chemotherapy-induced peripheral neuropathy/chest wall pain related to prior to VATS procedure  Hypothyroidism  Insomnia  Hyperlipidemia  Hiatal hernia   GERD     SOCIAL HISTORY  She used to work as a  (CPA)-   She is  and lives alone  Has 3 children -   There is no occupational history     FAMILY HISTORY  Noncontributory    CURRENT MEDS REVIEWED       ALLERGIES  NKDA     SUBJECTIVE:  Patient called and asked to be seen sooner than his scheduled surveillance appointment in November due to recent visit to the ED on 10/04/2023.  Patient presented with complains of worsening dyspnea and productive cough over the last several weeks.  While there, she had stable vital signs, with no fevers documented.  A CTA was obtained demonstrating findings most concerning for (according to my personal review of imaging) of an infectious process.  She had pleural-based consolidative changes in the right middle lobe, with associated air bronchograms as well as multiple contralateral inflammatory nodules.  No associated adenopathy or new liver/adrenal gland lesions identified.  Upon further review, the patient states she has been feeling as food gets stuck in her  esophagus and she has trouble swallowing.  She has been evaluated by gastroenterology for that specific concern.  She has been treated with a PPI for potential GERD.  She does have a hiatal hernia.  She has remained very active, if anything more active over the last several months.  She has for instance been playing cheer volleyball.  She is seeing pulmonary and following up with her COPD management (Dr. Field).  Today she states she continues to have a productive cough of a green/yellowish sputum.  Denies hemoptysis.  Denies new onset chest pain.  There are no new headaches or neurological symptoms.  Denies fevers.    A 13 point review of systems was performed, with significant findings documented above in subjective history.    OBJECTIVE:  Vitals:    10/11/23 1414   BP: 124/80   Pulse: 74   Resp: 18   Temp: 36.3 °C (97.3 °F)   SpO2: 94%      Body surface area is 1.76 meters squared.     Physical Exam  Constitutional:       Appearance: Normal appearance.   HENT:      Head: Atraumatic.   Eyes:      Extraocular Movements: Extraocular movements intact.      Conjunctiva/sclera: Conjunctivae normal.   Cardiovascular:      Rate and Rhythm: Normal rate.   Pulmonary:      Effort: Pulmonary effort is normal.      Breath sounds: Rhonchi present.      Comments: Very decreased BS throughout both lung fields   Abdominal:      Palpations: Abdomen is soft.      Tenderness: There is no abdominal tenderness. There is no guarding.   Skin:     General: Skin is warm and dry.      Findings: No rash.   Neurological:      General: No focal deficit present.      Mental Status: She is alert and oriented to person, place, and time.      Motor: No weakness.      Gait: Gait normal.   Psychiatric:         Mood and Affect: Mood normal.         Behavior: Behavior normal.              Diagnostic Results   Results:  Labs:  Lab Results   Component Value Date    WBC 6.4 10/05/2023    HGB 12.5 10/05/2023    HCT 38.4 10/05/2023    MCV 94  10/05/2023     10/05/2023      Lab Results   Component Value Date    NEUTROABS 6.87 (H) 10/04/2023      Lab Results   Component Value Date    GLUCOSE 134 (H) 10/05/2023    CALCIUM 9.6 10/05/2023     10/05/2023    K 3.8 10/05/2023    CO2 24 10/05/2023     10/05/2023    BUN 17 10/05/2023    CREATININE 0.83 10/05/2023    MG 2.06 05/15/2023     Lab Results   Component Value Date    ALT 23 10/04/2023    AST 33 10/04/2023    ALKPHOS 92 10/04/2023    BILITOT 0.5 10/04/2023      Lab Results   Component Value Date    TSH 21.78 (H) 06/26/2023    FREET4 1.14 06/26/2023     STUDY:  CT ANGIO CHEST FOR PULMONARY EMBOLISM;  10/4/2023 6:44 pm      INDICATION:  Signs/Symptoms:sob.      COMPARISON:  CT chest dated 05/15/2023      ACCESSION NUMBER(S):  [id number]      ORDERING CLINICIAN:  CINDY JAUREGUI      TECHNIQUE:  Helical data acquisition of the chest was obtained after intravenous  administration of 75 mLof Omnipaque 350, as per PE protocol. Images  were reformatted in coronal and sagittal planes. Axial and coronal  maximum intensity projection (MIP) images were created and reviewed.  In addition, dual energy reconstructions were also performed  including low energy virtual mono-energetic images and iodine density  maps.      FINDINGS:  POTENTIAL LIMITATIONS OF THE STUDY: None      HEART AND VESSELS:  There are no discrete filling defects within main pulmonary artery  and its branches to suggest acute pulmonary embolism. Main pulmonary  artery and its branches are normal in caliber.      Mild ectasia of the ascending thoracic aorta is present, which  measures up to 3.8 cm in diameter and demonstrates mild vascular  calcifications. Although, the study is not tailored for evaluation of  aorta, there is no definite evidence of acute aortic pathology.  Moderate coronary artery calcifications are seen. Please note,the  study is not optimized for evaluation of coronary arteries.      The cardiac chambers are not  enlarged.There are no findings to  suggest right heart strain. There is no pericardial effusion seen.      MEDIASTINUM AND BOOGIE, LOWER NECK AND AXILLA:  The visualized thyroid gland is within normal limits.  No evidence of thoracic lymphadenopathy by CT criteria.  The esophagus is patulous and fluid-filled, similar in appearance to  prior exam.      LUNGS AND AIRWAYS:  The trachea and central airways are patent. No endobronchial lesion  is seen.      Postsurgical changes of right upper lobectomy and wedge resection of  the right lower lobe are again evident.      In the interim since prior exam in May of 2023, new spiculated  masslike opacity is present in the right middle lobe (series 608,  image 171), with mild interstitial thickening and micro nodularity  present along the periphery. Several additional new nodular opacities  are present in the left upper and left lower lobe along the pleura  (series 608, image 135). Cluster of nodules is also present in the  right middle lobe more anteriorly (series 608, image 92).      UPPER ABDOMEN:  The visualized subdiaphragmatic structures demonstrate no remarkable  findings.      CHEST WALL AND OSSEOUS STRUCTURES:  Chest wall is within normal limits.  No acute osseous pathology.There are no suspicious osseous  lesions.Multilevel degenerative changes are present in the thoracic  spine without evidence of compression fractures or high-grade spinal  canal stenosis.      IMPRESSION:  1. In the interim since prior exam on 05/15/2023, new 2.4 x 2.2 cm  spiculated, masslike opacity is present along the pleura in the  inferior right lung with associated interstitial thickening and micro  nodularity along the periphery. Given history of previous right lung  carcinoma, finding is highly suspicious for local recurrence, until  proven otherwise.  2. Several additional new nodules/increased are present in the  inferior right lung and along the pleura in the left upper and left  lower  lobe, nonspecific findings that may represent focal  infectious/inflammatory processes, such as bronchiolitis, although  metastatic disease is not entirely excluded.  3. No pulmonary embolism is identified.  4. No new enlarged lymphadenopathy is identified.  5. Patulous appearance of fluid-filled esophagus, similar in  appearance to prior exam, correlate with dysmotility.    Assessment/Plan   Stage IIIA non-small cell lung cancer (squamous cell carcinoma) of the right lower lobe, status post resection followed by adjuvant systemic chemotherapy as above.  Has been on surveillance ever since.    Now presenting with new respiratory symptoms, mostly cough and dyspnea and recent CT findings consistent with aspiration pneumonia.  I have prescribed her a 7-day course of amoxicillin/clavulanate.  I will see her in follow-up after repeat imaging to assess resolution of findings.  If no improvement, consider further work-up, including PET scan and biopsies.  I asked her to follow-up with gastroenterology, due to concerns regarding potential aspiration.  She does have a large hiatal hernia and symptoms of gastroesophageal reflux.    This note was created with the assistance of a speech recognition program.  Although the intention is to generate a document that actually reflects the content of the visit, it is possible that some mistakes occur and may not be corrected by the time of completion of this note.        Lilli Iglesias MD, MS  Thoracic Medical Oncology   [address] [place] [address]  Phone: [phone]

## 2023-10-12 ENCOUNTER — HOSPITAL ENCOUNTER (OUTPATIENT)
Dept: CARDIOLOGY | Facility: CLINIC | Age: 73
Discharge: HOME | End: 2023-10-12
Payer: MEDICARE

## 2023-10-12 ENCOUNTER — TELEPHONE (OUTPATIENT)
Dept: PULMONOLOGY | Facility: CLINIC | Age: 73
End: 2023-10-12

## 2023-10-12 DIAGNOSIS — R06.00 DYSPNEA, UNSPECIFIED: ICD-10-CM

## 2023-10-12 DIAGNOSIS — R06.09 OTHER FORMS OF DYSPNEA: Primary | ICD-10-CM

## 2023-10-12 DIAGNOSIS — R06.09 OTHER FORMS OF DYSPNEA: ICD-10-CM

## 2023-10-12 PROCEDURE — 93306 TTE W/DOPPLER COMPLETE: CPT | Performed by: STUDENT IN AN ORGANIZED HEALTH CARE EDUCATION/TRAINING PROGRAM

## 2023-10-12 PROCEDURE — 93306 TTE W/DOPPLER COMPLETE: CPT

## 2023-10-12 PROCEDURE — 2500000004 HC RX 250 GENERAL PHARMACY W/ HCPCS (ALT 636 FOR OP/ED): Performed by: STUDENT IN AN ORGANIZED HEALTH CARE EDUCATION/TRAINING PROGRAM

## 2023-10-12 RX ADMIN — PERFLUTREN 3 ML OF DILUTION: 6.52 INJECTION, SUSPENSION INTRAVENOUS at 10:24

## 2023-10-13 LAB — EJECTION FRACTION APICAL 4 CHAMBER: 77.5

## 2023-10-24 ENCOUNTER — OFFICE VISIT (OUTPATIENT)
Dept: PULMONOLOGY | Facility: CLINIC | Age: 73
End: 2023-10-24
Payer: MEDICARE

## 2023-10-24 VITALS
SYSTOLIC BLOOD PRESSURE: 145 MMHG | HEART RATE: 69 BPM | WEIGHT: 160.4 LBS | DIASTOLIC BLOOD PRESSURE: 82 MMHG | BODY MASS INDEX: 31.49 KG/M2 | HEIGHT: 60 IN | TEMPERATURE: 96.8 F

## 2023-10-24 DIAGNOSIS — J69.0 ASPIRATION PNEUMONIA OF BOTH LUNGS DUE TO REGURGITATED FOOD, UNSPECIFIED PART OF LUNG (MULTI): ICD-10-CM

## 2023-10-24 DIAGNOSIS — R91.8 LUNG MASS: Primary | ICD-10-CM

## 2023-10-24 PROCEDURE — 1036F TOBACCO NON-USER: CPT | Performed by: INTERNAL MEDICINE

## 2023-10-24 PROCEDURE — 1159F MED LIST DOCD IN RCRD: CPT | Performed by: INTERNAL MEDICINE

## 2023-10-24 PROCEDURE — 99214 OFFICE O/P EST MOD 30 MIN: CPT | Performed by: INTERNAL MEDICINE

## 2023-10-24 PROCEDURE — 1160F RVW MEDS BY RX/DR IN RCRD: CPT | Performed by: INTERNAL MEDICINE

## 2023-10-24 PROCEDURE — 1126F AMNT PAIN NOTED NONE PRSNT: CPT | Performed by: INTERNAL MEDICINE

## 2023-10-24 RX ORDER — AMOXICILLIN AND CLAVULANATE POTASSIUM 875; 125 MG/1; MG/1
875 TABLET, FILM COATED ORAL 2 TIMES DAILY
Qty: 14 TABLET | Refills: 0 | Status: SHIPPED | OUTPATIENT
Start: 2023-10-24 | End: 2023-11-02 | Stop reason: SDUPTHER

## 2023-10-24 ASSESSMENT — ENCOUNTER SYMPTOMS
DYSURIA: 0
NERVOUS/ANXIOUS: 0
EYE DISCHARGE: 0
SHORTNESS OF BREATH: 0
TREMORS: 0
FACIAL SWELLING: 0
SINUS PRESSURE: 0
ARTHRALGIAS: 0
CONSTIPATION: 0
EYE REDNESS: 0
WEAKNESS: 0
DEPRESSION: 0
DIFFICULTY URINATING: 0
ABDOMINAL PAIN: 0
RHINORRHEA: 0
ADENOPATHY: 0
DIZZINESS: 0
WHEEZING: 0
HEMATURIA: 0
NAUSEA: 0
LIGHT-HEADEDNESS: 0
SPEECH DIFFICULTY: 0
HEADACHES: 0
FATIGUE: 0
FREQUENCY: 0
PALPITATIONS: 0
SLEEP DISTURBANCE: 0
ABDOMINAL DISTENTION: 0
NUMBNESS: 0
APNEA: 0
STRIDOR: 0
BRUISES/BLEEDS EASILY: 0
UNEXPECTED WEIGHT CHANGE: 0
JOINT SWELLING: 0
COUGH: 1
FEVER: 0
CHOKING: 0
CHEST TIGHTNESS: 0
AGITATION: 0
SINUS PAIN: 0

## 2023-10-24 NOTE — PROGRESS NOTES
@PULMONARY FOLLOW-UP@       PROBLEM: Pneumonia and lung cancer    ASSESSMENT: The patient is a 73-year-old with history of COPD hypothyroidism lung cancer and a hyperdynamic left ventricle with outflow obstruction.  In 2020  she was found to have lung cancer tumor involving the pulmonary vein microscopically and intraoperatively the tumor was abutting the right atrium.  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   She has been followed for shortness of breath  felt to have a hyperdynamic echocardiogram and was treated with beta-blocker.  Her PFTs have revealed moderate airflow obstruction with a significant bronchial response and she also has been treated for COPD.  She developed increasing respiratory symptoms several weeks ago and was felt to have nodular changes in the right lower lobe on CT with likely aspiration based on the appearance of the changes.  She was seen by Dr. Iglesias and accordingly she was treated with Augmentin.  She did better while she was on the antibiotic and after completed the the cough and sputum and sputum production have started coming back.  Hence I am going to give her another week of antibiotic and she has a follow-up CAT scan next week.    PLAN:I am going to place her on continued Augmentin over the next week and she has a follow-up CT scan next week.        HISTORY OF PRESENT ILLNESS:This patient is 73-year-old with COPD and hypothyroidism with a history of lung cancer who was seen on March 30, 2022 elevated February of Complaining of shortness of breath over the last 4 months. After her right lower lobectomy she had atrial flutter and Pseudomonas pneumonia. She had the onset of shortness of breath over the previous 4 months without a definite exacerbation of COPD. Her PFTs performed on March 11, 2022 revealed a severely reduced DLCO but unchanged with a moderate degree of unchanged airflow  obstruction. Her echocardiogram was unchanged with a mild elevation of her RVSP. With the relatively recent onset of shortness of breath I checked a D-dimer which was elevated. CFT angiogram revealed no PE on March 11, 2022. There was evidence of the previous right lower lobectomy. She was going to see cardiology for completeness.     She ended up having a repeat CT angiogram in May 26, 2022 which revealed no PE. There was volume loss within the lingula which was new compared to the previous study. Also there was dilated esophagus with air-fluid levels and thickening distally associated with small hiatal hernia. Overall appearance similar to the recent prior. She did undergo an EGD on June 29, 2022. There is no evidence of any gastritis or H. pylori.        The CT scan from November 11, 2022 revealed the following: There has been interval clearing of previously seen sub-centimeter ground-glass centrilobular nodules in the mid aspect right upper lobe. However more inferiorly in the right upper lobe there has been interval development of peripheral predominant patchy ground-glass opacities and pleural based nodules (series 205 images 191-211). A few additional small peripheral ground-glass opacities in the right upper lobe (image 154/309), left upper lobe anterior segment (image 116/309), and left lower lobe along the major fissure (image 113/309) are new from prior. There has been an interval decrease in atelectasis in the lingula of the left upper lobe. There is mild-to-moderate apical predominant centrilobular emphysema.   As noted above her PFTs reveal moderate airflow obstruction with a significant bronchodilator response but because of her dyspnea she ended up seeing Dr. Carroll. It was felt that she had a hyperdynamic obstruction with a normal ejection fraction. Because of the outflow obstruction she was placed on metoprolol.     She was last seen on November 21, 2022 and stated that about a month ago she  developed increasing shortness of breath and congestion and was seen by a home care service and placed on prednisone and antibiotics. She got better initially but then had increasing shortness of breath with thick phlegm production. She ended up having the CT scan as outlined above on November 11, 2022 revealing the groundglass changes. Her emergency room assessment for COVID was negative. She continues to have thick phlegm production and has tried Mucinex.  It was felt that the groundglass change could be related to a postviral illness.  Also potentially beta-blocker was potentiating her COPD.  I gave her a prednisone taper and albuterol nebulization.  A follow-up CT scan was to be obtained in 3 months..  She also had a COPD flare as was reported on December 20, 2022.    A follow-up CT scan from November 12, 2022 outlined the clearing of some parenchymal changes on the previous study but there were new opacities in other areas with the finding suggesting waxing waning of infectious or inflammatory process.  A follow-up study from 8/15/2023 outlined no suspicious nodularity.    She recently saw Dr. Carroll on September 21, 2023 and described increasing shortness of breath.  He had noted a hyperdynamic echocardiogram in 2022 and she was having some palpitation.  A repeat echocardiogram was to be obtained along with a monitor and she was continued on her beta-blocker.  Surrounding that visit she noted shortness of breath walking 20-30 steps.  She also had some nasal discharge.  Furthermore she saw GI on September 22, 2023 for esophageal reflux.    It was noted that she was admitted to the hospital October 4 through October 5, 2023 with increasing shortness of breath.  She was found to have a new 2.4 x 2.2 cm masslike opacity.  Her COPD was treated with corticosteroids and bronchodilators.  The echocardiogram from October 12, 2023 outlined hyperdynamic ventricle with ejection fraction of 77% with diastolic dysfunction.   The RVSP was normal.  The changes from the CT scan from October 4, 2023 during the hospitalization appeared postinflammatory in nature and she was placed on Augmentin as outlined by Dr. Iglesias.  There were also some additional areas of nodularity which had increased inferiorly in the right lung and the left upper lobe    The patient finished the antibiotic about a week ago and this began coughing again.  She is bringing up yellow phlegm.  She has a drip in her throat and congestion.  She has low-grade fevers.  No chest pains or pressures.  She has no chills.      No Known Allergies         Current Outpatient Medications:     albuterol 2.5 mg /3 mL (0.083 %) nebulizer solution, Take 3 mL (2.5 mg) by nebulization every 6 hours., Disp: , Rfl:     albuterol sulfate (Proair Digihaler) 90 mcg/actuation aero powdr breath act w/sensor inhaler, Inhale 2 puffs every 6 hours if needed for wheezing., Disp: , Rfl:     alendronate (Fosamax) 70 mg tablet, TAKE 1 TABLET BY MOUTH WEEKLY  WITH 8 OZ OF PLAIN WATER 30  MINUTES BEFORE FIRST FOOD, DRINK OR MEDS. STAY UPRIGHT FOR 30  MINS, Disp: 12 tablet, Rfl: 3    allopurinol (Zyloprim) 100 mg tablet, TAKE 1 TABLET BY MOUTH DAILY AS  DIRECTED, Disp: 90 tablet, Rfl: 3    amoxicillin-pot clavulanate (Augmentin) 875-125 mg tablet, Take 1 tablet (875 mg) by mouth 2 times a day for 7 days., Disp: 14 tablet, Rfl: 0    atorvastatin (Lipitor) 40 mg tablet, TAKE 1 TABLET BY MOUTH ONCE  DAILY, Disp: 100 tablet, Rfl: 2    b complex 0.4 mg tablet, Take 1 tablet by mouth once daily., Disp: , Rfl:     biotin 5 mg capsule, Take 1 capsule (5 mg) by mouth once daily., Disp: , Rfl:     calcium carbonate (Tums) 200 mg calcium chewable tablet, Chew., Disp: , Rfl:     calcium citrate-vitamin D3 (Citracal+D) 315 mg-5 mcg (200 unit) tablet, Take by mouth., Disp: , Rfl:     cholecalciferol (Vitamin D-3) 125 MCG (5000 UT) capsule, Take 1 capsule (125 mcg) by mouth once daily., Disp: , Rfl:     cholecalciferol  (Vitamin D-3) 50 mcg (2,000 unit) capsule, Take 1 capsule (2,000 Units) by mouth., Disp: , Rfl:     DULoxetine (Cymbalta) 30 mg DR capsule, Take 1 capsule (30 mg) by mouth once daily., Disp: 90 capsule, Rfl: 1    famotidine (Pepcid) 20 mg tablet, Take by mouth if needed., Disp: , Rfl:     fluticasone (Flonase) 50 mcg/actuation nasal spray, Administer 1 spray into each nostril once daily. Shake gently. Before first use, prime pump. After use, clean tip and replace cap., Disp: , Rfl:     gabapentin (Neurontin) 300 mg capsule, Take 1 capsule (300 mg) by mouth 2 times a day., Disp: 180 capsule, Rfl: 1    gabapentin (Neurontin) 300 mg capsule, Take 2 capsules (600 mg) by mouth twice a day., Disp: , Rfl:     gabapentin (Neurontin) 300 mg capsule, Take 1 capsule (300 mg) by mouth 4 times a day., Disp: , Rfl:     levothyroxine (Synthroid, Levoxyl) 125 mcg tablet, Take 1 tablet (125 mcg) by mouth once daily in the morning. Take before meals., Disp: , Rfl:     levothyroxine (Synthroid, Levoxyl) 150 mcg tablet, Take 1 tablet (150 mcg) by mouth once daily., Disp: , Rfl:     levothyroxine (Synthroid, Levoxyl) 200 mcg tablet, Take 1 tablet (200 mcg) by mouth once daily in the morning. Take before meals., Disp: , Rfl:     levothyroxine (Synthroid, Levoxyl) 25 mcg tablet, Take 1 tablet (25 mcg) by mouth once daily., Disp: , Rfl:     MAGNESIUM ORAL, Take 500 mg by mouth., Disp: , Rfl:     melatonin 3 mg tablet, Take 1 tablet (3 mg) by mouth as needed at bedtime., Disp: , Rfl:     metoprolol succinate XL (Toprol-XL) 50 mg 24 hr tablet, Take 1 tablet (50 mg) by mouth once daily., Disp: , Rfl:     multivitamin tablet, Take 1 tablet by mouth once daily., Disp: , Rfl:     multivitamin with folic acid (One Daily Multivitamin) 400 mcg tablet, Take 1 tablet by mouth once daily., Disp: , Rfl:     nabumetone (Relafen) 500 mg tablet, Take 1 tablet (500 mg) by mouth twice a day., Disp: , Rfl:     NON FORMULARY, Take by mouth once daily.  HYDROEYE- one capsule, Disp: , Rfl:     oxybutynin (Ditropan) 5 mg tablet, Take 1 tablet (5 mg) by mouth once daily., Disp: 90 tablet, Rfl: 1    oxybutynin (Ditropan) 5 mg tablet, Take 2 tablets (10 mg) by mouth once daily., Disp: , Rfl:     pantoprazole (ProtoNix) 20 mg EC tablet, Take 1 tablet (20 mg) by mouth 2 times a day. Do not crush, chew, or split., Disp: , Rfl:     potassium chloride ER (Micro-K) 8 mEq ER capsule, Take 1 capsule (8 mEq) by mouth 2 times a day., Disp: , Rfl:     rosuvastatin (Crestor) 20 mg tablet, Take 1 tablet (20 mg) by mouth once daily., Disp: , Rfl:     traZODone (Desyrel) 50 mg tablet, Take 1 tablet (50 mg) by mouth as needed at bedtime for depression., Disp: 90 tablet, Rfl: 1    umeclidinium-vilanteroL (Anoro Ellipta) 62.5-25 mcg/actuation blister with device, Inhale 1 puff once daily., Disp: , Rfl:           Review of Systems   Constitutional:  Negative for fatigue, fever and unexpected weight change.   HENT:  Negative for congestion, facial swelling, nosebleeds, postnasal drip, rhinorrhea, sinus pressure and sinus pain.    Eyes:  Negative for discharge, redness and visual disturbance.   Respiratory:  Positive for cough. Negative for apnea, choking, chest tightness, shortness of breath, wheezing and stridor.         Yellow phlegm production    Cardiovascular:  Negative for chest pain, palpitations and leg swelling.   Gastrointestinal:  Negative for abdominal distention, abdominal pain, constipation and nausea.   Endocrine: Negative for cold intolerance and heat intolerance.   Genitourinary:  Negative for difficulty urinating, dysuria, frequency and hematuria.   Musculoskeletal:  Negative for arthralgias, gait problem and joint swelling.   Allergic/Immunologic: Negative for environmental allergies, food allergies and immunocompromised state.   Neurological:  Negative for dizziness, tremors, syncope, speech difficulty, weakness, light-headedness, numbness and headaches.    Hematological:  Negative for adenopathy. Does not bruise/bleed easily.   Psychiatric/Behavioral:  Negative for agitation, behavioral problems and sleep disturbance. The patient is not nervous/anxious.         Vitals:    10/24/23 0942   BP: 145/82   Pulse: 69   Temp: 36 °C (96.8 °F)        Physical Exam  Vitals reviewed.   Constitutional:       Appearance: Normal appearance.   HENT:      Head: Normocephalic and atraumatic.   Eyes:      Extraocular Movements: Extraocular movements intact.   Cardiovascular:      Rate and Rhythm: Normal rate and regular rhythm.      Heart sounds: No murmur heard.     No friction rub. No gallop.   Pulmonary:      Effort: Pulmonary effort is normal. No respiratory distress.      Breath sounds: Normal breath sounds. No stridor. No wheezing, rhonchi or rales.   Chest:      Chest wall: No tenderness.   Abdominal:      General: Abdomen is flat. There is no distension.      Palpations: Abdomen is soft. There is no mass.      Tenderness: There is no abdominal tenderness.   Musculoskeletal:         General: Normal range of motion.      Cervical back: Normal range of motion.      Right lower leg: No edema.      Left lower leg: No edema.   Skin:     General: Skin is warm and dry.   Neurological:      Mental Status: She is alert and oriented to person, place, and time.   Psychiatric:         Mood and Affect: Mood normal.         Behavior: Behavior normal.

## 2023-10-24 NOTE — PATIENT INSTRUCTIONS
I am going to place her on continued Augmentin over the next week and she has a follow-up CT scan next week.

## 2023-10-30 ENCOUNTER — ANCILLARY PROCEDURE (OUTPATIENT)
Dept: RADIOLOGY | Facility: CLINIC | Age: 73
End: 2023-10-30
Payer: MEDICARE

## 2023-10-30 DIAGNOSIS — C34.90 MALIGNANT NEOPLASM OF BRONCHUS AND LUNG (MULTI): ICD-10-CM

## 2023-10-30 DIAGNOSIS — J69.0 ASPIRATION PNEUMONIA OF BOTH LUNGS DUE TO REGURGITATED FOOD, UNSPECIFIED PART OF LUNG (MULTI): Primary | ICD-10-CM

## 2023-10-30 PROCEDURE — 2550000001 HC RX 255 CONTRASTS: Performed by: INTERNAL MEDICINE

## 2023-10-30 PROCEDURE — 71250 CT THORAX DX C-: CPT

## 2023-10-30 PROCEDURE — 71260 CT THORAX DX C+: CPT | Mod: ME

## 2023-10-30 RX ADMIN — IOHEXOL 50 ML: 350 INJECTION, SOLUTION INTRAVENOUS at 10:19

## 2023-11-01 ENCOUNTER — OFFICE VISIT (OUTPATIENT)
Dept: HEMATOLOGY/ONCOLOGY | Facility: CLINIC | Age: 73
End: 2023-11-01
Payer: MEDICARE

## 2023-11-01 VITALS
OXYGEN SATURATION: 97 % | WEIGHT: 160.94 LBS | BODY MASS INDEX: 31.43 KG/M2 | TEMPERATURE: 98.4 F | SYSTOLIC BLOOD PRESSURE: 125 MMHG | DIASTOLIC BLOOD PRESSURE: 88 MMHG | RESPIRATION RATE: 18 BRPM | HEART RATE: 68 BPM

## 2023-11-01 DIAGNOSIS — J69.0 ASPIRATION PNEUMONIA OF BOTH LUNGS DUE TO REGURGITATED FOOD, UNSPECIFIED PART OF LUNG (MULTI): ICD-10-CM

## 2023-11-01 DIAGNOSIS — C34.90 MALIGNANT NEOPLASM OF BRONCHUS AND LUNG (MULTI): ICD-10-CM

## 2023-11-01 PROCEDURE — 1159F MED LIST DOCD IN RCRD: CPT | Performed by: INTERNAL MEDICINE

## 2023-11-01 PROCEDURE — 99213 OFFICE O/P EST LOW 20 MIN: CPT | Performed by: INTERNAL MEDICINE

## 2023-11-01 PROCEDURE — 1126F AMNT PAIN NOTED NONE PRSNT: CPT | Performed by: INTERNAL MEDICINE

## 2023-11-01 PROCEDURE — 1160F RVW MEDS BY RX/DR IN RCRD: CPT | Performed by: INTERNAL MEDICINE

## 2023-11-01 PROCEDURE — 1036F TOBACCO NON-USER: CPT | Performed by: INTERNAL MEDICINE

## 2023-11-01 ASSESSMENT — PAIN SCALES - GENERAL: PAINLEVEL: 0-NO PAIN

## 2023-11-01 NOTE — PROGRESS NOTES
Patient ID: Sylwia Greenberg is a 73 y.o. female    Primary Care Provider: Gloria Mitchell DO    DIAGNOSIS AND STAGING  Stage IIIA ([id number]) NSCLC (squamous cell carcinoma) of the RLL -   S/p RLL lobectomy on 07/16/20 - intraoperatively the tumor was found to be abutting the right atrium (final pathology showed microscopic focus of invasion of pulmonary vein)      SITES OF DISEASE  RLL     MOLECULAR GENOMICS  MICROSATELLITE STATUS: Microsatellite Stable (JESSICA)     DISEASE ASSOCIATED GENOMIC FINDINGS:  TP53 p.E204* ([id number]: c.610G>T)  TP53 p.P027Zcf*51 ([id number]: c.[id number])     DISEASE RELEVANT ALTERATIONS NOT DETECTED:  Negative for ALK fusion.  Negative for BRAF V600E.  Negative for EGFR (sensitizing) mutation.  Negative for NTRK fusion.  Negative for ROS1 fusion.  Negative for RET fusion.  Negative for MET exon 14 skipping mutation.       PRIOR THERAPIES  4 cycles of adjuvant cisplatin/docetaxel from 10/30/2020 through 12/30/2020.     CURRENT THERAPY  Surveillance    CURRENT ONCOLOGICAL PROBLEMS  None     HISTORY OF PRESENT ILLNESS  smoker, who was found to have on a low dose screening CT a RLL mass. She was completely asymptomatic. Denies weight loss or fatigue  or respiratory sx.       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 Intra-operatively the tumor was found to be abutting the right  atrium -   pT4N0-   Tumor is TPS 35%      Meets with  "medical oncology on 08/12/20:   After her surgery she developed PNS that led to a readmission less than 24 hours after discharge - Her last Levaquin dose was 2 days ago   She feels now very weak   Appetite is recovering -   She is using O2 (93-96%)  Endorses insomnia - using melatonin 5 mg to no avail     08/14/20: MRI brain shows no intracranial metastasis      08/16/20: admitted to [facility] with atrial tachycardia vs atypical flutter secondary to post-operative pericarditis. On amiodarone and colchicine (for 3 months) - following with cardiology.      10/30/20: C1D1 cisplatin/docetaxel   11/18/20: C2D1 cisplatin/docetaxel   12/09/20: C3D1 cisplatin/docetaxel   12/30/20: C4D1 cisplatin/docetaxel      PAST MEDICAL HISTORY  COPD  Gout  Osteopenia  Chemotherapy-induced peripheral neuropathy/chest wall pain related to prior to VATS procedure  Hypothyroidism  Insomnia  Hyperlipidemia  Hiatal hernia   GERD     SOCIAL HISTORY  She used to work as a  ([employer])-   She is  and lives alone  Has 3 children -   There is no occupational history     FAMILY HISTORY  Noncontributory    CURRENT MEDS REVIEWED       ALLERGIES  NKDA     SUBJECTIVE:  Continues to have some paroxysmal episodes of cough  Does not feel she is aspirating  Cough seems to come from \"nowhere\"  She has been using antibiotics as prescribed by Dr. Field, specifically amoxicillin/clavulanate  No fevers or chills  Stamina is preserved  No new localized pain    A 13 point review of systems was performed, with significant findings documented above in subjective history.    OBJECTIVE:  Vitals:    11/01/23 1637   BP: 125/88   Pulse: 68   Resp: 18   Temp: 36.9 °C (98.4 °F)   SpO2: 97%      Body surface area is 1.76 meters squared.     Physical Exam  Constitutional:       Appearance: Normal appearance.   HENT:      Head: Atraumatic.   Eyes:      Extraocular Movements: Extraocular movements intact.      Conjunctiva/sclera: Conjunctivae " normal.   Cardiovascular:      Rate and Rhythm: Normal rate.   Pulmonary:      Effort: Pulmonary effort is normal.      Breath sounds: Rhonchi present.      Comments: Very decreased BS throughout both lung fields   Abdominal:      Palpations: Abdomen is soft.      Tenderness: There is no abdominal tenderness. There is no guarding.   Skin:     General: Skin is warm and dry.      Findings: No rash.   Neurological:      General: No focal deficit present.      Mental Status: She is alert and oriented to person, place, and time.      Motor: No weakness.      Gait: Gait normal.   Psychiatric:         Mood and Affect: Mood normal.         Behavior: Behavior normal.              Diagnostic Results   Results:  Labs:  Lab Results   Component Value Date    WBC 6.4 10/05/2023    HGB 12.5 10/05/2023    HCT 38.4 10/05/2023    MCV 94 10/05/2023     10/05/2023      Lab Results   Component Value Date    NEUTROABS 6.87 (H) 10/04/2023      Lab Results   Component Value Date    GLUCOSE 134 (H) 10/05/2023    CALCIUM 9.6 10/05/2023     10/05/2023    K 3.8 10/05/2023    CO2 24 10/05/2023     10/05/2023    BUN 17 10/05/2023    CREATININE 0.83 10/05/2023    MG 2.06 05/15/2023     Lab Results   Component Value Date    ALT 23 10/04/2023    AST 33 10/04/2023    ALKPHOS 92 10/04/2023    BILITOT 0.5 10/04/2023      Lab Results   Component Value Date    TSH 21.78 (H) 06/26/2023    FREET4 1.14 06/26/2023     STUDY:  CT CHEST W IV CONTRAST;  10/30/2023 10:26 am      INDICATION:  Signs/Symptoms:Follow -up possible aspiration pneumonia after  antibiotics in a pt with history of lung CA.      COMPARISON:  10/04/2023 and 05/15/2023      ACCESSION NUMBER(S):  [id number]      ORDERING CLINICIAN:  LUCIANA BOLIVAR      TECHNIQUE:  Helical data acquisition of the chest was obtained  without IV  contrast material.  Images were reformatted in axial, coronal, and  sagittal planes.      FINDINGS:  LUNGS AND AIRWAYS:  The trachea and central  airways are patent. No endobronchial lesion.      The area of consolidation posterolaterally in the right lower lobe on  the prior study is decreased. However in the more posterior basal  portion of the right lower lobe there is new consolidation measuring  about 1.6 x 0.9 cm (image 186 of 319) and at the most inferior  subpleural aspect of the right lower lobe there is a 5 mm  solid-appearing nodular density (image 197 of 319) which is new. A  previous right middle lobe nodular opacity has resolved. Infiltrate  in the lingula is partially improved. A few tree-in-bud densities are  noted in left upper lobe and left lower lobe, which are less than the  opacities in the same region on the prior study. The overall picture  is consistent with generally improving pneumonia. A caveatis new  findings in the right lower lobe which could represent interval area  of pneumonia. No pleural effusion is present. Pleural thickening of  the right thoracic base is unchanged.      MEDIASTINUM AND BOOGIE, LOWER NECK AND AXILLA:  Thyroid gland is very small.      Visible mediastinal lymph nodes appear unchanged with short axis up  to 1 cm.      Esophagus is fluid-filled in the distal 2/3 and appears relatively  capacious, but unchanged. No definite wall thickening is noted. A  small hiatal hernia is present.      HEART AND VESSELS:  The thoracic aorta is of normal course and caliber with patchy  vascular calcifications.      Main pulmonary artery and its branches are normal in caliber.      Diffuse coronary artery calcifications are seen. The study is not  optimized for evaluation of coronary arteries.      The cardiac chambers are not enlarged.      No evidence of pericardial effusion.      UPPER ABDOMEN:  The visualized subdiaphragmatic structures demonstrate no remarkable  findings.      CHEST WALL AND OSSEOUS STRUCTURES:  There are no suspicious osseous lesions. Multilevel degenerative  changes are present      IMPRESSION:  1.  Mixed  pattern of infiltrate improvement and new consolidation in  the right lower lobe is still most likely representative of  pneumonia. Infiltrates on the left show improvement since the prior  study but are not resolved. Continued surveillance follow-up is  recommended, with repeat CT in 2-3 months.  2. Fluid is noted in the distal 2/3 of the esophagus in the esophagus  is relatively capacious. There is also a small hiatal hernia. From  this imaging, lack of clearance of the esophagus can not be  differentiated from reflux. In either instance, there is probably  dysfunction of the lower esophageal sphincter.          Assessment/Plan   Stage IIIA non-small cell lung cancer (squamous cell carcinoma) of the right lower lobe, status post resection followed by adjuvant systemic chemotherapy as above.  Has been on surveillance ever since.    Now presenting with new respiratory symptoms, mostly cough and dyspnea and recent CT findings consistent with aspiration pneumonia.  Her most recent imaging supports this diagnosis.  Explained to the patient that I will leave the treatment of this recurrent complication after Dr. Field, her pulmonologist.  I also urged her to contact the GI to discuss potential measures to mitigate recurrent aspiration.    I can see her in 3 months from now with repeated scans, unless new scans are performed in the meantime, for which we can readjust her follow-up.      This note was created with the assistance of a speech recognition program.  Although the intention is to generate a document that actually reflects the content of the visit, it is possible that some mistakes occur and may not be corrected by the time of completion of this note.        Lilli Iglesias MD, MS  Thoracic Medical Oncology   [address] [place], [place] [address]  Phone: [phone]

## 2023-11-02 ENCOUNTER — TELEPHONE (OUTPATIENT)
Dept: PULMONOLOGY | Facility: CLINIC | Age: 73
End: 2023-11-02
Payer: MEDICARE

## 2023-11-02 DIAGNOSIS — J69.0 ASPIRATION PNEUMONIA OF BOTH LUNGS DUE TO REGURGITATED FOOD, UNSPECIFIED PART OF LUNG (MULTI): ICD-10-CM

## 2023-11-02 DIAGNOSIS — K22.9 ESOPHAGEAL DISORDER: Primary | ICD-10-CM

## 2023-11-02 DIAGNOSIS — J69.0 ASPIRATION PNEUMONIA OF BOTH LUNGS DUE TO VOMIT, UNSPECIFIED PART OF LUNG (MULTI): Primary | ICD-10-CM

## 2023-11-02 RX ORDER — AMOXICILLIN AND CLAVULANATE POTASSIUM 875; 125 MG/1; MG/1
875 TABLET, FILM COATED ORAL 2 TIMES DAILY
Qty: 14 TABLET | Refills: 0 | Status: SHIPPED | OUTPATIENT
Start: 2023-11-02 | End: 2023-11-09

## 2023-11-02 NOTE — TELEPHONE ENCOUNTER
Patient recently had a repeat Ct scan that she would like for you to review. Patient sts it shows that she still has aspiration pneumonia that has gotten worse.    Please review the results and call the patient back. Dr. Iglesias was also to send you an email, per the patient.    Patient sts Dr. Iglesias thinks it is stemming from a hiatal hernia and wants the patient o see gastro. However the patient would like to speak with you first and possibly clear up the infection.     Please call the patient to advise

## 2023-11-02 NOTE — PROGRESS NOTES
Patient with follow-up CT scan revealing mixed changes of improvement with other areas of pneumonia.  There is fluid in the esophagus and I am worried about aspiration.  I am going to refer her to GI.

## 2023-11-09 ENCOUNTER — APPOINTMENT (OUTPATIENT)
Dept: RADIOLOGY | Facility: CLINIC | Age: 73
End: 2023-11-09
Payer: MEDICARE

## 2023-11-15 ENCOUNTER — HOSPITAL ENCOUNTER (OUTPATIENT)
Dept: RADIOLOGY | Facility: HOSPITAL | Age: 73
Discharge: HOME | End: 2023-11-15
Payer: MEDICARE

## 2023-11-15 DIAGNOSIS — J69.0 ASPIRATION PNEUMONIA OF BOTH LUNGS DUE TO VOMIT, UNSPECIFIED PART OF LUNG (MULTI): ICD-10-CM

## 2023-11-15 PROCEDURE — 3430000001 HC RX 343 DIAGNOSTIC RADIOPHARMACEUTICALS: Performed by: INTERNAL MEDICINE

## 2023-11-15 PROCEDURE — 74220 X-RAY XM ESOPHAGUS 1CNTRST: CPT | Performed by: STUDENT IN AN ORGANIZED HEALTH CARE EDUCATION/TRAINING PROGRAM

## 2023-11-15 PROCEDURE — A9698 NON-RAD CONTRAST MATERIALNOC: HCPCS | Performed by: INTERNAL MEDICINE

## 2023-11-15 PROCEDURE — 74220 X-RAY XM ESOPHAGUS 1CNTRST: CPT

## 2023-11-15 PROCEDURE — 2500000001 HC RX 250 WO HCPCS SELF ADMINISTERED DRUGS (ALT 637 FOR MEDICARE OP): Performed by: INTERNAL MEDICINE

## 2023-11-15 RX ADMIN — BARIUM SULFATE 30 ML: 980 POWDER, FOR SUSPENSION ORAL at 09:14

## 2023-11-15 RX ADMIN — ANTACID/ANTIFLATULENT 1 PACKET: 380; 550; 10; 10 GRANULE, EFFERVESCENT ORAL at 09:17

## 2023-11-15 RX ADMIN — BARIUM SULFATE 100 ML: 960 POWDER, FOR SUSPENSION ORAL at 09:16

## 2023-11-20 ENCOUNTER — TELEPHONE (OUTPATIENT)
Dept: PULMONOLOGY | Facility: CLINIC | Age: 73
End: 2023-11-20
Payer: MEDICARE

## 2023-11-21 ENCOUNTER — OFFICE VISIT (OUTPATIENT)
Dept: GASTROENTEROLOGY | Facility: CLINIC | Age: 73
End: 2023-11-21
Payer: MEDICARE

## 2023-11-21 ENCOUNTER — APPOINTMENT (OUTPATIENT)
Dept: GASTROENTEROLOGY | Facility: CLINIC | Age: 73
End: 2023-11-21
Payer: MEDICARE

## 2023-11-21 VITALS
TEMPERATURE: 96.1 F | SYSTOLIC BLOOD PRESSURE: 146 MMHG | HEIGHT: 61 IN | HEART RATE: 79 BPM | BODY MASS INDEX: 30.25 KG/M2 | WEIGHT: 160.2 LBS | DIASTOLIC BLOOD PRESSURE: 84 MMHG

## 2023-11-21 DIAGNOSIS — K21.00 CHRONIC REFLUX ESOPHAGITIS: ICD-10-CM

## 2023-11-21 DIAGNOSIS — K21.9 GASTROESOPHAGEAL REFLUX DISEASE WITH HIATAL HERNIA: ICD-10-CM

## 2023-11-21 DIAGNOSIS — R13.19 ESOPHAGEAL DYSPHAGIA: Primary | ICD-10-CM

## 2023-11-21 DIAGNOSIS — C34.31 MALIGNANT NEOPLASM OF LOWER LOBE, RIGHT BRONCHUS OR LUNG (MULTI): ICD-10-CM

## 2023-11-21 DIAGNOSIS — K44.9 GASTROESOPHAGEAL REFLUX DISEASE WITH HIATAL HERNIA: ICD-10-CM

## 2023-11-21 PROCEDURE — 1036F TOBACCO NON-USER: CPT | Performed by: INTERNAL MEDICINE

## 2023-11-21 PROCEDURE — 99214 OFFICE O/P EST MOD 30 MIN: CPT | Performed by: INTERNAL MEDICINE

## 2023-11-21 PROCEDURE — 1126F AMNT PAIN NOTED NONE PRSNT: CPT | Performed by: INTERNAL MEDICINE

## 2023-11-21 PROCEDURE — 1160F RVW MEDS BY RX/DR IN RCRD: CPT | Performed by: INTERNAL MEDICINE

## 2023-11-21 PROCEDURE — 1159F MED LIST DOCD IN RCRD: CPT | Performed by: INTERNAL MEDICINE

## 2023-11-21 RX ORDER — OMEPRAZOLE 40 MG/1
40 CAPSULE, DELAYED RELEASE ORAL DAILY
Qty: 90 CAPSULE | Refills: 3 | Status: SHIPPED | OUTPATIENT
Start: 2023-11-21 | End: 2024-02-29 | Stop reason: SDUPTHER

## 2023-11-21 ASSESSMENT — PAIN SCALES - GENERAL: PAINLEVEL: 0-NO PAIN

## 2023-11-21 NOTE — PATIENT INSTRUCTIONS
"Your upper endoscopy and esophageal motility study show us that your esophagus is not working in terms of motility or contractions.  The barium swallow looks like a funnel.  This is commonly called a \"JUNK ESOPHAGUS\"  I think this can come from vagal nerve injury from surgery or chemotherapy.      Stay on the omeprazole 40 mg before breakfast indefinitely.    "

## 2023-11-21 NOTE — PROGRESS NOTES
"Subjective   Patient ID: Breonna Greenberg \"Sylwia\" is a 73 y.o. female who presents for Dysphagia.  Referred by Dr Feild for esophageal dysfunction    She has problems with both liquids and solids.  Some sensation of sticking in the throat.    Symptoms probably started about 1 year ago when she was eating Italian wedding soup but in retrospect, she recalls some esophageal symptoms being mentioned to Dr. Moreno - felt cough drops and hot water helped   Pneumonia diagnosed later on    EGD and EMOT earlier this year and reviewed - aperistalsis and open EGJ  CT lungs reviewed  Barium esophagram reviewed   Cardiology and oncology are on board     No radiation  Surgery on right lower lobe of lung 2020  Chemotherapy afterwards         Review of Systems    Objective   Physical Exam  Constitutional:       Appearance: Normal appearance.   Pulmonary:      Effort: Pulmonary effort is normal.   Abdominal:      Palpations: Abdomen is soft.   Skin:     General: Skin is warm and dry.      Comments: No signs of scleroderma    Neurological:      Mental Status: She is alert.   Psychiatric:         Mood and Affect: Mood normal.         Behavior: Behavior normal.         Judgment: Judgment normal.         Assessment/Plan   Problem List Items Addressed This Visit             ICD-10-CM    Gastroesophageal reflux disease with hiatal hernia K21.9, K44.9    Esophageal dysphagia - Primary R13.19    Relevant Medications    omeprazole (PriLOSEC) 40 mg DR capsule    Chronic reflux esophagitis K21.00     Other Visit Diagnoses         Codes    Malignant neoplasm of lower lobe, right bronchus or lung (CMS/HCC)     C34.31    Relevant Orders    Comprehensive Metabolic Panel    CBC and Auto Differential    Creatinine    Magnesium               "

## 2023-11-24 ENCOUNTER — APPOINTMENT (OUTPATIENT)
Dept: RADIOLOGY | Facility: CLINIC | Age: 73
End: 2023-11-24
Payer: MEDICARE

## 2023-11-27 ENCOUNTER — TELEPHONE (OUTPATIENT)
Dept: PULMONOLOGY | Facility: CLINIC | Age: 73
End: 2023-11-27
Payer: MEDICARE

## 2023-11-27 ENCOUNTER — DOCUMENTATION (OUTPATIENT)
Dept: PULMONOLOGY | Facility: HOSPITAL | Age: 73
End: 2023-11-27
Payer: MEDICARE

## 2023-11-27 DIAGNOSIS — R05.3 CHRONIC COUGH: Primary | ICD-10-CM

## 2023-11-27 NOTE — TELEPHONE ENCOUNTER
Patient sts she would like a call back to discuss how she is feeling and the appointment that she had with the gastro surgeon. Please call

## 2023-11-27 NOTE — PROGRESS NOTES
Coughed up some blood today and will get a chest x-ray; hopefully she doesn't have aspiration of pneumonia again, which is possible with her achalasia

## 2023-11-28 ENCOUNTER — ANCILLARY PROCEDURE (OUTPATIENT)
Dept: RADIOLOGY | Facility: CLINIC | Age: 73
End: 2023-11-28
Payer: MEDICARE

## 2023-11-28 DIAGNOSIS — R05.3 CHRONIC COUGH: ICD-10-CM

## 2023-11-28 PROCEDURE — 71046 X-RAY EXAM CHEST 2 VIEWS: CPT | Performed by: RADIOLOGY

## 2023-11-28 PROCEDURE — 71046 X-RAY EXAM CHEST 2 VIEWS: CPT

## 2023-11-29 ENCOUNTER — DOCUMENTATION (OUTPATIENT)
Dept: PULMONOLOGY | Facility: HOSPITAL | Age: 73
End: 2023-11-29
Payer: MEDICARE

## 2023-11-29 ENCOUNTER — APPOINTMENT (OUTPATIENT)
Dept: HEMATOLOGY/ONCOLOGY | Facility: CLINIC | Age: 73
End: 2023-11-29
Payer: MEDICARE

## 2023-11-29 DIAGNOSIS — J69.0: Primary | ICD-10-CM

## 2023-11-29 NOTE — PROGRESS NOTES
X-ray does not reveal any definite pneumonia.  With her aspiration pneumonia in the past we will obtain a follow-up in 1 month.  She is trying diet modification to try and prevent aspiration.  She did see Dr. Mora

## 2023-11-30 ENCOUNTER — LAB (OUTPATIENT)
Dept: LAB | Facility: LAB | Age: 73
End: 2023-11-30
Payer: MEDICARE

## 2023-11-30 DIAGNOSIS — C34.31 MALIGNANT NEOPLASM OF LOWER LOBE, RIGHT BRONCHUS OR LUNG (MULTI): ICD-10-CM

## 2023-11-30 LAB
ALBUMIN SERPL BCP-MCNC: 4.5 G/DL (ref 3.4–5)
ALP SERPL-CCNC: 81 U/L (ref 33–136)
ALT SERPL W P-5'-P-CCNC: 14 U/L (ref 7–45)
ANION GAP SERPL CALC-SCNC: 16 MMOL/L (ref 10–20)
AST SERPL W P-5'-P-CCNC: 28 U/L (ref 9–39)
BASOPHILS # BLD AUTO: 0.05 X10*3/UL (ref 0–0.1)
BASOPHILS NFR BLD AUTO: 0.6 %
BILIRUB SERPL-MCNC: 0.6 MG/DL (ref 0–1.2)
BUN SERPL-MCNC: 16 MG/DL (ref 6–23)
CALCIUM SERPL-MCNC: 9.8 MG/DL (ref 8.6–10.6)
CHLORIDE SERPL-SCNC: 102 MMOL/L (ref 98–107)
CO2 SERPL-SCNC: 30 MMOL/L (ref 21–32)
CREAT SERPL-MCNC: 0.78 MG/DL (ref 0.5–1.05)
EOSINOPHIL # BLD AUTO: 0.31 X10*3/UL (ref 0–0.4)
EOSINOPHIL NFR BLD AUTO: 3.8 %
ERYTHROCYTE [DISTWIDTH] IN BLOOD BY AUTOMATED COUNT: 13.5 % (ref 11.5–14.5)
GFR SERPL CREATININE-BSD FRML MDRD: 80 ML/MIN/1.73M*2
GLUCOSE SERPL-MCNC: 94 MG/DL (ref 74–99)
HCT VFR BLD AUTO: 41.1 % (ref 36–46)
HGB BLD-MCNC: 13.2 G/DL (ref 12–16)
IMM GRANULOCYTES # BLD AUTO: 0.04 X10*3/UL (ref 0–0.5)
IMM GRANULOCYTES NFR BLD AUTO: 0.5 % (ref 0–0.9)
LYMPHOCYTES # BLD AUTO: 1.43 X10*3/UL (ref 0.8–3)
LYMPHOCYTES NFR BLD AUTO: 17.7 %
MAGNESIUM SERPL-MCNC: 1.9 MG/DL (ref 1.6–2.4)
MCH RBC QN AUTO: 31.1 PG (ref 26–34)
MCHC RBC AUTO-ENTMCNC: 32.1 G/DL (ref 32–36)
MCV RBC AUTO: 97 FL (ref 80–100)
MONOCYTES # BLD AUTO: 0.64 X10*3/UL (ref 0.05–0.8)
MONOCYTES NFR BLD AUTO: 7.9 %
NEUTROPHILS # BLD AUTO: 5.63 X10*3/UL (ref 1.6–5.5)
NEUTROPHILS NFR BLD AUTO: 69.5 %
NRBC BLD-RTO: 0 /100 WBCS (ref 0–0)
PLATELET # BLD AUTO: 187 X10*3/UL (ref 150–450)
POTASSIUM SERPL-SCNC: 3.7 MMOL/L (ref 3.5–5.3)
PROT SERPL-MCNC: 7.2 G/DL (ref 6.4–8.2)
RBC # BLD AUTO: 4.24 X10*6/UL (ref 4–5.2)
SODIUM SERPL-SCNC: 144 MMOL/L (ref 136–145)
WBC # BLD AUTO: 8.1 X10*3/UL (ref 4.4–11.3)

## 2023-11-30 PROCEDURE — 36415 COLL VENOUS BLD VENIPUNCTURE: CPT

## 2023-11-30 PROCEDURE — 85025 COMPLETE CBC W/AUTO DIFF WBC: CPT

## 2023-11-30 PROCEDURE — 83735 ASSAY OF MAGNESIUM: CPT

## 2023-11-30 PROCEDURE — 80053 COMPREHEN METABOLIC PANEL: CPT

## 2023-12-13 LAB
HOLD SPECIMEN: NORMAL
HOLD SPECIMEN: NORMAL

## 2023-12-29 ENCOUNTER — HOSPITAL ENCOUNTER (OUTPATIENT)
Dept: CARDIOLOGY | Facility: CLINIC | Age: 73
Discharge: HOME | End: 2023-12-29
Payer: MEDICARE

## 2023-12-29 ENCOUNTER — APPOINTMENT (OUTPATIENT)
Dept: RADIOLOGY | Facility: CLINIC | Age: 73
End: 2023-12-29
Payer: MEDICARE

## 2023-12-29 DIAGNOSIS — R06.09 OTHER FORMS OF DYSPNEA: ICD-10-CM

## 2023-12-29 LAB
AORTIC VALVE PEAK VELOCITY: 1.43
AV PEAK GRADIENT: 8.2
AVA (PEAK VEL): 2.17
EJECTION FRACTION APICAL 4 CHAMBER: 76.6
LEFT ATRIUM VOLUME AREA LENGTH INDEX BSA: 19.1
LEFT VENTRICLE INTERNAL DIMENSION DIASTOLE: 3.98 (ref 3.5–6)
LEFT VENTRICULAR OUTFLOW TRACT DIAMETER: 1.92
MITRAL VALVE E/A RATIO: 0.92
MITRAL VALVE E/E' RATIO: 8.78
RIGHT VENTRICLE FREE WALL PEAK S': 11
RIGHT VENTRICLE PEAK SYSTOLIC PRESSURE: 35.1
TRICUSPID ANNULAR PLANE SYSTOLIC EXCURSION: 2.1

## 2023-12-29 PROCEDURE — 93306 TTE W/DOPPLER COMPLETE: CPT

## 2023-12-29 PROCEDURE — 93306 TTE W/DOPPLER COMPLETE: CPT | Performed by: INTERNAL MEDICINE

## 2024-01-02 ENCOUNTER — TELEPHONE (OUTPATIENT)
Dept: PULMONOLOGY | Facility: CLINIC | Age: 74
End: 2024-01-02
Payer: COMMERCIAL

## 2024-01-02 NOTE — TELEPHONE ENCOUNTER
Patient states she is scheduled for a CT on 1/4 ,also she is still having problems breathing on exertion.

## 2024-01-04 ENCOUNTER — ANCILLARY PROCEDURE (OUTPATIENT)
Dept: RADIOLOGY | Facility: CLINIC | Age: 74
End: 2024-01-04
Payer: COMMERCIAL

## 2024-01-04 DIAGNOSIS — J69.0: ICD-10-CM

## 2024-01-04 PROCEDURE — 71250 CT THORAX DX C-: CPT

## 2024-01-04 PROCEDURE — 71250 CT THORAX DX C-: CPT | Performed by: RADIOLOGY

## 2024-01-05 DIAGNOSIS — R06.02 SHORTNESS OF BREATH: Primary | ICD-10-CM

## 2024-01-05 NOTE — PROGRESS NOTES
CT scan demonstrates pretty much resolution of the pneumonia.  She has not knowingly aspirating at present.  She is complaining of shortness of breath.  It has been 3 years since I had last PFTs.  Will reorder another set.  Also will get a 6-minute walk.

## 2024-01-10 ENCOUNTER — APPOINTMENT (OUTPATIENT)
Dept: RESPIRATORY THERAPY | Facility: CLINIC | Age: 74
End: 2024-01-10
Payer: COMMERCIAL

## 2024-01-11 ENCOUNTER — APPOINTMENT (OUTPATIENT)
Dept: RESPIRATORY THERAPY | Facility: CLINIC | Age: 74
End: 2024-01-11
Payer: COMMERCIAL

## 2024-01-12 ENCOUNTER — ANCILLARY PROCEDURE (OUTPATIENT)
Dept: RADIOLOGY | Facility: CLINIC | Age: 74
End: 2024-01-12
Payer: COMMERCIAL

## 2024-01-12 DIAGNOSIS — M81.0 OSTEOPOROSIS, UNSPECIFIED OSTEOPOROSIS TYPE, UNSPECIFIED PATHOLOGICAL FRACTURE PRESENCE: ICD-10-CM

## 2024-01-12 PROCEDURE — 77080 DXA BONE DENSITY AXIAL: CPT | Performed by: RADIOLOGY

## 2024-01-12 PROCEDURE — 77080 DXA BONE DENSITY AXIAL: CPT

## 2024-01-22 ENCOUNTER — HOSPITAL ENCOUNTER (OUTPATIENT)
Dept: RESPIRATORY THERAPY | Facility: CLINIC | Age: 74
Discharge: HOME | End: 2024-01-22
Payer: COMMERCIAL

## 2024-01-22 DIAGNOSIS — R06.02 SHORTNESS OF BREATH: ICD-10-CM

## 2024-01-22 DIAGNOSIS — R32 URINARY INCONTINENCE, UNSPECIFIED TYPE: ICD-10-CM

## 2024-01-22 PROCEDURE — 94727 GAS DIL/WSHOT DETER LNG VOL: CPT | Performed by: INTERNAL MEDICINE

## 2024-01-22 PROCEDURE — 94060 EVALUATION OF WHEEZING: CPT

## 2024-01-22 PROCEDURE — 94727 GAS DIL/WSHOT DETER LNG VOL: CPT

## 2024-01-22 PROCEDURE — 94060 EVALUATION OF WHEEZING: CPT | Performed by: INTERNAL MEDICINE

## 2024-01-22 PROCEDURE — 94618 PULMONARY STRESS TESTING: CPT

## 2024-01-22 PROCEDURE — 94729 DIFFUSING CAPACITY: CPT

## 2024-01-23 DIAGNOSIS — J44.9 CHRONIC OBSTRUCTIVE PULMONARY DISEASE, UNSPECIFIED COPD TYPE (MULTI): Primary | ICD-10-CM

## 2024-01-23 RX ORDER — OXYBUTYNIN CHLORIDE 5 MG/1
5 TABLET ORAL DAILY
Qty: 100 TABLET | Refills: 2 | Status: SHIPPED | OUTPATIENT
Start: 2024-01-23 | End: 2024-03-01 | Stop reason: SDUPTHER

## 2024-01-23 NOTE — PROGRESS NOTES
PFTs reveal severe airflow obstruction with a severe reduction of DLCO.  I am going to refer to pulmonary rehab.  She is on Anoro there was no bronchodilator response.  She also needs recertification of her nighttime oxygen at 2 L/min.  A nocturnal pulse oximetry study will be ordered

## 2024-01-24 ENCOUNTER — TELEPHONE (OUTPATIENT)
Dept: CARDIAC REHAB | Facility: CLINIC | Age: 74
End: 2024-01-24
Payer: COMMERCIAL

## 2024-01-26 ENCOUNTER — TELEPHONE (OUTPATIENT)
Dept: CARDIAC REHAB | Facility: CLINIC | Age: 74
End: 2024-01-26
Payer: COMMERCIAL

## 2024-01-29 ENCOUNTER — TELEPHONE (OUTPATIENT)
Dept: CARDIAC REHAB | Facility: CLINIC | Age: 74
End: 2024-01-29
Payer: COMMERCIAL

## 2024-02-05 ENCOUNTER — TELEPHONE (OUTPATIENT)
Dept: ADMISSION | Facility: HOSPITAL | Age: 74
End: 2024-02-05
Payer: COMMERCIAL

## 2024-02-05 DIAGNOSIS — R91.8 LUNG MASS: ICD-10-CM

## 2024-02-05 NOTE — TELEPHONE ENCOUNTER
Left message on pt's identified voicemail that CT order was placed and provided phone number for radiology scheduling.  Reminded pt that labs need to be done prior and can't be done on same day as imaging scheduled.

## 2024-02-10 DIAGNOSIS — R00.2 PALPITATION: Primary | ICD-10-CM

## 2024-02-10 DIAGNOSIS — M10.9 GOUT, UNSPECIFIED CAUSE, UNSPECIFIED CHRONICITY, UNSPECIFIED SITE: ICD-10-CM

## 2024-02-12 RX ORDER — METOPROLOL SUCCINATE 50 MG/1
50 TABLET, EXTENDED RELEASE ORAL DAILY
Qty: 100 TABLET | Refills: 2 | Status: SHIPPED | OUTPATIENT
Start: 2024-02-12

## 2024-02-13 ENCOUNTER — TELEPHONE (OUTPATIENT)
Dept: PULMONOLOGY | Facility: CLINIC | Age: 74
End: 2024-02-13
Payer: COMMERCIAL

## 2024-02-13 RX ORDER — ALLOPURINOL 100 MG/1
TABLET ORAL
Qty: 100 TABLET | Refills: 2 | Status: SHIPPED | OUTPATIENT
Start: 2024-02-13 | End: 2024-02-29 | Stop reason: SDUPTHER

## 2024-02-13 NOTE — TELEPHONE ENCOUNTER
Patient need a new order for a Portable oxygen concentrator and sent to [facility]

## 2024-02-19 ENCOUNTER — DOCUMENTATION (OUTPATIENT)
Dept: PULMONOLOGY | Facility: HOSPITAL | Age: 74
End: 2024-02-19
Payer: COMMERCIAL

## 2024-02-19 DIAGNOSIS — E78.00 HYPERCHOLESTEROLEMIA: Primary | ICD-10-CM

## 2024-02-19 DIAGNOSIS — R00.2 PALPITATION: ICD-10-CM

## 2024-02-19 RX ORDER — ROSUVASTATIN CALCIUM 20 MG/1
20 TABLET, COATED ORAL DAILY
Qty: 90 TABLET | Refills: 3 | Status: SHIPPED | OUTPATIENT
Start: 2024-02-19

## 2024-02-19 NOTE — PROGRESS NOTES
Pulse oximetry really outlined significant desaturation at night equal or less than 88% for 4 hours and 12 minutes.  She qualifies for home nocturnal oxygen ministration.

## 2024-02-23 ENCOUNTER — LAB (OUTPATIENT)
Dept: LAB | Facility: LAB | Age: 74
End: 2024-02-23
Payer: COMMERCIAL

## 2024-02-23 DIAGNOSIS — R91.8 LUNG MASS: ICD-10-CM

## 2024-02-23 DIAGNOSIS — C34.90 MALIGNANT NEOPLASM OF BRONCHUS AND LUNG (MULTI): Primary | ICD-10-CM

## 2024-02-23 LAB
ALBUMIN SERPL BCP-MCNC: 4.2 G/DL (ref 3.4–5)
ALP SERPL-CCNC: 93 U/L (ref 33–136)
ALT SERPL W P-5'-P-CCNC: 19 U/L (ref 7–45)
ANION GAP SERPL CALC-SCNC: 15 MMOL/L (ref 10–20)
AST SERPL W P-5'-P-CCNC: 27 U/L (ref 9–39)
BASOPHILS # BLD AUTO: 0.04 X10*3/UL (ref 0–0.1)
BASOPHILS NFR BLD AUTO: 0.6 %
BILIRUB SERPL-MCNC: 0.5 MG/DL (ref 0–1.2)
BUN SERPL-MCNC: 15 MG/DL (ref 6–23)
CALCIUM SERPL-MCNC: 9.5 MG/DL (ref 8.6–10.6)
CHLORIDE SERPL-SCNC: 101 MMOL/L (ref 98–107)
CO2 SERPL-SCNC: 30 MMOL/L (ref 21–32)
CREAT SERPL-MCNC: 0.84 MG/DL (ref 0.5–1.05)
EGFRCR SERPLBLD CKD-EPI 2021: 73 ML/MIN/1.73M*2
EOSINOPHIL # BLD AUTO: 0.34 X10*3/UL (ref 0–0.4)
EOSINOPHIL NFR BLD AUTO: 4.8 %
ERYTHROCYTE [DISTWIDTH] IN BLOOD BY AUTOMATED COUNT: 12.7 % (ref 11.5–14.5)
GLUCOSE SERPL-MCNC: 97 MG/DL (ref 74–99)
HCT VFR BLD AUTO: 41.5 % (ref 36–46)
HGB BLD-MCNC: 13 G/DL (ref 12–16)
IMM GRANULOCYTES # BLD AUTO: 0.04 X10*3/UL (ref 0–0.5)
IMM GRANULOCYTES NFR BLD AUTO: 0.6 % (ref 0–0.9)
LYMPHOCYTES # BLD AUTO: 1.3 X10*3/UL (ref 0.8–3)
LYMPHOCYTES NFR BLD AUTO: 18.3 %
MCH RBC QN AUTO: 30.2 PG (ref 26–34)
MCHC RBC AUTO-ENTMCNC: 31.3 G/DL (ref 32–36)
MCV RBC AUTO: 96 FL (ref 80–100)
MONOCYTES # BLD AUTO: 0.55 X10*3/UL (ref 0.05–0.8)
MONOCYTES NFR BLD AUTO: 7.7 %
NEUTROPHILS # BLD AUTO: 4.85 X10*3/UL (ref 1.6–5.5)
NEUTROPHILS NFR BLD AUTO: 68 %
NRBC BLD-RTO: 0 /100 WBCS (ref 0–0)
PLATELET # BLD AUTO: 178 X10*3/UL (ref 150–450)
POTASSIUM SERPL-SCNC: 3.6 MMOL/L (ref 3.5–5.3)
PROT SERPL-MCNC: 6.9 G/DL (ref 6.4–8.2)
RBC # BLD AUTO: 4.31 X10*6/UL (ref 4–5.2)
SODIUM SERPL-SCNC: 142 MMOL/L (ref 136–145)
WBC # BLD AUTO: 7.1 X10*3/UL (ref 4.4–11.3)

## 2024-02-23 PROCEDURE — 36415 COLL VENOUS BLD VENIPUNCTURE: CPT

## 2024-02-23 PROCEDURE — 80053 COMPREHEN METABOLIC PANEL: CPT

## 2024-02-23 PROCEDURE — 85025 COMPLETE CBC W/AUTO DIFF WBC: CPT

## 2024-02-26 ENCOUNTER — HOSPITAL ENCOUNTER (OUTPATIENT)
Dept: RADIOLOGY | Facility: CLINIC | Age: 74
Discharge: HOME | End: 2024-02-26
Payer: COMMERCIAL

## 2024-02-26 DIAGNOSIS — R91.8 LUNG MASS: ICD-10-CM

## 2024-02-26 PROCEDURE — 2550000001 HC RX 255 CONTRASTS: Performed by: INTERNAL MEDICINE

## 2024-02-26 PROCEDURE — 71260 CT THORAX DX C+: CPT

## 2024-02-26 PROCEDURE — 71260 CT THORAX DX C+: CPT | Performed by: RADIOLOGY

## 2024-02-26 RX ADMIN — IOHEXOL 50 ML: 350 INJECTION, SOLUTION INTRAVENOUS at 11:45

## 2024-02-27 NOTE — PROGRESS NOTES
Patient ID: Sylwia Greenberg is a 73 y.o. 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  Negative for BRAF V600E.  Negative for EGFR (sensitizing) mutation.  Negative for NTRK fusion.  Negative for ROS1 fusion.  Negative for RET fusion.  Negative for MET exon 14 skipping mutation.       PRIOR THERAPIES  4 cycles of adjuvant cisplatin/docetaxel from 10/30/2020 through 12/30/2020.     CURRENT THERAPY  Surveillance    CURRENT ONCOLOGICAL PROBLEMS  None     HISTORY OF PRESENT ILLNESS  smoker, who was found to have on a low dose screening CT a RLL mass. She was completely asymptomatic. Denies weight loss or fatigue  or respiratory sx.       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 Intra-operatively the tumor was found to be abutting the right  atrium -   pT4N0-   Tumor is TPS 35%      Meets with  medical oncology on 08/12/20:   After her surgery she developed PNS that led to a readmission less than 24 hours after discharge - Her last Levaquin dose was 2 days ago   She feels now very weak   Appetite is recovering -   She is using O2 (93-96%)  Endorses insomnia - using melatonin 5 mg to no avail     08/14/20: MRI brain shows no intracranial metastasis      08/16/20: admitted to [facility] with atrial tachycardia vs atypical flutter secondary to post-operative pericarditis. On amiodarone and colchicine (for 3 months) - following with cardiology.      10/30/20: C1D1 cisplatin/docetaxel   11/18/20: C2D1 cisplatin/docetaxel   12/09/20: C3D1 cisplatin/docetaxel   12/30/20: C4D1 cisplatin/docetaxel      PAST MEDICAL HISTORY  COPD  Gout  Osteopenia  Chemotherapy-induced peripheral neuropathy/chest wall pain related to prior to VATS procedure  Hypothyroidism  Insomnia  Hyperlipidemia  Hiatal hernia   GERD     SOCIAL HISTORY  She used to work as a  ([employer])-   She is  and lives alone  Has 3 children -   There is no occupational history     FAMILY HISTORY  Noncontributory    CURRENT MEDS REVIEWED       ALLERGIES  NKDA     SUBJECTIVE:  Doing well overall, with no new symptoms or concerns  Practicing cheer volleyball  Tries and states as active as possible  Concerned about the CT scan readings that she assessed yesterday, and has many questions regarding the findings    A 13 point review of systems was performed, with significant findings documented above in subjective history.    OBJECTIVE:  Vitals:    02/28/24 1106   BP: (!) 150/91   Pulse:    Resp:    Temp:    SpO2:       Body surface area is 1.78 meters squared.     Physical Exam  Constitutional:       Appearance: Normal appearance.   HENT:      Head: Atraumatic.   Eyes:      Extraocular Movements: Extraocular movements intact.      Conjunctiva/sclera: Conjunctivae normal.   Cardiovascular:      Rate and Rhythm: Normal rate.   Pulmonary:       Effort: Pulmonary effort is normal.      Breath sounds: Normal breath sounds.      Comments: Very decreased BS throughout both lung fields   Abdominal:      Palpations: Abdomen is soft.      Tenderness: There is no abdominal tenderness. There is no guarding.   Skin:     General: Skin is warm and dry.      Findings: No rash.   Neurological:      General: No focal deficit present.      Mental Status: She is alert and oriented to person, place, and time.      Motor: No weakness.      Gait: Gait normal.   Psychiatric:         Mood and Affect: Mood normal.         Behavior: Behavior normal.              Diagnostic Results   Results:  Labs:  Lab Results   Component Value Date    WBC 7.1 02/23/2024    HGB 13.0 02/23/2024    HCT 41.5 02/23/2024    MCV 96 02/23/2024     02/23/2024      Lab Results   Component Value Date    NEUTROABS 4.85 02/23/2024      Lab Results   Component Value Date    GLUCOSE 97 02/23/2024    CALCIUM 9.5 02/23/2024     02/23/2024    K 3.6 02/23/2024    CO2 30 02/23/2024     02/23/2024    BUN 15 02/23/2024    CREATININE 0.84 02/23/2024    MG 1.90 11/30/2023     Lab Results   Component Value Date    ALT 19 02/23/2024    AST 27 02/23/2024    ALKPHOS 93 02/23/2024    BILITOT 0.5 02/23/2024      Lab Results   Component Value Date    TSH 21.78 (H) 06/26/2023    FREET4 1.14 06/26/2023     STUDY:  CT CHEST W IV CONTRAST;  2/26/2024 11:36 am      INDICATION:  Signs/Symptoms:Lung Cancer Re-Staging.      COMPARISON:  None.      ACCESSION NUMBER(S):  [id number]      ORDERING CLINICIAN:  LUCIANA BOLIVAR      TECHNIQUE:  Serial axial CT images of the chest obtained 50 ml. Sagittal and  coronal reconstructions were generated.      FINDINGS:  VESSELS: Aorta, cava and main pulmonary arteries are unremarkable.      HEART:  Heart is normal size. There is coronary artery disease.      MEDIASTINUM AND BOOGIE: There is slight prominence of precarinal lymph  nodes. One measures 12 mm in width.      There is  a dilated fluid-filled esophagus.      LUNG, PLEURA, AND LARGE AIRWAYS: Patient is status post right lower  lobectomy.      There is patchiness at the right base that has improved since the  prior exam. There is stable patchiness in the left upper lobe with  loss of volume. There is bronchiectasis.      There are stable tiny nodular densities in the left lower lobe.      There is no discrete consolidation or pleural fluid.      CHEST WALL AND LOWER NECK: The thyroid is not obvious.      BONES: Patient is status post partial resection of the 6th right rib.      UPPER ABDOMEN: Unremarkable      COMPARISON TO THE PRIOR EXAM:  The patchy area at the right base has improved. Chest is otherwise  similar.      IMPRESSION:  Improved patchiness in the right lower lobe.      There is consolidation and collapse in the left upper lobe. This may  represent scarring.      Stable nodules in the left lower lobe.      Coronary artery disease.      Dilated fluid-filled esophagus.          Assessment/Plan   Stage IIIA non-small cell lung cancer (squamous cell carcinoma) of the right lower lobe, status post resection followed by adjuvant systemic chemotherapy as above.  Has been on surveillance ever since.  Most recent scans on 02/26/24 with no concerns for disease recurrence or new primary and I reassured her.  Repeat scans in 6 months from now, August 2024, with a follow-up then      Status post aspiration pneumonia last year, evaluated by both pulmonology and GI.  Has history of hiatal hernia.  She has been trying diet modification to prevent subsequent episodes    COPD  Follows with Dr. Field  Requiring oxygen nocturnally        This note was created with the assistance of a speech recognition program.  Although the intention is to generate a document that actually reflects the content of the visit, it is possible that some mistakes occur and may not be corrected by the time of completion of this note.        Lilli Iglesias  MD, MS  [facility] Oncology   [address] [id number]  Phone: [phone]

## 2024-02-28 ENCOUNTER — OFFICE VISIT (OUTPATIENT)
Dept: HEMATOLOGY/ONCOLOGY | Facility: CLINIC | Age: 74
End: 2024-02-28
Payer: COMMERCIAL

## 2024-02-28 VITALS
OXYGEN SATURATION: 93 % | TEMPERATURE: 98.1 F | SYSTOLIC BLOOD PRESSURE: 150 MMHG | WEIGHT: 162.7 LBS | HEART RATE: 73 BPM | RESPIRATION RATE: 18 BRPM | BODY MASS INDEX: 30.74 KG/M2 | DIASTOLIC BLOOD PRESSURE: 91 MMHG

## 2024-02-28 DIAGNOSIS — C34.31 MALIGNANT NEOPLASM OF LOWER LOBE OF RIGHT LUNG (MULTI): Primary | ICD-10-CM

## 2024-02-28 PROCEDURE — 1159F MED LIST DOCD IN RCRD: CPT | Performed by: INTERNAL MEDICINE

## 2024-02-28 PROCEDURE — 99215 OFFICE O/P EST HI 40 MIN: CPT | Performed by: INTERNAL MEDICINE

## 2024-02-28 PROCEDURE — 1126F AMNT PAIN NOTED NONE PRSNT: CPT | Performed by: INTERNAL MEDICINE

## 2024-02-28 PROCEDURE — 1036F TOBACCO NON-USER: CPT | Performed by: INTERNAL MEDICINE

## 2024-02-28 PROCEDURE — 1157F ADVNC CARE PLAN IN RCRD: CPT | Performed by: INTERNAL MEDICINE

## 2024-02-28 ASSESSMENT — ENCOUNTER SYMPTOMS
OCCASIONAL FEELINGS OF UNSTEADINESS: 0
LOSS OF SENSATION IN FEET: 0
DEPRESSION: 0

## 2024-02-28 ASSESSMENT — PAIN SCALES - GENERAL: PAINLEVEL: 0-NO PAIN

## 2024-02-29 ENCOUNTER — TELEPHONE (OUTPATIENT)
Dept: PULMONOLOGY | Facility: CLINIC | Age: 74
End: 2024-02-29

## 2024-02-29 ENCOUNTER — TELEPHONE (OUTPATIENT)
Dept: PRIMARY CARE | Facility: CLINIC | Age: 74
End: 2024-02-29

## 2024-02-29 ENCOUNTER — TELEMEDICINE (OUTPATIENT)
Dept: PRIMARY CARE | Facility: CLINIC | Age: 74
End: 2024-02-29
Payer: COMMERCIAL

## 2024-02-29 DIAGNOSIS — J69.0 ASPIRATION PNEUMONIA OF BOTH LUNGS DUE TO REGURGITATED FOOD, UNSPECIFIED PART OF LUNG (MULTI): ICD-10-CM

## 2024-02-29 DIAGNOSIS — J69.0 ASPIRATION PNEUMONIA OF BOTH LUNGS DUE TO REGURGITATED FOOD, UNSPECIFIED PART OF LUNG (MULTI): Primary | ICD-10-CM

## 2024-02-29 DIAGNOSIS — M10.9 GOUT, UNSPECIFIED CAUSE, UNSPECIFIED CHRONICITY, UNSPECIFIED SITE: ICD-10-CM

## 2024-02-29 DIAGNOSIS — Z91.199 NO-SHOW FOR APPOINTMENT: Primary | ICD-10-CM

## 2024-02-29 DIAGNOSIS — R13.19 ESOPHAGEAL DYSPHAGIA: ICD-10-CM

## 2024-02-29 PROCEDURE — 1157F ADVNC CARE PLAN IN RCRD: CPT | Performed by: INTERNAL MEDICINE

## 2024-02-29 PROCEDURE — 1036F TOBACCO NON-USER: CPT | Performed by: INTERNAL MEDICINE

## 2024-02-29 PROCEDURE — 1159F MED LIST DOCD IN RCRD: CPT | Performed by: INTERNAL MEDICINE

## 2024-02-29 PROCEDURE — 1126F AMNT PAIN NOTED NONE PRSNT: CPT | Performed by: INTERNAL MEDICINE

## 2024-02-29 RX ORDER — OMEPRAZOLE 40 MG/1
40 CAPSULE, DELAYED RELEASE ORAL DAILY
Qty: 90 CAPSULE | Refills: 3 | Status: SHIPPED | OUTPATIENT
Start: 2024-02-29 | End: 2024-03-01 | Stop reason: SDUPTHER

## 2024-02-29 RX ORDER — ALLOPURINOL 100 MG/1
100 TABLET ORAL DAILY
Qty: 100 TABLET | Refills: 0 | Status: SHIPPED | OUTPATIENT
Start: 2024-02-29 | End: 2024-03-01 | Stop reason: SDUPTHER

## 2024-02-29 RX ORDER — LEVOTHYROXINE SODIUM 125 UG/1
125 TABLET ORAL
OUTPATIENT
Start: 2024-02-29

## 2024-02-29 RX ORDER — AMOXICILLIN AND CLAVULANATE POTASSIUM 500; 125 MG/1; MG/1
1 TABLET, FILM COATED ORAL 2 TIMES DAILY
Qty: 20 TABLET | Refills: 1 | Status: SHIPPED | OUTPATIENT
Start: 2024-02-29 | End: 2024-03-01 | Stop reason: SDUPTHER

## 2024-02-29 NOTE — TELEPHONE ENCOUNTER
Patient would like the results from her CT scan.    Also would like to know how much oxygen she should be using which is 2

## 2024-02-29 NOTE — PROGRESS NOTES
Patient is CT scan is improved but she is coughing up some purulent phlegm..  Probably related to continued chronic aspiration considering her dilated esophagus.  Will place her on Augmentin.  She will see GI.

## 2024-03-01 ENCOUNTER — TELEMEDICINE (OUTPATIENT)
Dept: PRIMARY CARE | Facility: CLINIC | Age: 74
End: 2024-03-01
Payer: COMMERCIAL

## 2024-03-01 DIAGNOSIS — R32 URINARY INCONTINENCE, UNSPECIFIED TYPE: ICD-10-CM

## 2024-03-01 DIAGNOSIS — R13.19 ESOPHAGEAL DYSPHAGIA: ICD-10-CM

## 2024-03-01 DIAGNOSIS — M79.7 FIBROMYALGIA: ICD-10-CM

## 2024-03-01 DIAGNOSIS — M10.9 GOUT, UNSPECIFIED CAUSE, UNSPECIFIED CHRONICITY, UNSPECIFIED SITE: ICD-10-CM

## 2024-03-01 DIAGNOSIS — E03.9 HYPOTHYROIDISM, UNSPECIFIED TYPE: Primary | ICD-10-CM

## 2024-03-01 DIAGNOSIS — R00.2 PALPITATION: ICD-10-CM

## 2024-03-01 DIAGNOSIS — J69.0 ASPIRATION PNEUMONIA OF BOTH LUNGS DUE TO REGURGITATED FOOD, UNSPECIFIED PART OF LUNG (MULTI): ICD-10-CM

## 2024-03-01 PROCEDURE — 1036F TOBACCO NON-USER: CPT | Performed by: INTERNAL MEDICINE

## 2024-03-01 PROCEDURE — 1157F ADVNC CARE PLAN IN RCRD: CPT | Performed by: INTERNAL MEDICINE

## 2024-03-01 PROCEDURE — 1159F MED LIST DOCD IN RCRD: CPT | Performed by: INTERNAL MEDICINE

## 2024-03-01 PROCEDURE — 99442 PR PHYS/QHP TELEPHONE EVALUATION 11-20 MIN: CPT | Performed by: INTERNAL MEDICINE

## 2024-03-01 PROCEDURE — 1126F AMNT PAIN NOTED NONE PRSNT: CPT | Performed by: INTERNAL MEDICINE

## 2024-03-01 RX ORDER — DULOXETIN HYDROCHLORIDE 30 MG/1
30 CAPSULE, DELAYED RELEASE ORAL DAILY
Qty: 90 CAPSULE | Refills: 0 | Status: SHIPPED | OUTPATIENT
Start: 2024-03-01 | End: 2024-05-06

## 2024-03-01 RX ORDER — GABAPENTIN 300 MG/1
300 CAPSULE ORAL 2 TIMES DAILY
Qty: 60 CAPSULE | Refills: 0 | Status: SHIPPED | OUTPATIENT
Start: 2024-03-01 | End: 2024-05-06

## 2024-03-01 RX ORDER — AMOXICILLIN AND CLAVULANATE POTASSIUM 500; 125 MG/1; MG/1
1 TABLET, FILM COATED ORAL 2 TIMES DAILY
Qty: 20 TABLET | Refills: 1 | Status: SHIPPED | OUTPATIENT
Start: 2024-03-01 | End: 2024-03-12 | Stop reason: WASHOUT

## 2024-03-01 RX ORDER — LEVOTHYROXINE SODIUM 125 UG/1
125 TABLET ORAL
Qty: 90 TABLET | Refills: 0 | Status: SHIPPED | OUTPATIENT
Start: 2024-03-01 | End: 2024-03-12 | Stop reason: DRUGHIGH

## 2024-03-01 RX ORDER — OXYBUTYNIN CHLORIDE 5 MG/1
5 TABLET ORAL DAILY
Qty: 90 TABLET | Refills: 0 | Status: SHIPPED | OUTPATIENT
Start: 2024-03-01

## 2024-03-01 RX ORDER — ALLOPURINOL 100 MG/1
100 TABLET ORAL DAILY
Qty: 90 TABLET | Refills: 0 | Status: SHIPPED | OUTPATIENT
Start: 2024-03-01 | End: 2024-06-09

## 2024-03-01 RX ORDER — OMEPRAZOLE 40 MG/1
40 CAPSULE, DELAYED RELEASE ORAL DAILY
Qty: 90 CAPSULE | Refills: 0 | Status: SHIPPED | OUTPATIENT
Start: 2024-03-01 | End: 2025-03-01

## 2024-03-01 ASSESSMENT — ENCOUNTER SYMPTOMS
HEADACHES: 0
DIZZINESS: 0
CONSTIPATION: 0
NECK PAIN: 0
FATIGUE: 0
BLOOD IN STOOL: 0
PALPITATIONS: 0
MYALGIAS: 0
SHORTNESS OF BREATH: 0
FEVER: 0
CHILLS: 0
ARTHRALGIAS: 0
DIARRHEA: 0
ABDOMINAL PAIN: 0
BACK PAIN: 0
COUGH: 0

## 2024-03-01 NOTE — PROGRESS NOTES
Subjective   Patient ID: Sylwia Greenberg is a 73 y.o. female who presents for No chief complaint on file..    HPI patient is scheduled for televisit today.  Her medical history is significant for hypertension, hypothyroidism, hyperlipidemia, fibromyalgia, neuropathy, history of lung cancer s/p right lobectomy.  She would like to get refills on her medications.  She feels well today.  She has appointment with her PCP Dr. Mitchell next month    Review of Systems   Constitutional:  Negative for chills, fatigue and fever.   Respiratory:  Negative for cough and shortness of breath.    Cardiovascular:  Negative for chest pain, palpitations and leg swelling.   Gastrointestinal:  Negative for abdominal pain, blood in stool, constipation and diarrhea.   Endocrine: Negative for cold intolerance and heat intolerance.   Musculoskeletal:  Negative for arthralgias, back pain, myalgias and neck pain.   Neurological:  Negative for dizziness and headaches.       Objective   There were no vitals taken for this visit.    Physical Exam  Constitutional:       Appearance: Normal appearance.   Pulmonary:      Breath sounds: Normal breath sounds.   Neurological:      Mental Status: She is alert.   Psychiatric:         Mood and Affect: Mood normal.         Behavior: Behavior normal.         Thought Content: Thought content normal.         Assessment/Plan   Diagnoses and all orders for this visit:  Hypothyroidism, unspecified type  Comments:  Continue with levothyroxine  Check TSH-last TSH 21  Fibromyalgia  Comments:  Continue with Cymbalta and gabapentin  Urinary incontinence, unspecified type  Comments:  Continue oxybutynin  Recent blood test-CBC CMP reviewed and discussed  Follow-up with PCP

## 2024-03-01 NOTE — TELEPHONE ENCOUNTER
We had increased her dose of levothyroxine to 150mcg. She was to have had rechecked her TSH in July.

## 2024-03-11 ENCOUNTER — LAB (OUTPATIENT)
Dept: LAB | Facility: LAB | Age: 74
End: 2024-03-11
Payer: COMMERCIAL

## 2024-03-11 DIAGNOSIS — E03.9 HYPOTHYROIDISM, UNSPECIFIED TYPE: ICD-10-CM

## 2024-03-11 LAB
T4 FREE SERPL-MCNC: 0.66 NG/DL (ref 0.78–1.48)
TSH SERPL-ACNC: 17.06 MIU/L (ref 0.44–3.98)

## 2024-03-11 PROCEDURE — 84443 ASSAY THYROID STIM HORMONE: CPT

## 2024-03-11 PROCEDURE — 84439 ASSAY OF FREE THYROXINE: CPT

## 2024-03-11 PROCEDURE — 36415 COLL VENOUS BLD VENIPUNCTURE: CPT

## 2024-03-12 ENCOUNTER — OFFICE VISIT (OUTPATIENT)
Dept: PRIMARY CARE | Facility: CLINIC | Age: 74
End: 2024-03-12
Payer: COMMERCIAL

## 2024-03-12 VITALS
SYSTOLIC BLOOD PRESSURE: 136 MMHG | HEART RATE: 66 BPM | OXYGEN SATURATION: 92 % | WEIGHT: 163 LBS | BODY MASS INDEX: 30.8 KG/M2 | TEMPERATURE: 96 F | DIASTOLIC BLOOD PRESSURE: 86 MMHG

## 2024-03-12 DIAGNOSIS — F33.9 RECURRENT MAJOR DEPRESSIVE DISORDER, REMISSION STATUS UNSPECIFIED (CMS-HCC): ICD-10-CM

## 2024-03-12 DIAGNOSIS — K22.4 ESOPHAGEAL DYSMOTILITY: ICD-10-CM

## 2024-03-12 DIAGNOSIS — I48.0 PAROXYSMAL ATRIAL FIBRILLATION (MULTI): ICD-10-CM

## 2024-03-12 DIAGNOSIS — M81.0 OSTEOPOROSIS, UNSPECIFIED OSTEOPOROSIS TYPE, UNSPECIFIED PATHOLOGICAL FRACTURE PRESENCE: ICD-10-CM

## 2024-03-12 DIAGNOSIS — M10.9 GOUT, UNSPECIFIED CAUSE, UNSPECIFIED CHRONICITY, UNSPECIFIED SITE: ICD-10-CM

## 2024-03-12 DIAGNOSIS — J44.9 CHRONIC OBSTRUCTIVE PULMONARY DISEASE, UNSPECIFIED COPD TYPE (MULTI): ICD-10-CM

## 2024-03-12 DIAGNOSIS — Z00.00 ENCOUNTER FOR PREVENTATIVE ADULT HEALTH CARE EXAMINATION: Primary | ICD-10-CM

## 2024-03-12 DIAGNOSIS — E78.5 HYPERLIPIDEMIA, UNSPECIFIED HYPERLIPIDEMIA TYPE: ICD-10-CM

## 2024-03-12 DIAGNOSIS — E03.9 HYPOTHYROIDISM, UNSPECIFIED TYPE: ICD-10-CM

## 2024-03-12 DIAGNOSIS — R12 HEARTBURN: ICD-10-CM

## 2024-03-12 PROCEDURE — 1159F MED LIST DOCD IN RCRD: CPT | Performed by: INTERNAL MEDICINE

## 2024-03-12 PROCEDURE — G2211 COMPLEX E/M VISIT ADD ON: HCPCS | Performed by: INTERNAL MEDICINE

## 2024-03-12 PROCEDURE — 1160F RVW MEDS BY RX/DR IN RCRD: CPT | Performed by: INTERNAL MEDICINE

## 2024-03-12 PROCEDURE — 1126F AMNT PAIN NOTED NONE PRSNT: CPT | Performed by: INTERNAL MEDICINE

## 2024-03-12 PROCEDURE — 99215 OFFICE O/P EST HI 40 MIN: CPT | Performed by: INTERNAL MEDICINE

## 2024-03-12 PROCEDURE — 1157F ADVNC CARE PLAN IN RCRD: CPT | Performed by: INTERNAL MEDICINE

## 2024-03-12 PROCEDURE — 1036F TOBACCO NON-USER: CPT | Performed by: INTERNAL MEDICINE

## 2024-03-12 RX ORDER — LEVOTHYROXINE SODIUM 150 UG/1
150 TABLET ORAL DAILY
Qty: 90 TABLET | Refills: 0 | Status: SHIPPED | OUTPATIENT
Start: 2024-03-12 | End: 2025-03-12

## 2024-03-12 ASSESSMENT — PATIENT HEALTH QUESTIONNAIRE - PHQ9
SUM OF ALL RESPONSES TO PHQ9 QUESTIONS 1 AND 2: 0
2. FEELING DOWN, DEPRESSED OR HOPELESS: NOT AT ALL
1. LITTLE INTEREST OR PLEASURE IN DOING THINGS: NOT AT ALL

## 2024-03-12 ASSESSMENT — PAIN SCALES - GENERAL: PAINLEVEL: 0-NO PAIN

## 2024-03-12 NOTE — PROGRESS NOTES
"Subjective   Patient ID: Breonna Greenberg \"Sylwia\" is a 73 y.o. female who presents for Med Refill.  HPI  73-year-old female here for follow-up visit, last seen in May of last year.    5/23: mammo good   6/23: asked for repeat tfts to be checked. It was cancelled?   LDL up TSH still high will increase dose to 150 and recheck in 2 months.   7/23: signed ppw for pt   10/23: admit to obs unit for new mass   Enrolled in pharmacy program.   11/23: seen by GI   1/24: bone denstiy improved stop alendronate   Drug holiday repeat 2 years   Did not refill levothyroxien needs repeat tsh done.  Was refilled at 125mcg getting her bloodwork done in a week.   Tsh too high     She has had two episodes of presumed pneumonia treated with augmentin     PMHx:  - COPD advanced and dyspnea on exertion - followed by Dr. Bruno, on anoro ellipta, albuterol and nebulizers. Notes persistent dyspnea on exertion with prolonged walking, cardiology workup unremarkable, qualifies for nocturnal oxygen  - Lung cancer (NSCLC - SCC) s/p right lung resection in 2020 with negative findings of recurrence on surveillance imaging complicated by tumor abutting the right atrium, post op pneumonia and post operative pericarditis s/p 4 cycles of adjuvant cisplatin/docetaxel with G-CSF support completed 12/20, last seen by onc Dr. Iglesias last month recommended surveillance every 6 months due in August  - Chest wall pain and fibromyalgia - on gabapentin 100mg followed by pain management   - Paroxysmal Afib -  precipitated by surgery not recommended AC followed by cardiology follow-up Dr. Carroll, also follows with Dr. Ramos.   - Osteoporosis - last DEXA 2018 on alendronate started at that time.  - CAD -  LV tract outflow obstruction seen by cardiology in September recommended metoprolol followed by Dr. Sander Carroll   - HTN   - Hypothyroidism on levothyroxine 125mcg   - HLD on rosuvastatin   - Fibromyalgia, depression and anxiety followed by pain management on " cymbalta 30mg and gabapentin 600mg BID   - Insomnia on trazodone 50mg for sleep with limited success, has trouble falling and staying asleep, gets up and has a cup of coffee and snack, plays games and then back to bed. Working on retraining.   - Right knee crystal arthropathy seen by Dr. Cheng in September s/p steroid injection recommended continuation of allopurinol and voltaren, seen by Dr. Ospina thought related to OA recommended conservative measures. She was at the point where she was unable to ambulate. She continues to experience pain which hurts but its bearable.   - Hiatal hernia and GERD - followed by Dr. Livingston last EGD 6/29/22on omeprazole 40mg   -Esophageal dysphagia to solids and liquids seen by GI recommended manometry study, EGD showing retained esophageal fluid, and history of aspiration pneumonia. Last seen in November. Diffuse dilatation of distal esophagus and ?achalasia. Recommended smaller meals and slower.   - Urinary urge incontinence on oxybutynin 5mg with significant improvement in symptoms  -Iron deficiency without anemia recommended increased iron intake  - Gout - on allopurinol last flare many years ago.   Current Outpatient Medications   Medication Instructions    albuterol sulfate (Proair Digihaler) 90 mcg/actuation aero powdr breath act w/sensor inhaler 2 puffs, inhalation, Every 6 hours PRN    albuterol 2.5 mg, nebulization, Every 6 hours    alendronate (Fosamax) 70 mg tablet TAKE 1 TABLET BY MOUTH WEEKLY  WITH 8 OZ OF PLAIN WATER 30  MINUTES BEFORE FIRST FOOD, DRINK OR MEDS. STAY UPRIGHT FOR 30  MINS    allopurinol (ZYLOPRIM) 100 mg, oral, Daily, as directed    amoxicillin-pot clavulanate (Augmentin) 500-125 mg tablet 1 tablet, oral, 2 times daily    atorvastatin (LIPITOR) 40 mg, oral, Daily    b complex 0.4 mg tablet 1 tablet, oral, Daily    biotin 5 mg capsule 1 capsule, oral, Daily    calcium carbonate (Tums) 200 mg calcium chewable tablet oral    calcium citrate-vitamin D3  (Citracal+D) 315 mg-5 mcg (200 unit) tablet oral    cholecalciferol (Vitamin D-3) 125 MCG (5000 UT) capsule 1 capsule, oral, Daily    cholecalciferol (VITAMIN D-3) 2,000 Units, oral    DULoxetine (CYMBALTA) 30 mg, oral, Daily    famotidine (Pepcid) 20 mg tablet oral, As needed    fluticasone (Flonase) 50 mcg/actuation nasal spray 1 spray, Each Nostril, Daily, Shake gently. Before first use, prime pump. After use, clean tip and replace cap.    gabapentin (NEURONTIN) 300 mg, oral, 2 times daily    levothyroxine (SYNTHROID, LEVOXYL) 125 mcg, oral, Daily before breakfast    MAGNESIUM ORAL 500 mg, oral    melatonin 3 mg tablet 1 tablet, oral, Nightly PRN    metoprolol succinate XL (TOPROL-XL) 50 mg, oral, Daily    multivitamin tablet 1 tablet, oral, Daily    multivitamin with folic acid (One Daily Multivitamin) 400 mcg tablet 1 tablet, oral, Daily    omeprazole (PRILOSEC) 40 mg, oral, Daily, Do not crush or chew.    oxybutynin (DITROPAN) 5 mg, oral, Daily    potassium chloride ER (Micro-K) 8 mEq ER capsule 1 capsule, oral, 2 times daily    rosuvastatin (CRESTOR) 20 mg, oral, Daily    traZODone (DESYREL) 50 mg, oral, Nightly PRN    umeclidinium-vilanteroL (Anoro Ellipta) 62.5-25 mcg/actuation blister with device 1 puff, inhalation, Daily        Objective     /86   Pulse 66   Temp 35.6 °C (96 °F) (Temporal)   Wt 73.9 kg (163 lb)   SpO2 92%   BMI 30.80 kg/m²     Physical Exam    Assessment/Plan   Problem List Items Addressed This Visit    None

## 2024-03-12 NOTE — PATIENT INSTRUCTIONS
Sylwia,   Increase levothyroxine to 150mcg   Blood ordered in 6-8 weeks   Followup as scheduled in early April.

## 2024-03-12 NOTE — PROGRESS NOTES
"Subjective   Patient ID: Breonna Greenberg \"Sylwia\" is a 73 y.o. female who presents for Med Refill.  Med Refill      73-year-old female here for follow-up visit, last seen in May of last year.    In October she was admitted to [facility] out of concern for new lung mass with recommendation to consider a PET scan which was unable to be performed. She was subsequently seen by oncology who strongly suspected infectious etiology as opposed to malignancy and treated with augmentin for presumed aspiration pneumonia (due to severe esophageal motility issues) with improvement in symptoms and subsequent resolution of concerning imaging in January. She was again noted to have similar symptoms  She was evaluated by GI who recommended consideration for surgical management of severe peristalsis issue.     - Osteoporosis - last DEXA 2018 on alendronate started at that time. A repeat bone density in January noted improvement in symptoms recommended discontinuation for drug holiday, has not yet done so.   - Hypothyroidism on levothyroxine 125mcg. Dose was meant to have been uptitrated to 150mcg in July but was not done. Repeat TFTs performed recently again shows elevated TSH. She takes the levothyroxine concurrently with her PPI but polypharmacy prevents her ability to take each one individually.   -Esophageal dysphagia, hiatal hernia and GERD -  to solids and liquids seen by GI recommended manometry study, EGD showing retained esophageal fluid, and history of aspiration pneumonia. Last seen in November. Diffuse dilatation of distal esophagus and ?achalasia. Recommended smaller meals and slower foods. One regular adult meal may last 2 days for her. Despite this, she notes difficulty with losing weight.   A recent CT chest performed on 2/26 was notable for improved patchiness in the RLL, consolidation and collapse in the left lower lobe.  May represent narrowing.  Stable moderate in the left lower lobe.  Coronary artery disease.  Dilated " fluid-filled esophagus. She was recently retreated again with Augmentin.   Overall symptoms are stable.   PMHx:  - COPD advanced and dyspnea on exertion - followed by Dr. Field, on anoro ellipta, albuterol and nebulizers. Notes persistent dyspnea on exertion with prolonged walking, cardiology workup unremarkable, qualifies for nocturnal oxygen which she has been using for some time now.   - Lung cancer (NSCLC - SCC) s/p right lung resection in 2020 with negative findings of recurrence on surveillance imaging complicated by tumor abutting the right atrium, post op pneumonia and post operative pericarditis s/p 4 cycles of adjuvant cisplatin/docetaxel with G-CSF support completed 12/20, last seen by onc Dr. Iglesias last month recommended surveillance every 6 months due in August  - Paroxysmal Afib -  precipitated by surgery not recommended AC followed by cardiology follow-up Dr. Carroll, also follows with Dr. Ramos.   - CAD -  LV tract outflow obstruction followed by cardiology   - HTN    - HLD on rosuvastatin switched from atorvastatin.  - Fibromyalgia (and chest wall pain) , depression and anxiety followed by pain management on cymbalta 30mg and gabapentin 600mg BID   - Insomnia-  on trazodone, limited success.   - Right knee crystal arthropathy -  s/p steroid injection with Dr. Cheng  recommended continuation of allopurinol and voltaren, seen by Dr. Ospina thought related to OA   - Urinary urge incontinence on oxybutynin 5mg with significant improvement in symptoms  - Iron deficiency without anemia recommended increased iron intake  - Gout - on allopurinol last flare many years ago.   Current Outpatient Medications   Medication Instructions    albuterol sulfate (Proair Digihaler) 90 mcg/actuation aero powdr breath act w/sensor inhaler 2 puffs, inhalation, Every 6 hours PRN    albuterol 2.5 mg, nebulization, Every 6 hours    allopurinol (ZYLOPRIM) 100 mg, oral, Daily, as directed    calcium citrate-vitamin D3  (Citracal+D) 315 mg-5 mcg (200 unit) tablet oral    cholecalciferol (Vitamin D-3) 125 MCG (5000 UT) capsule 1 capsule, oral, Daily    cholecalciferol (VITAMIN D-3) 2,000 Units, oral    DULoxetine (CYMBALTA) 30 mg, oral, Daily    fluticasone (Flonase) 50 mcg/actuation nasal spray 1 spray, Each Nostril, Daily, Shake gently. Before first use, prime pump. After use, clean tip and replace cap.    gabapentin (NEURONTIN) 300 mg, oral, 2 times daily    levothyroxine (SYNTHROID, LEVOXYL) 150 mcg, oral, Daily    melatonin 3 mg tablet 1 tablet, oral, Nightly PRN    metoprolol succinate XL (TOPROL-XL) 50 mg, oral, Daily    omeprazole (PRILOSEC) 40 mg, oral, Daily, Do not crush or chew.    oxybutynin (DITROPAN) 5 mg, oral, Daily    potassium chloride ER (Micro-K) 8 mEq ER capsule 1 capsule, oral, 2 times daily    rosuvastatin (CRESTOR) 20 mg, oral, Daily    traZODone (DESYREL) 50 mg, oral, Nightly PRN    umeclidinium-vilanteroL (Anoro Ellipta) 62.5-25 mcg/actuation blister with device 1 puff, inhalation, Daily        Objective     /86   Pulse 66   Temp 35.6 °C (96 °F) (Temporal)   Wt 73.9 kg (163 lb)   SpO2 92%   BMI 30.80 kg/m²     Physical Exam  General: Alert and oriented, in no apparent distress   HEENT: No conjunctival erythema, no external facial lesions   Lungs: Breathing comfortably, left basilar crackles appreciated poor air entry bilaterally   Skin: No evidence of skin breakdown.  Neuro: AAO x 3, answering questions appropriately, no obvious cranial nerve deficits     Assessment/Plan   Problem List Items Addressed This Visit       COPD (chronic obstructive pulmonary disease) (CMS/[facility])     Followed by Dr. Field  maintained on anoro ellipta and albuterol. Currently stable, has not had recurrent flares.          Gout     On allopurinol 100mg which is controlling her gouty flares, none recent.           Relevant Orders    Uric acid    Hypothyroidism     Remains uncontrolled, did not escalate to 150 mcg)  recommended.  Will uptitrate and recheck TFTs in 6 to 8 weeks.         Relevant Medications    levothyroxine (Synthroid, Levoxyl) 150 mcg tablet    Other Relevant Orders    TSH with reflex to Free T4 if abnormal    Paroxysmal atrial fibrillation (CMS/HCC)     Precipitated by surgery not recommended anticoagulation followed by cardiology          Osteoporosis     With completion of 5-year regimen of alendronate with improvement in bone density and repeat DEXA performed in January.  Will discontinue with drug holiday for 2 years and reassess at that time.  Continue appropriate calcium and vitamin D, weightbearing exercises.         Relevant Orders    Vitamin D 25-Hydroxy,Total (for eval of Vitamin D levels)    Heartburn     Maintained on PPI monitor B12 levels.          Relevant Orders    Vitamin B12    Recurrent major depressive disorder (CMS/HCC)     On Cymbalta 30 mg to address this in greater detail at next visit         Esophageal dysmotility     With now recurrent episodes of what appears to be aspiration pneumonia.  Followed by Dr. Mora recommended consideration for surgical management but she is understandably hesitant.  Will follow-up with with GI and pulmonology in greater detail.          Other Visit Diagnoses       Encounter for preventative adult health care examination    -  Primary    Hyperlipidemia, unspecified hyperlipidemia type        Relevant Orders    Lipid Panel     Weight concerns  Briefly discussed, difficulty with losing weight despite significant reduction in intake due to esophageal motility issues.  May be interested in medical management so choice of medication will be limited due to current GI issues.  May consider Contrave if appropriate, will follow-up at next visit.     Follow-up as scheduled for annual wellness visit, labs prior.

## 2024-03-13 NOTE — ASSESSMENT & PLAN NOTE
Remains uncontrolled, did not escalate to 150 mcg) recommended.  Will uptitrate and recheck TFTs in 6 to 8 weeks.

## 2024-03-13 NOTE — ASSESSMENT & PLAN NOTE
Followed by Dr. Field  maintained on anoro ellipta and albuterol. Currently stable, has not had recurrent flares.

## 2024-03-13 NOTE — ASSESSMENT & PLAN NOTE
With now recurrent episodes of what appears to be aspiration pneumonia.  Followed by Dr. Mora recommended consideration for surgical management but she is understandably hesitant.  Will follow-up with with GI and pulmonology in greater detail.

## 2024-03-13 NOTE — ASSESSMENT & PLAN NOTE
With completion of 5-year regimen of alendronate with improvement in bone density and repeat DEXA performed in January.  Will discontinue with drug holiday for 2 years and reassess at that time.  Continue appropriate calcium and vitamin D, weightbearing exercises.

## 2024-03-21 ENCOUNTER — OFFICE VISIT (OUTPATIENT)
Dept: CARDIOLOGY | Facility: CLINIC | Age: 74
End: 2024-03-21
Payer: COMMERCIAL

## 2024-03-21 VITALS
SYSTOLIC BLOOD PRESSURE: 112 MMHG | HEIGHT: 60 IN | DIASTOLIC BLOOD PRESSURE: 78 MMHG | WEIGHT: 159.31 LBS | BODY MASS INDEX: 31.28 KG/M2 | OXYGEN SATURATION: 90 % | HEART RATE: 69 BPM

## 2024-03-21 DIAGNOSIS — I25.10 CORONARY ARTERY CALCIFICATION: ICD-10-CM

## 2024-03-21 DIAGNOSIS — Q24.8 LEFT VENTRICULAR OUTFLOW TRACT OBSTRUCTION (HHS-HCC): ICD-10-CM

## 2024-03-21 DIAGNOSIS — I25.84 CORONARY ARTERY CALCIFICATION: ICD-10-CM

## 2024-03-21 DIAGNOSIS — R06.09 DYSPNEA ON EXERTION: ICD-10-CM

## 2024-03-21 DIAGNOSIS — E78.00 HYPERCHOLESTEROLEMIA: Primary | ICD-10-CM

## 2024-03-21 PROCEDURE — 99214 OFFICE O/P EST MOD 30 MIN: CPT | Performed by: INTERNAL MEDICINE

## 2024-03-21 PROCEDURE — 1126F AMNT PAIN NOTED NONE PRSNT: CPT | Performed by: INTERNAL MEDICINE

## 2024-03-21 PROCEDURE — 1160F RVW MEDS BY RX/DR IN RCRD: CPT | Performed by: INTERNAL MEDICINE

## 2024-03-21 PROCEDURE — 1036F TOBACCO NON-USER: CPT | Performed by: INTERNAL MEDICINE

## 2024-03-21 PROCEDURE — 1159F MED LIST DOCD IN RCRD: CPT | Performed by: INTERNAL MEDICINE

## 2024-03-21 PROCEDURE — 1157F ADVNC CARE PLAN IN RCRD: CPT | Performed by: INTERNAL MEDICINE

## 2024-03-21 ASSESSMENT — ENCOUNTER SYMPTOMS
DEPRESSION: 0
LOSS OF SENSATION IN FEET: 0
OCCASIONAL FEELINGS OF UNSTEADINESS: 0

## 2024-03-21 ASSESSMENT — PAIN SCALES - GENERAL: PAINLEVEL: 0-NO PAIN

## 2024-03-21 NOTE — PATIENT INSTRUCTIONS
You were seen in the [facility] for your chronic shortness of breath (the medical term is called dyspnea).     I reviewed your 2022 heart and lung test results. Your coronary arteries do not have major blockages on CT chest angiogram.      For palpitations, I had you wear a 14 day event monitor after our last visit from 9/21 - 10/5/2023. Your heart rate monitor result was normal with an average heart rate of 70 beats a minute, rate extra heart beats (PACs, PVCs), and no atrial fibrillation.       On reviewing your March 2022 echocardiogram, there was a finding of hyperdynamic heart squeezing function with ejection fraction 70-75%. This caused a dynamic obstruction within the heart's pumping chamber that was causing your shortness of breath call LV outflow tract obstruction. Repeat December 2023 echocardiogram images no longer show a pressure build up inside your heart with a slower heart rate on metoprolol ER 50 mg a day.     Continue your metoprolol ER 50 mg a day to slow down your heart rate and give your heart more time to fill with blood to decrease your shortness of breath. Your heart murmur has disappeared and we are treating the heart signals of shortness of breath effectively.     We changed your cholesterol medication by stopping atorvastatin 40mg daily and starting rosuvastatin 20mg daily in September 2023. Get repeat fasting cholesterol blood work now to check how well rosuvastatin 20 mg a day is working.     Follow up with Dr. Carroll in 6 months.

## 2024-03-21 NOTE — PROGRESS NOTES
"Bridget Greenberg \"Sylwia\" is a 73 y.o. female who presents to the [facility]  for follow up of exertional dyspnea. Last seen in September 2023    Since our last visit, Ms. Greenberg has stable exertional dyspnea. Has been treated for episodes of silent aspiration that are contributing to dyspnea. Has GI follow up for esophageal motility concern.    Repeat December 2023 echocardiogram showed LVOT gradient was not present at HR target < 70 bpm.     Activity limited by knee pain with walking 1 block or 1 flight of stairs.     No active cardiac symptoms of chest pain, PND, orthopnea, ZOEY, palpitations, syncope, or claudication.      Past Medical History:  1. Asthma  2. GERD  3. Hypothyroidism  4. Coronary arteriosclerosis'  5. Dyslipidemia  6. lung cancer s/p right lower lobectomy 2020 and chemotherapy  7. COPD    Social History:  former heavy smoker, quit 2020 prior to lung cancer surgery     Family History:  No premature CAD in 1st degree relatives ( 55 years of age for male relatives, 65 years of age for female relatives)     Review of Systems    A 14 point review of systems was asked. All questions were negative except for pertinent positives listed in the HPI.      Objective   Physical Exam  BP Readings from Last 3 Encounters:   03/21/24 112/78   03/12/24 136/86   02/28/24 (!) 150/91      Wt Readings from Last 3 Encounters:   03/21/24 72.3 kg (159 lb 5 oz)   03/12/24 73.9 kg (163 lb)   02/28/24 73.8 kg (162 lb 11.2 oz)      BMI: Estimated body mass index is 31.11 kg/m² as calculated from the following:    Height as of this encounter: 1.524 m (5').    Weight as of this encounter: 72.3 kg (159 lb 5 oz).  BSA: Estimated body surface area is 1.75 meters squared as calculated from the following:    Height as of this encounter: 1.524 m (5').    Weight as of this encounter: 72.3 kg (159 lb 5 oz).    General: no acute distress  HEENT: EOMI, no scleral icterus.  Lungs: Clear to " "auscultation bilaterally without wheezing, rales, or rhonchi.  Cardiovascular: Regular rhythm and rate. Normal S1 and S2. No murmurs, rubs, or gallops are appreciated. JVP normal.  Abdomen: Soft, nontender, nondistended. Bowel sounds present.  Extremities: Warm and well perfused with equal 2+ pulses bilaterally.  No edema present.  Neurologic: Alert and oriented x3.    I have personally reviewed the following images and laboratory findings:  Last echocardiogram:  Most recent echo, 2023: LV EF 70%, no LVH (LVMI 50 gm/m2), intracavitary gradient not reported, normal diastology (E/e' 9), normal LA size (CAMRYN 22 ml/m2), normal RV/RA, trace MR, trace TR, RVSP 35 mm Hg (RAP 3 mm Hg).    Prior echo, 3/14/2022: LV EF 70-75%, hyperdyanmic LV function with dynamic intracavitary gradient 25 mm Hg w/ Valsalva, impaired relaxation LV diastology (E/e' 7), normal LA size (CAMRYN 21 ml/m2), normal RV/RA, no AI, trace MR, trace-mild TR, RVSP 39 mm Hg,    Last cath / stress test:  2020 SPECT stress: No ischemia or scar. LV EF > 65%    2022 CCTA: Nonobstructive coronary arteriosclerosis plaque (LAD prox/mid 25-50%, LCx: prox 25-50%, RCA: 25% prox).     2024 CT chest: Normal aorta course and caliber. Diffuse atherosclerosis in aorta and coronary arteries.    Most recent EC2023 ECG: Sinus rhythm, 61 bpm, borderline LAE pattern. Personally reviewed in office.     2023 - 10/5/2023 Holter monitor: Average HR 70 bpm (43 - 144 bpm), PVC and PAC < 1%, no AFIb.    Lab Results   Component Value Date    CHOL 212 (H) 2023    CHOL 183 2022    CHOL 175 2022     Lab Results   Component Value Date    HDL 63.0 2023    HDL 65.8 2022    HDL 78.9 2022     No results found for: \"LDLCALC\"  Lab Results   Component Value Date    TRIG 132 2023    TRIG 110 2022    TRIG 86 2022     No components found for: \"CHOLHDL\"      Assessment/Plan   1. Dyspnea on exertion:  Likely " multifactorial - hyperdynamic LV function with dynamic mid-cavity gradient due to small LV cavity and hyperdynamic systolic function, potential pulmonary causes (COPD, low DLCO after smoking and lung resection, etc.).     December 2023 echocardiogram showed no further LVOT gradient on metoprolol ER 50 mg a day with HR 60-70 bpm.     Instructed patient to keep fluid intake above 2 liters/day to help expand LV end diastolic volume as well. Lightheadedness due to orthostatic reaction to position change.      2.Coronary arteriosclerosis:  Goal LDL < 70 - not at goal on 2023 lab work. I switched from atorvastatin 40 to rosuvastatin 20 mg at 9/2023 office visit. Check repeat lipid panel.     Continue heart healthy diet. Will attempt to improve exercise capacity with treatment of her right knee pain now that she has improvement of dyspnea symptomatology.     3. Palpitations  Well controlled heart rate on 9/2023 14 day HR monitor. No AFib seen. Rare PAC/PVCs.      Follow up with Dr. Carroll in 6 months.            SIGNATURE: Sander Carroll MD PATIENT NAME: Breonna Greenberg   DATE/TIME: March 21, 2024 9:12 AM MRN: [id number]

## 2024-03-28 ENCOUNTER — LAB (OUTPATIENT)
Dept: LAB | Facility: LAB | Age: 74
End: 2024-03-28
Payer: COMMERCIAL

## 2024-03-29 ENCOUNTER — LAB (OUTPATIENT)
Dept: LAB | Facility: LAB | Age: 74
End: 2024-03-29
Payer: COMMERCIAL

## 2024-03-29 DIAGNOSIS — R12 HEARTBURN: ICD-10-CM

## 2024-03-29 DIAGNOSIS — E78.5 HYPERLIPIDEMIA, UNSPECIFIED HYPERLIPIDEMIA TYPE: ICD-10-CM

## 2024-03-29 DIAGNOSIS — M81.0 OSTEOPOROSIS, UNSPECIFIED OSTEOPOROSIS TYPE, UNSPECIFIED PATHOLOGICAL FRACTURE PRESENCE: ICD-10-CM

## 2024-03-29 DIAGNOSIS — M10.9 GOUT, UNSPECIFIED CAUSE, UNSPECIFIED CHRONICITY, UNSPECIFIED SITE: ICD-10-CM

## 2024-03-29 DIAGNOSIS — E78.00 HYPERCHOLESTEROLEMIA: ICD-10-CM

## 2024-03-29 DIAGNOSIS — E03.9 HYPOTHYROIDISM, UNSPECIFIED TYPE: ICD-10-CM

## 2024-03-29 LAB
25(OH)D3 SERPL-MCNC: 53 NG/ML (ref 30–100)
CHOLEST SERPL-MCNC: 160 MG/DL (ref 0–199)
CHOLESTEROL/HDL RATIO: 3.2
HDLC SERPL-MCNC: 49.4 MG/DL
LDLC SERPL CALC-MCNC: 92 MG/DL
NON HDL CHOLESTEROL: 111 MG/DL (ref 0–149)
T4 FREE SERPL-MCNC: 1.37 NG/DL (ref 0.78–1.48)
TRIGL SERPL-MCNC: 94 MG/DL (ref 0–149)
TSH SERPL-ACNC: 16.32 MIU/L (ref 0.44–3.98)
URATE SERPL-MCNC: 5.2 MG/DL (ref 2.3–6.7)
VIT B12 SERPL-MCNC: 442 PG/ML (ref 211–911)
VLDL: 19 MG/DL (ref 0–40)

## 2024-03-29 PROCEDURE — 84439 ASSAY OF FREE THYROXINE: CPT

## 2024-03-29 PROCEDURE — 82607 VITAMIN B-12: CPT

## 2024-03-29 PROCEDURE — 84550 ASSAY OF BLOOD/URIC ACID: CPT

## 2024-03-29 PROCEDURE — 84443 ASSAY THYROID STIM HORMONE: CPT

## 2024-03-29 PROCEDURE — 80061 LIPID PANEL: CPT

## 2024-03-29 PROCEDURE — 82306 VITAMIN D 25 HYDROXY: CPT

## 2024-03-29 PROCEDURE — 36415 COLL VENOUS BLD VENIPUNCTURE: CPT

## 2024-04-01 DIAGNOSIS — E03.9 HYPOTHYROIDISM, UNSPECIFIED TYPE: Primary | ICD-10-CM

## 2024-04-01 DIAGNOSIS — Z00.00 ENCOUNTER FOR PREVENTATIVE ADULT HEALTH CARE EXAMINATION: ICD-10-CM

## 2024-04-02 ENCOUNTER — OFFICE VISIT (OUTPATIENT)
Dept: PRIMARY CARE | Facility: CLINIC | Age: 74
End: 2024-04-02
Payer: COMMERCIAL

## 2024-04-02 VITALS
HEART RATE: 83 BPM | DIASTOLIC BLOOD PRESSURE: 86 MMHG | OXYGEN SATURATION: 97 % | WEIGHT: 160 LBS | SYSTOLIC BLOOD PRESSURE: 133 MMHG | BODY MASS INDEX: 31.25 KG/M2

## 2024-04-02 DIAGNOSIS — K22.4 ESOPHAGEAL DYSMOTILITY: ICD-10-CM

## 2024-04-02 DIAGNOSIS — Z13.89 SCREENING FOR MULTIPLE CONDITIONS: ICD-10-CM

## 2024-04-02 DIAGNOSIS — Z87.01 HISTORY OF ASPIRATION PNEUMONIA: ICD-10-CM

## 2024-04-02 DIAGNOSIS — K22.4 ESOPHAGEAL DYSMOTILITY: Primary | ICD-10-CM

## 2024-04-02 DIAGNOSIS — R41.3 MEMORY DEFICIT: ICD-10-CM

## 2024-04-02 DIAGNOSIS — Z00.00 ROUTINE GENERAL MEDICAL EXAMINATION AT HEALTH CARE FACILITY: Primary | ICD-10-CM

## 2024-04-02 DIAGNOSIS — I48.0 PAROXYSMAL ATRIAL FIBRILLATION (MULTI): ICD-10-CM

## 2024-04-02 DIAGNOSIS — R06.09 DYSPNEA ON EXERTION: ICD-10-CM

## 2024-04-02 DIAGNOSIS — M81.0 OSTEOPOROSIS, UNSPECIFIED OSTEOPOROSIS TYPE, UNSPECIFIED PATHOLOGICAL FRACTURE PRESENCE: ICD-10-CM

## 2024-04-02 DIAGNOSIS — E78.00 HYPERCHOLESTEROLEMIA: ICD-10-CM

## 2024-04-02 DIAGNOSIS — Z23 NEED FOR PNEUMOCOCCAL VACCINE: ICD-10-CM

## 2024-04-02 DIAGNOSIS — F33.9 EPISODE OF RECURRENT MAJOR DEPRESSIVE DISORDER, UNSPECIFIED DEPRESSION EPISODE SEVERITY (CMS-HCC): ICD-10-CM

## 2024-04-02 DIAGNOSIS — J43.9 PULMONARY EMPHYSEMA, UNSPECIFIED EMPHYSEMA TYPE (MULTI): ICD-10-CM

## 2024-04-02 DIAGNOSIS — E03.9 HYPOTHYROIDISM, UNSPECIFIED TYPE: ICD-10-CM

## 2024-04-02 PROBLEM — J32.9 SINUSITIS: Status: RESOLVED | Noted: 2023-10-11 | Resolved: 2024-04-02

## 2024-04-02 PROBLEM — G47.9 SLEEP DISTURBANCES: Status: RESOLVED | Noted: 2019-03-20 | Resolved: 2024-04-02

## 2024-04-02 PROBLEM — J18.9 PNEUMONIA: Status: RESOLVED | Noted: 2023-10-11 | Resolved: 2024-04-02

## 2024-04-02 PROCEDURE — 1160F RVW MEDS BY RX/DR IN RCRD: CPT | Performed by: INTERNAL MEDICINE

## 2024-04-02 PROCEDURE — 1157F ADVNC CARE PLAN IN RCRD: CPT | Performed by: INTERNAL MEDICINE

## 2024-04-02 PROCEDURE — G0009 ADMIN PNEUMOCOCCAL VACCINE: HCPCS | Performed by: INTERNAL MEDICINE

## 2024-04-02 PROCEDURE — 99214 OFFICE O/P EST MOD 30 MIN: CPT | Performed by: INTERNAL MEDICINE

## 2024-04-02 PROCEDURE — 1126F AMNT PAIN NOTED NONE PRSNT: CPT | Performed by: INTERNAL MEDICINE

## 2024-04-02 PROCEDURE — 1158F ADVNC CARE PLAN TLK DOCD: CPT | Performed by: INTERNAL MEDICINE

## 2024-04-02 PROCEDURE — G0444 DEPRESSION SCREEN ANNUAL: HCPCS | Performed by: INTERNAL MEDICINE

## 2024-04-02 PROCEDURE — 1123F ACP DISCUSS/DSCN MKR DOCD: CPT | Performed by: INTERNAL MEDICINE

## 2024-04-02 PROCEDURE — 1036F TOBACCO NON-USER: CPT | Performed by: INTERNAL MEDICINE

## 2024-04-02 PROCEDURE — 1159F MED LIST DOCD IN RCRD: CPT | Performed by: INTERNAL MEDICINE

## 2024-04-02 PROCEDURE — G0439 PPPS, SUBSEQ VISIT: HCPCS | Performed by: INTERNAL MEDICINE

## 2024-04-02 PROCEDURE — 1170F FXNL STATUS ASSESSED: CPT | Performed by: INTERNAL MEDICINE

## 2024-04-02 PROCEDURE — 90677 PCV20 VACCINE IM: CPT | Performed by: INTERNAL MEDICINE

## 2024-04-02 ASSESSMENT — ACTIVITIES OF DAILY LIVING (ADL)
MANAGING_FINANCES: INDEPENDENT
DRESSING: INDEPENDENT
BATHING: INDEPENDENT
DOING_HOUSEWORK: INDEPENDENT
GROCERY_SHOPPING: INDEPENDENT
TAKING_MEDICATION: INDEPENDENT

## 2024-04-02 ASSESSMENT — ENCOUNTER SYMPTOMS
LOSS OF SENSATION IN FEET: 0
DEPRESSION: 0
OCCASIONAL FEELINGS OF UNSTEADINESS: 1

## 2024-04-02 ASSESSMENT — PAIN SCALES - GENERAL: PAINLEVEL: 0-NO PAIN

## 2024-04-02 NOTE — PATIENT INSTRUCTIONS
Sylwia,  Followup with Dr. Field to discuss breathing symptoms.   Tetanus vaccine at the pharmacy.   Speech therapy as recommended by Dr. Mora, followup with him as scheduled in July   Recheck thyroid function again in 3-4 weeks.   Mammogram - Call [phone] to have this scheduled.   Followup with Dr. Field.   Followup 3 months

## 2024-04-02 NOTE — PROGRESS NOTES
"Subjective     Patient ID: Breonna Greenberg \"Sylwia\" is a 73 y.o. female who presents for No chief complaint on file., annual wellness visit and followup of chronic conditions.   HPI  73-year-old female here for annual wellness visit and follow-up of chronic conditions, last seen last month.    4/24 premature thyroid recheck in 3 weeks  LDL not quite at goal. Ua at goal.     - Weight concerns There will be concerns of multiple GI conditions concern regarding tolerability and contraindication, may consider Contrave if appropriate.  - Memory concerns - word searching, cannot recall why she went into a room. She goes to a senior center called [facility], does chair volleyball at the [employer]. Active exercise is limiting for her given multiple respiratory and chronic medical conditions. Previously played pickleball.     - Osteoporosis - on alendronate for 5 years discontinued 3/24 for drug holiday.  - Hypothyroidism - on levothyroxine 125mcg uptitrated to 150 mcg at last visit takes concurrently with PPI  - Esophageal dysphagia, hiatal hernia and GERD -  to solids and liquids seen by GI recommended manometry study, EGD showing retained esophageal fluid, and history of aspiration pneumonia. Diffuse dilatation of distal esophagus and ?achalasia. Recommended smaller meals and slower foods. One regular adult meal may last 2 days for her. Despite this, she notes difficulty with losing weight.    - COPD - advanced and dyspnea on exertion with hyperdynamic LV function and small LV cavity- followed by Dr. Field, on anoro ellipta, albuterol and nebulizers. Notes persistent dyspnea on exertion with prolonged walking, cardiology workup unremarkable, qualifies for nocturnal oxygen which she has been using for some time now also on Toprol XL  - GERD - on PPI takes it in the morning, morning meal is the largest meal of the day  - Lung cancer (NSCLC - SCC) s/p right lung resection in 2020 with negative findings of " recurrence on surveillance imaging complicated by tumor abutting the right atrium, post op pneumonia and post operative pericarditis s/p 4 cycles of adjuvant cisplatin/docetaxel with G-CSF support completed 12/20, last seen by onc Dr. Iglesias last month recommended surveillance every 6 months due in August  - Paroxysmal Afib -  precipitated by surgery not recommended AC followed by cardiology follow-up Dr. Carroll, also follows with Dr. Ramos.   - CAD -  LV tract outflow obstruction followed by cardiology   - HTN-on metoprolol ER 50 mg  - HLD - on rosuvastatin switched from atorvastatin.  - Fibromyalgia (and chest wall pain) , depression and anxiety - followed by pain management on cymbalta 30mg and gabapentin 600mg BID   - Insomnia - on trazodone, limited success.   - Right knee crystal arthropathy -  s/p steroid injection with Dr. Cheng  recommended continuation of allopurinol and voltaren, seen by Dr. Ospina thought related to OA   - Urinary urge incontinence on oxybutynin 5mg with significant improvement in symptoms  - Iron deficiency without anemia recommended increased iron intake  - Gout - on allopurinol last flare many years ago.     Social;   - Lives at home alone   - 3 children, oldest daughter a  in [place], middle daughter an  in [place], youngest son a  in [place]   - Niece an OB/GYN at [facility]   - Started getting back involved with the senior center.     Patient Care Team:  Gloria Mitchell DO as PCP - General (Internal Medicine)  Gloria Mitchell DO as PCP - [employer] PCP  Gloria Mitchell DO as PCP - [employer]vantage PCP  Lilli Iglesias MD as Consulting Physician (Hematology and Oncology)    Review of Systems:   General: No constitutional symptoms, significant changes in weight  HEENT: No headaches, changes in vision or hearing, no sinus or dental issues   Cardiac: No chest pain, palpitations, dyspnea on  exertion   Lungs: No cough, wheezing, shortness of breath   Abdomen: No abdominal pain, nausea/vomiting, diarrhea or constipation   : No urinary complaints   Musculoskeletal: no joint pains, swelling or tenderness   Heme: No bleeding or thrombosis issues   Lymph: No swollen lymph glands   Skin: No rashes or lesions   Psych: No reported anxiety or depression     Objective       Current Outpatient Medications:     albuterol 2.5 mg /3 mL (0.083 %) nebulizer solution, Take 3 mL (2.5 mg) by nebulization every 6 hours., Disp: , Rfl:     albuterol sulfate (Proair Digihaler) 90 mcg/actuation aero powdr breath act w/sensor inhaler, Inhale 2 puffs every 6 hours if needed for wheezing., Disp: , Rfl:     allopurinol (Zyloprim) 100 mg tablet, Take 1 tablet (100 mg) by mouth once daily. as directed, Disp: 90 tablet, Rfl: 0    DULoxetine (Cymbalta) 30 mg DR capsule, Take 1 capsule (30 mg) by mouth once daily., Disp: 90 capsule, Rfl: 0    fluticasone (Flonase) 50 mcg/actuation nasal spray, Administer 1 spray into each nostril once daily. Shake gently. Before first use, prime pump. After use, clean tip and replace cap., Disp: , Rfl:     gabapentin (Neurontin) 300 mg capsule, Take 1 capsule (300 mg) by mouth 2 times a day., Disp: 60 capsule, Rfl: 0    levothyroxine (Synthroid, Levoxyl) 150 mcg tablet, Take 1 tablet (150 mcg) by mouth once daily., Disp: 90 tablet, Rfl: 0    metoprolol succinate XL (Toprol-XL) 50 mg 24 hr tablet, TAKE 1 TABLET BY MOUTH DAILY, Disp: 100 tablet, Rfl: 2    omeprazole (PriLOSEC) 40 mg DR capsule, Take 1 capsule (40 mg) by mouth once daily. Do not crush or chew., Disp: 90 capsule, Rfl: 0    oxybutynin (Ditropan) 5 mg tablet, Take 1 tablet (5 mg) by mouth once daily., Disp: 90 tablet, Rfl: 0    rosuvastatin (Crestor) 20 mg tablet, Take 1 tablet (20 mg) by mouth once daily., Disp: 90 tablet, Rfl: 3    umeclidinium-vilanteroL (Anoro Ellipta) 62.5-25 mcg/actuation blister with device, Inhale 1 puff once  daily., Disp: , Rfl:       /86   Pulse 83   Wt 72.6 kg (160 lb)   SpO2 97%   BMI 31.25 kg/m²       Physical Examination:   General: Awake, alert, appears stated age   Head/eyes/ears: NCAT, EOMI, PERRL, TM WNL, minimal cerumen   Throat: moist mucus membranes, no pharyngeal erythema  Neck: Supple, nontender, no lymphadenopathy  Heart: RRR, no murmurs, rubs or gallops  Lungs: Reduced breath sounds at bases bilaterally, minimal crackles at right base.  Abdomen: Soft, NT/ND  Extremities: No edema  Skin: No concerning skin lesions on visualized skin   Neuro: AAO x 3, no FND, gait unremarkable    Assessment/Plan   Problem List Items Addressed This Visit       Dyspnea on exertion    Relevant Orders    Follow Up In [facility] - PCP - Established    Hypercholesterolemia     On atorvastatin 40 mg, not fully at goal hold off on further escalation at this time.         Hypothyroidism     Adjustment in levothyroxine will recheck TFTs again in 3-4 weeks. Long history of significant fluctuations in TSH levels.         Paroxysmal atrial fibrillation (CMS/HCC)     precipitated by surgery not recommended AC followed by cardiology follow-up Dr. Carroll, also follows with Dr. Ramos.          Osteoporosis     5 year completion of alendronate, on drug holiday started 3/24. Continue appropriate calcium and vitamin D, weightbearing exercises.         Pulmonary emphysema (CMS/HCC)     advanced and dyspnea on exertion with hyperdynamic LV function and small LV cavity- followed by Dr. Field, on anoro ellipta, albuterol and nebulizers. Notes persistent dyspnea on exertion with prolonged walking, cardiology workup unremarkable, qualifies for nocturnal oxygen which she has been using for some time now also on Toprol XL         Recurrent major depressive disorder (CMS/HCC)     On Cymbalta 30 mg         Esophageal dysmotility     Upcoming visit scheduled with GI in July, reached out to Dr. Mora.   Unfortunately no  additional management recommendations at this time other than small meals, has been referred to speech therapy. She is amenable.          Memory deficit     Mini cog performed score 4/5, no lab abnormalities romulo would contribute, has had brain imaging performed in 2018 MRI. NO overt evidence of etiology for mild cognitive impairment. Consider neuropsych testing, precontemplative.          Screening for multiple conditions     Phq2 performed and reviewed,           Other Visit Diagnoses       Routine general medical examination at health care facility    -  Primary    Need for pneumococcal vaccine        Relevant Orders    Pneumococcal conjugate vaccine, 20-valent (PREVNAR 20) (Completed)            Adult Health Examination  Age appropriate screening performed   Healthy lifestyle reviewed.   Depression screen as above  No additional pertinent family history or toxic habits   Cancer screening:   - Colonoscopy 3/23 diverticulosis, internal hemorrhoids no specimens collected  - Mammogram 5/23 wishes to be done every other year.   - Pap smear n/a   - Skin cancer prevention strategies reviewed   Immunizations: Tdap at pharmacy, Prevnar 20 today   Dental and visual examinations UTD   DEXA 1/24 as described above  Discussed adequate Vitamin D intake     Followup 3 months

## 2024-04-02 NOTE — PROGRESS NOTES
Gloria Mitchell, DO messaged me about worsening esophageal symtoms and aspiration.  Not much to help aperistalsis.  Suggested ST weaver.    Bnoi Mora, DO

## 2024-04-02 NOTE — ASSESSMENT & PLAN NOTE
Mini cog performed score 4/5, no lab abnormalities romulo would contribute, has had brain imaging performed in 2018 MRI. NO overt evidence of etiology for mild cognitive impairment. Consider neuropsych testing, precontemplative.

## 2024-04-03 NOTE — ASSESSMENT & PLAN NOTE
precipitated by surgery not recommended AC followed by cardiology follow-up Dr. Carroll, also follows with Dr. Ramos.

## 2024-04-03 NOTE — ASSESSMENT & PLAN NOTE
5 year completion of alendronate, on drug holiday started 3/24. Continue appropriate calcium and vitamin D, weightbearing exercises.

## 2024-04-03 NOTE — ASSESSMENT & PLAN NOTE
advanced and dyspnea on exertion with hyperdynamic LV function and small LV cavity- followed by Dr. Field, on anoro ellipta, albuterol and nebulizers. Notes persistent dyspnea on exertion with prolonged walking, cardiology workup unremarkable, qualifies for nocturnal oxygen which she has been using for some time now also on Toprol XL

## 2024-04-03 NOTE — ASSESSMENT & PLAN NOTE
Upcoming visit scheduled with GI in July, reached out to Dr. Mora.   Unfortunately no additional management recommendations at this time other than small meals, has been referred to speech therapy. She is amenable.

## 2024-05-04 DIAGNOSIS — M79.7 FIBROMYALGIA: ICD-10-CM

## 2024-05-06 RX ORDER — DULOXETIN HYDROCHLORIDE 30 MG/1
30 CAPSULE, DELAYED RELEASE ORAL DAILY
Qty: 90 CAPSULE | Refills: 1 | Status: SHIPPED | OUTPATIENT
Start: 2024-05-06

## 2024-05-06 RX ORDER — GABAPENTIN 300 MG/1
300 CAPSULE ORAL 2 TIMES DAILY
Qty: 60 CAPSULE | Refills: 3 | Status: SHIPPED | OUTPATIENT
Start: 2024-05-06

## 2024-05-07 DIAGNOSIS — R05.3 CHRONIC COUGH: Primary | ICD-10-CM

## 2024-05-07 RX ORDER — ALBUTEROL SULFATE 0.83 MG/ML
2.5 SOLUTION RESPIRATORY (INHALATION) EVERY 6 HOURS
Qty: 1080 ML | Refills: 3 | Status: SHIPPED | OUTPATIENT
Start: 2024-05-07 | End: 2025-05-02

## 2024-05-22 ENCOUNTER — OFFICE VISIT (OUTPATIENT)
Dept: PULMONOLOGY | Facility: CLINIC | Age: 74
End: 2024-05-22
Payer: COMMERCIAL

## 2024-05-22 ENCOUNTER — HOSPITAL ENCOUNTER (OUTPATIENT)
Dept: RADIOLOGY | Facility: CLINIC | Age: 74
Discharge: HOME | End: 2024-05-22
Payer: COMMERCIAL

## 2024-05-22 VITALS
DIASTOLIC BLOOD PRESSURE: 72 MMHG | OXYGEN SATURATION: 96 % | WEIGHT: 156 LBS | TEMPERATURE: 97.3 F | SYSTOLIC BLOOD PRESSURE: 110 MMHG | BODY MASS INDEX: 30.47 KG/M2 | HEART RATE: 75 BPM

## 2024-05-22 DIAGNOSIS — R05.3 CHRONIC COUGH: Primary | ICD-10-CM

## 2024-05-22 DIAGNOSIS — R05.3 CHRONIC COUGH: ICD-10-CM

## 2024-05-22 PROCEDURE — 1036F TOBACCO NON-USER: CPT | Performed by: INTERNAL MEDICINE

## 2024-05-22 PROCEDURE — 71046 X-RAY EXAM CHEST 2 VIEWS: CPT

## 2024-05-22 PROCEDURE — 99214 OFFICE O/P EST MOD 30 MIN: CPT | Performed by: INTERNAL MEDICINE

## 2024-05-22 PROCEDURE — 71046 X-RAY EXAM CHEST 2 VIEWS: CPT | Performed by: RADIOLOGY

## 2024-05-22 PROCEDURE — 1126F AMNT PAIN NOTED NONE PRSNT: CPT | Performed by: INTERNAL MEDICINE

## 2024-05-22 PROCEDURE — 1157F ADVNC CARE PLAN IN RCRD: CPT | Performed by: INTERNAL MEDICINE

## 2024-05-22 PROCEDURE — 1159F MED LIST DOCD IN RCRD: CPT | Performed by: INTERNAL MEDICINE

## 2024-05-22 PROCEDURE — 1160F RVW MEDS BY RX/DR IN RCRD: CPT | Performed by: INTERNAL MEDICINE

## 2024-05-22 RX ORDER — AMOXICILLIN AND CLAVULANATE POTASSIUM 500; 125 MG/1; MG/1
1 TABLET, FILM COATED ORAL 2 TIMES DAILY
Qty: 20 TABLET | Refills: 0 | Status: SHIPPED | OUTPATIENT
Start: 2024-05-22 | End: 2024-05-22 | Stop reason: WASHOUT

## 2024-05-22 RX ORDER — AMOXICILLIN AND CLAVULANATE POTASSIUM 500; 125 MG/1; MG/1
1 TABLET, FILM COATED ORAL 2 TIMES DAILY
Qty: 20 TABLET | Refills: 0 | Status: SHIPPED | OUTPATIENT
Start: 2024-05-22 | End: 2024-06-01

## 2024-05-22 ASSESSMENT — ENCOUNTER SYMPTOMS
AGITATION: 0
EYE REDNESS: 0
FEVER: 0
SLEEP DISTURBANCE: 0
DIFFICULTY URINATING: 0
EYE DISCHARGE: 0
PALPITATIONS: 0
ADENOPATHY: 0
STRIDOR: 0
FREQUENCY: 0
COUGH: 1
CHOKING: 0
DYSURIA: 0
HEADACHES: 0
WEAKNESS: 0
SINUS PRESSURE: 0
NAUSEA: 0
ARTHRALGIAS: 0
HEMATURIA: 0
SHORTNESS OF BREATH: 1
LIGHT-HEADEDNESS: 0
NERVOUS/ANXIOUS: 0
APNEA: 0
WHEEZING: 0
ABDOMINAL PAIN: 0
TREMORS: 0
RHINORRHEA: 0
ABDOMINAL DISTENTION: 0
FACIAL SWELLING: 0
SINUS PAIN: 0
NUMBNESS: 0
SPEECH DIFFICULTY: 0
JOINT SWELLING: 0
CONSTIPATION: 0
UNEXPECTED WEIGHT CHANGE: 0
FATIGUE: 0
BRUISES/BLEEDS EASILY: 0
DIZZINESS: 0

## 2024-05-22 ASSESSMENT — PAIN SCALES - GENERAL: PAINLEVEL: 0-NO PAIN

## 2024-05-22 NOTE — PROGRESS NOTES
@PULMONARY FOLLOW-UP@       PROBLEM:  aspiration     ASSESSMENT:  The patient is a 73-year-old with COPD, hypothyroidism, lung cancer with a hyperdynamic left ventricle with outflow obstruction.  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  There has been no evidence of recurrence and her main issue of late has been aspiration probably from achalasia.  For the latter there is no further medical treatment other than small meals speech therapy etc.  She has not been to the latter of late.  It should be appreciated that several weeks ago she had increasing cough and congestion and so a chest x-ray was obtained.  There is a faint infiltrate in the lingular on the lateral film.  For completeness we will put her on a course of Augmentin.  She does have an upcoming CT scan in several months.    PLAN:  Because there appears to be a  very minimal infiltrate in the lingular will place her on a course of Augmentin.  Her follow-up CT scan will be obtained in several months.  Continue with small meals etc.      HISTORY OF PRESENT ILLNESS:  This patient is 73-year-old with COPD and hypothyroidism with a history of lung cancer who was seen on March 30, 2022 elevated February of Complaining of shortness of breath over the last 4 months. After her right lower lobectomy she had atrial flutter and Pseudomonas pneumonia. She had the onset of shortness of breath over the previous 4 months without a definite exacerbation of COPD. Her PFTs performed on March 11, 2022 revealed a severely reduced DLCO but unchanged with a moderate degree of unchanged airflow obstruction. Her echocardiogram was unchanged with a mild elevation of her RVSP. With the relatively recent onset of shortness of breath I checked a D-dimer which was elevated. CFT angiogram revealed no PE on March 11, 2022. There was evidence of the previous right lower lobectomy. She was going to see cardiology for  completeness.      She ended up having a repeat CT angiogram in May 26, 2022 which revealed no PE. There was volume loss within the lingula which was new compared to the previous study. Also there was dilated esophagus with air-fluid levels and thickening distally associated with small hiatal hernia. Overall appearance similar to the recent prior. She did undergo an EGD on June 29, 2022. There is no evidence of any gastritis or H. pylori.           The CT scan from November 11, 2022 revealed the following: There has been interval clearing of previously seen sub-centimeter ground-glass centrilobular nodules in the mid aspect right upper lobe. However more inferiorly in the right upper lobe there has been interval development of peripheral predominant patchy ground-glass opacities and pleural based nodules (series 205 images 191-211). A few additional small peripheral ground-glass opacities in the right upper lobe (image 154/309), left upper lobe anterior segment (image 116/309), and left lower lobe along the major fissure (image 113/309) are new from prior. There has been an interval decrease in atelectasis in the lingula of the left upper lobe. There is mild-to-moderate apical predominant centrilobular emphysema.   As noted above her PFTs reveal moderate airflow obstruction with a significant bronchodilator response but because of her dyspnea she ended up seeing Dr. Carroll. It was felt that she had a hyperdynamic obstruction with a normal ejection fraction. Because of the outflow obstruction she was placed on metoprolol.      She was last seen on November 21, 2022 and stated that about a month ago she developed increasing shortness of breath and congestion and was seen by a home care service and placed on prednisone and antibiotics. She got better initially but then had increasing shortness of breath with thick phlegm production. She ended up having the CT scan as outlined above on November 11, 2022 revealing the  groundglass changes. Her emergency room assessment for COVID was negative. She continues to have thick phlegm production and has tried Mucinex.  It was felt that the groundglass change could be related to a postviral illness.  Also potentially beta-blocker was potentiating her COPD.  I gave her a prednisone taper and albuterol nebulization.  A follow-up CT scan was to be obtained in 3 months..  She also had a COPD flare as was reported on December 20, 2022.     A follow-up CT scan from November 12, 2022 outlined the clearing of some parenchymal changes on the previous study but there were new opacities in other areas with the finding suggesting waxing waning of infectious or inflammatory process.  A follow-up study from 8/15/2023 outlined no suspicious nodularity.     She recently saw Dr. Carroll on September 21, 2023 and described increasing shortness of breath.  He had noted a hyperdynamic echocardiogram in 2022 and she was having some palpitation.  A repeat echocardiogram was to be obtained along with a monitor and she was continued on her beta-blocker.  Surrounding that visit she noted shortness of breath walking 20-30 steps.  She also had some nasal discharge.  Furthermore she saw GI on September 22, 2023 for esophageal reflux.     It was noted that she was admitted to the hospital October 4 through October 5, 2023 with increasing shortness of breath.  She was found to have a new 2.4 x 2.2 cm masslike opacity.  Her COPD was treated with corticosteroids and bronchodilators.  The echocardiogram from October 12, 2023 outlined hyperdynamic ventricle with ejection fraction of 77% with diastolic dysfunction.  The RVSP was normal.  The changes from the CT scan from October 4, 2023 during the hospitalization appeared postinflammatory in nature and she was placed on Augmentin as outlined by Dr. Iglesias.  There were also some additional areas of nodularity which had increased inferiorly in the right lung and the left upper  lobe     October 24, 2023 it was reported that the patient finished the antibiotic about a week ago and this began coughing again.  She is bringing up yellow phlegm.  She has a drip in her throat and congestion.  She has low-grade fevers.  No chest pains or pressures.  She has no chills.    The esophagram from November 15, 2023 revealed the following      1.  Diffuse dilatation of distal 2/3 of the esophagus below the level  of aortic arch with absence of peristalsis, which can be seen with  scleroderma in appropriate clinical setting. A degree of achalasia  can also be considered in the differential, however there is no  significant hold-up of contrast in the esophagus.  2. Linearly oriented fine mucosal folds in the dilated esophageal  segment could be related to scleroderma, however  esophagitis/Hawkins's esophagus secondary to gastroesophageal reflux  can also be considered in the differential. Recommend endoscopic and  histopathologic correlation.  3. Small hiatal hernia.    It was noted on February 19, 2024 that pulse oximetry as outlined saturations equal less than 88% for over 4 hours.  Nocturnal oxygen ministration was ordered.    The CT scan of the chest from February 24, 2024 revealed the following  Improved patchiness in the right lower lobe.      There is consolidation and collapse in the left upper lobe. This may  represent scarring.      Stable nodules in the left lower lobe.      Coronary artery disease.      Dilated fluid-filled esophagus.     Her achalasia has been managed by GI and other than small meals and referral to speech therapy there are no other options.    The patient intermittently has been having phlegm production and she was better when humidity was low.  She did note some shortness of breath walking into the exam room.  She had some increasing cough and congestion several weeks ago but it seems to resolve.  She is eating slower and having 4 meals per day.  She has not had any choking  episodes.  She has not had any fevers or chills.  She notes that the albuterol does help when she uses a nebulizer.  She is on Anoro.    No Known Allergies         Current Outpatient Medications:     albuterol 2.5 mg /3 mL (0.083 %) nebulizer solution, Take 3 mL (2.5 mg) by nebulization every 6 hours., Disp: 1080 mL, Rfl: 3    albuterol sulfate (Proair Digihaler) 90 mcg/actuation aero powdr breath act w/sensor inhaler, Inhale 2 puffs every 6 hours if needed for wheezing., Disp: , Rfl:     allopurinol (Zyloprim) 100 mg tablet, Take 1 tablet (100 mg) by mouth once daily. as directed, Disp: 90 tablet, Rfl: 0    DULoxetine (Cymbalta) 30 mg DR capsule, TAKE 1 CAPSULE ONCE DAILY, Disp: 90 capsule, Rfl: 1    fluticasone (Flonase) 50 mcg/actuation nasal spray, Administer 1 spray into each nostril once daily. Shake gently. Before first use, prime pump. After use, clean tip and replace cap., Disp: , Rfl:     gabapentin (Neurontin) 300 mg capsule, TAKE 1 CAPSULE TWICE DAILY, Disp: 60 capsule, Rfl: 3    levothyroxine (Synthroid, Levoxyl) 150 mcg tablet, Take 1 tablet (150 mcg) by mouth once daily., Disp: 90 tablet, Rfl: 0    metoprolol succinate XL (Toprol-XL) 50 mg 24 hr tablet, TAKE 1 TABLET BY MOUTH DAILY, Disp: 100 tablet, Rfl: 2    omeprazole (PriLOSEC) 40 mg DR capsule, Take 1 capsule (40 mg) by mouth once daily. Do not crush or chew., Disp: 90 capsule, Rfl: 0    oxybutynin (Ditropan) 5 mg tablet, Take 1 tablet (5 mg) by mouth once daily., Disp: 90 tablet, Rfl: 0    rosuvastatin (Crestor) 20 mg tablet, Take 1 tablet (20 mg) by mouth once daily., Disp: 90 tablet, Rfl: 3    umeclidinium-vilanteroL (Anoro Ellipta) 62.5-25 mcg/actuation blister with device, Inhale 1 puff once daily., Disp: , Rfl:     amoxicillin-pot clavulanate (Augmentin) 500-125 mg tablet, Take 1 tablet by mouth 2 times a day for 10 days., Disp: 20 tablet, Rfl: 0          Review of Systems   Constitutional:  Negative for fatigue, fever and unexpected weight  change.   HENT:  Negative for congestion, facial swelling, nosebleeds, postnasal drip, rhinorrhea, sinus pressure and sinus pain.    Eyes:  Negative for discharge, redness and visual disturbance.   Respiratory:  Positive for cough and shortness of breath. Negative for apnea, choking, wheezing and stridor.    Cardiovascular:  Negative for chest pain, palpitations and leg swelling.   Gastrointestinal:  Negative for abdominal distention, abdominal pain, constipation and nausea.   Endocrine: Negative for cold intolerance and heat intolerance.   Genitourinary:  Negative for difficulty urinating, dysuria, frequency and hematuria.   Musculoskeletal:  Negative for arthralgias, gait problem and joint swelling.   Allergic/Immunologic: Negative for environmental allergies, food allergies and immunocompromised state.   Neurological:  Negative for dizziness, tremors, syncope, speech difficulty, weakness, light-headedness, numbness and headaches.   Hematological:  Negative for adenopathy. Does not bruise/bleed easily.   Psychiatric/Behavioral:  Negative for agitation, behavioral problems and sleep disturbance. The patient is not nervous/anxious.         Vitals:    05/22/24 1012   BP: 110/72   Pulse: 75   Temp: 36.3 °C (97.3 °F)   SpO2: 96%        Physical Exam  Vitals reviewed.   Constitutional:       Appearance: Normal appearance.   HENT:      Head: Normocephalic and atraumatic.   Eyes:      Extraocular Movements: Extraocular movements intact.   Cardiovascular:      Rate and Rhythm: Normal rate and regular rhythm.      Heart sounds: No murmur heard.     No friction rub. No gallop.   Pulmonary:      Effort: Pulmonary effort is normal. No respiratory distress.      Breath sounds: Normal breath sounds. No stridor. No wheezing, rhonchi or rales.      Comments: Breath sounds appear clear.  She was tachypneic and short of breath on entering the exam room  Chest:      Chest wall: No tenderness.   Abdominal:      General: Abdomen is  flat. There is no distension.      Palpations: Abdomen is soft. There is no mass.      Tenderness: There is no abdominal tenderness.   Musculoskeletal:         General: Normal range of motion.      Cervical back: Normal range of motion.      Right lower leg: No edema.      Left lower leg: No edema.   Skin:     General: Skin is warm and dry.   Neurological:      Mental Status: She is alert and oriented to person, place, and time.   Psychiatric:         Mood and Affect: Mood normal.         Behavior: Behavior normal.

## 2024-05-22 NOTE — PATIENT INSTRUCTIONS
Because there may be a very minimal infiltrate on the left will place on a course of Augmentin.  Your follow-up CT scan will be obtained in several months.  Continue with small meals etc.

## 2024-06-07 ENCOUNTER — HOSPITAL ENCOUNTER (OUTPATIENT)
Dept: RADIOLOGY | Facility: EXTERNAL LOCATION | Age: 74
Discharge: HOME | End: 2024-06-07

## 2024-06-07 DIAGNOSIS — R07.81 RIB PAIN ON RIGHT SIDE: ICD-10-CM

## 2024-07-25 ENCOUNTER — LAB (OUTPATIENT)
Dept: LAB | Facility: LAB | Age: 74
End: 2024-07-25
Payer: COMMERCIAL

## 2024-07-25 DIAGNOSIS — Z00.00 ENCOUNTER FOR PREVENTATIVE ADULT HEALTH CARE EXAMINATION: ICD-10-CM

## 2024-07-25 LAB — TSH SERPL-ACNC: 1.13 MIU/L (ref 0.44–3.98)

## 2024-07-25 PROCEDURE — 36415 COLL VENOUS BLD VENIPUNCTURE: CPT

## 2024-07-30 ENCOUNTER — OFFICE VISIT (OUTPATIENT)
Dept: GASTROENTEROLOGY | Facility: CLINIC | Age: 74
End: 2024-07-30
Payer: COMMERCIAL

## 2024-07-30 VITALS
HEIGHT: 60 IN | HEART RATE: 76 BPM | WEIGHT: 146.7 LBS | BODY MASS INDEX: 28.8 KG/M2 | DIASTOLIC BLOOD PRESSURE: 65 MMHG | TEMPERATURE: 97.1 F | SYSTOLIC BLOOD PRESSURE: 102 MMHG

## 2024-07-30 DIAGNOSIS — K22.4 ESOPHAGEAL DYSMOTILITY: Primary | ICD-10-CM

## 2024-07-30 DIAGNOSIS — R12 HEARTBURN: ICD-10-CM

## 2024-07-30 PROCEDURE — 99214 OFFICE O/P EST MOD 30 MIN: CPT | Performed by: INTERNAL MEDICINE

## 2024-07-30 PROCEDURE — 1160F RVW MEDS BY RX/DR IN RCRD: CPT | Performed by: INTERNAL MEDICINE

## 2024-07-30 PROCEDURE — 1159F MED LIST DOCD IN RCRD: CPT | Performed by: INTERNAL MEDICINE

## 2024-07-30 PROCEDURE — 1036F TOBACCO NON-USER: CPT | Performed by: INTERNAL MEDICINE

## 2024-07-30 PROCEDURE — 1157F ADVNC CARE PLAN IN RCRD: CPT | Performed by: INTERNAL MEDICINE

## 2024-07-30 PROCEDURE — 3008F BODY MASS INDEX DOCD: CPT | Performed by: INTERNAL MEDICINE

## 2024-07-30 RX ORDER — MULTIVITAMIN/IRON/FOLIC ACID 18MG-0.4MG
1 TABLET ORAL DAILY
COMMUNITY

## 2024-07-30 RX ORDER — MAGNESIUM GLYCINATE 100 MG
CAPSULE ORAL
COMMUNITY

## 2024-07-30 NOTE — PATIENT INSTRUCTIONS
I think your progress is satisfactory and would maintain your therapy with omeprazole before meals.    It would be reasonable to consider surgery to remove the lower portion of the esophagus (esophagectomy with gastric pull up) if your symptoms became much worse.  No reason to do that now.      No other medications are known to help your esophageal motility disorder.      Another endoscopy in 5118-2914 would be reasonable or sooner if things get worse.

## 2024-07-30 NOTE — PROGRESS NOTES
"Subjective   Patient ID: Breonna Greenberg \"Sylwia\" is a 73 y.o. female who presents for Follow-up.  Here in follow up about severe dysmotility     Doing ok to slightly better  Spent some time reviewing prior barium esophagram  Noted some worse symptoms with pill induced esophagitis like symptoms   Lost 14 lbs with better diet choices   Mindful eating processes    Heartburn stable but not gone     BM \"fine\"   Breathing is worse and that aspiration pneumonia might be the primary indication to consider esophagectomy with gastric pull up  No signs of oropharyngeal dysphagia             Review of Systems    Objective   Physical Exam  Constitutional:       Appearance: Normal appearance. She is normal weight.   Pulmonary:      Effort: Pulmonary effort is normal.   Neurological:      Mental Status: She is alert.         Assessment/Plan   Problem List Items Addressed This Visit             ICD-10-CM    Heartburn R12    Esophageal dysmotility - Primary K22.4     I think your progress is satisfactory and would maintain your therapy with omeprazole before meals.    It would be reasonable to consider surgery to remove the lower portion of the esophagus (esophagectomy with gastric pull up) if your symptoms became worse.         Boni Mora, DO 07/30/24 1:13 PM   "

## 2024-07-31 ENCOUNTER — APPOINTMENT (OUTPATIENT)
Dept: PRIMARY CARE | Facility: CLINIC | Age: 74
End: 2024-07-31
Payer: COMMERCIAL

## 2024-07-31 VITALS
WEIGHT: 152 LBS | BODY MASS INDEX: 29.69 KG/M2 | SYSTOLIC BLOOD PRESSURE: 125 MMHG | DIASTOLIC BLOOD PRESSURE: 79 MMHG | OXYGEN SATURATION: 89 % | TEMPERATURE: 96.9 F | HEART RATE: 74 BPM

## 2024-07-31 DIAGNOSIS — Z00.00 ENCOUNTER FOR ROUTINE ADULT HEALTH EXAMINATION WITHOUT ABNORMAL FINDINGS: Primary | ICD-10-CM

## 2024-07-31 DIAGNOSIS — G47.00 INSOMNIA, UNSPECIFIED TYPE: ICD-10-CM

## 2024-07-31 DIAGNOSIS — R06.09 DYSPNEA ON EXERTION: ICD-10-CM

## 2024-07-31 DIAGNOSIS — K22.4 ESOPHAGEAL DYSMOTILITY: ICD-10-CM

## 2024-07-31 DIAGNOSIS — E03.9 HYPOTHYROIDISM, UNSPECIFIED TYPE: ICD-10-CM

## 2024-07-31 PROBLEM — R07.81 CHEST PAIN, PLEURITIC: Status: RESOLVED | Noted: 2023-10-11 | Resolved: 2024-07-31

## 2024-07-31 PROBLEM — D72.829 LEUKOCYTOSIS: Status: RESOLVED | Noted: 2023-10-11 | Resolved: 2024-07-31

## 2024-07-31 PROBLEM — L25.9 CONTACT DERMATITIS: Status: RESOLVED | Noted: 2023-10-11 | Resolved: 2024-07-31

## 2024-07-31 PROBLEM — R79.89 D-DIMER, ELEVATED: Status: RESOLVED | Noted: 2023-10-11 | Resolved: 2024-07-31

## 2024-07-31 PROBLEM — R42 DIZZINESS: Status: RESOLVED | Noted: 2019-03-20 | Resolved: 2024-07-31

## 2024-07-31 PROBLEM — R03.0 ELEVATED BLOOD PRESSURE READING WITHOUT DIAGNOSIS OF HYPERTENSION: Status: RESOLVED | Noted: 2023-10-11 | Resolved: 2024-07-31

## 2024-07-31 PROBLEM — R60.9 EDEMA: Status: RESOLVED | Noted: 2023-10-11 | Resolved: 2024-07-31

## 2024-07-31 PROBLEM — R29.818 ABNORMAL ROMBERG TEST: Status: RESOLVED | Noted: 2023-10-11 | Resolved: 2024-07-31

## 2024-07-31 PROBLEM — K59.00 CONSTIPATION: Status: RESOLVED | Noted: 2023-10-11 | Resolved: 2024-07-31

## 2024-07-31 PROBLEM — R31.9 BLOOD IN URINE: Status: RESOLVED | Noted: 2023-10-11 | Resolved: 2024-07-31

## 2024-07-31 PROCEDURE — 99214 OFFICE O/P EST MOD 30 MIN: CPT | Performed by: INTERNAL MEDICINE

## 2024-07-31 PROCEDURE — 1036F TOBACCO NON-USER: CPT | Performed by: INTERNAL MEDICINE

## 2024-07-31 PROCEDURE — G2211 COMPLEX E/M VISIT ADD ON: HCPCS | Performed by: INTERNAL MEDICINE

## 2024-07-31 PROCEDURE — 1160F RVW MEDS BY RX/DR IN RCRD: CPT | Performed by: INTERNAL MEDICINE

## 2024-07-31 PROCEDURE — 1157F ADVNC CARE PLAN IN RCRD: CPT | Performed by: INTERNAL MEDICINE

## 2024-07-31 PROCEDURE — 1159F MED LIST DOCD IN RCRD: CPT | Performed by: INTERNAL MEDICINE

## 2024-07-31 NOTE — PROGRESS NOTES
"Subjective   Patient ID: Breonna Greenberg \"Sylwia\" is a 73 y.o. female who presents for No chief complaint on file..  HPI  73-year-old female here for follow-up visit, last seen in April for annual wellness visit.        7/24: TFTs now good.     - Esophageal dysphagia, hiatal hernia and GERD -  to solids and liquids seen by GI recommended manometry study, EGD showing retained esophageal fluid, and history of aspiration pneumonia. Diffuse dilatation of distal esophagus and ?achalasia. Recommended smaller meals and slower foods. One regular adult meal may last 2 days for her. Despite this, she notes difficulty with losing weight.  Seen by Dr. Patel recommended consideration for surgery to remove the lower portion of the esophagus if symptoms become worse. She has noted to have some improvement, eating less and slower but requiring her to eat more often.   - COPD - advanced and dyspnea on exertion with hyperdynamic LV function and small LV cavity- followed by Dr. Field, on anoro ellipta, albuterol and nebulizers. Notes persistent dyspnea on exertion with prolonged walking, cardiology workup unremarkable, using nocturnal oxygen. She was then seen by Dr. Field concerned about very minimal infiltrate in the lingular space treated with Augmentin. She is still experiencing symptoms of dyspnea on exertion. She recently traveled to [place] and is contemplating a move to [place].   - Lost 14 pounds gradual reduction since last being seen, intentional.   - Insomnia - discontinued trazodone no response. No response to melatonin. Continues to experience trouble falling asleep.     PMHx:   - Memory concerns recommend consideration for neuropsych testing  -  Osteoporosis - on alendronate for 5 years discontinued 3/24 for drug holiday.  - Hypothyroidism - on levothyroxine 150 mcg takes concurrently with PPI, most recent TFTs now wnl.    - GERD - on PPI takes it in the morning, morning meal is the largest meal of the day  - " Lung cancer (NSCLC - SCC) s/p right lung resection in 2020 with negative findings of recurrence on surveillance imaging complicated by tumor abutting the right atrium, post op pneumonia and post operative pericarditis s/p 4 cycles of adjuvant cisplatin/docetaxel with G-CSF support completed 12/20, seen by onc Dr. Iglesias recommended surveillance every 6 months due in August and scheduled with repeat CT chest pending as well.   - Paroxysmal Afib -  precipitated by surgery not recommended AC followed by cardiology follow-up Dr. Carroll, also follows with Dr. Ramos.   - CAD -  LV tract outflow obstruction followed by cardiology   - HTN-on metoprolol ER 50 mg  - HLD - on rosuvastatin 20  - Fibromyalgia (and chest wall pain) , depression and anxiety - followed by pain management on cymbalta 30mg and gabapentin 600mg BID   - Right knee crystal arthropathy -  s/p steroid injection with Dr. Cheng  recommended continuation of allopurinol and voltaren, seen by Dr. Ospina thought related to OA   - Urinary urge incontinence on oxybutynin 5mg with significant improvement in symptoms  - Iron deficiency without anemia recommended increased iron intake  - Gout - on allopurinol last flare many years ago.      Social;   - Lives at home alone   - 3 children, oldest daughter a  in [place], middle daughter an  in [place] (, undergoing IVF), youngest son a  in [place].   - No granchildren, has granddogs.   - Niece an OB/GYN at [facility]   - Started getting back involved with the senior center.     Current Outpatient Medications   Medication Instructions    albuterol sulfate (Proair Digihaler) 90 mcg/actuation aero powdr breath act w/sensor inhaler 2 puffs, inhalation, Every 6 hours PRN    albuterol 2.5 mg, nebulization, Every 6 hours    b complex 0.4 mg tablet 1 tablet, oral, Daily    BIOTIN ORAL 5,000 mcg, oral    cholecalciferol, vitD3,/vit K2 (vitamin D3-vitamin K2)  125 mcg (5,000 unit)-100 mcg capsule oral    DULoxetine (CYMBALTA) 30 mg, oral, Daily    fluticasone (Flonase) 50 mcg/actuation nasal spray 1 spray, Each Nostril, Daily, Shake gently. Before first use, prime pump. After use, clean tip and replace cap.    gabapentin (NEURONTIN) 300 mg, oral, 2 times daily    levothyroxine (SYNTHROID, LEVOXYL) 150 mcg, oral, Daily    metoprolol succinate XL (TOPROL-XL) 50 mg, oral, Daily    omeprazole (PRILOSEC) 40 mg, oral, Daily, Do not crush or chew.    oxybutynin (DITROPAN) 5 mg, oral, Daily    rosuvastatin (CRESTOR) 20 mg, oral, Daily    umeclidinium-vilanteroL (Anoro Ellipta) 62.5-25 mcg/actuation blister with device 1 puff, inhalation, Daily        Objective     /79   Pulse 74   Temp 36.1 °C (96.9 °F)   Wt 68.9 kg (152 lb)   SpO2 (!) 89%   BMI 29.69 kg/m²     Physical Exam  General: Alert and oriented, in no apparent distress   HEENT: No conjunctival erythema, no external facial lesions   Lungs: Breathing comfortably  Skin: No evidence of skin breakdown.  Neuro: AAO x 3, answering questions appropriately, no obvious cranial nerve deficits     Assessment/Plan     Assessment & Plan  Dyspnea on exertion  Without significant change in symptoms after treatment for possible infectious cause.   Followup with pulmonology, consider restarting pulmonary rehab.   Orders:    Follow Up In [facility] - PCP - Established    Encounter for routine adult health examination without abnormal findings    Orders:    Follow Up In [facility] - PCP - Medicare Annual; Future    Esophageal dysmotility  Some improvement in symptoms with small more frequent meals.   Recommended speech therapy   Seen by Dr. Mora if refractory may consider for surgical management.       Hypothyroidism, unspecified type  TFTs now within normal limits on current dose of levothyroxine but long history of significant fluctuations.   Will continue to monitor for now.        Insomnia, unspecified  type   Sleep onset insomnia, no response to trials of melatonin or trazodone.   Discussed consideration for CBTi and she is amenable. Resources provided.          Health Maintenance  Cancer Screening:  - Colonoscopy 3/23 diverticulosis, internal hemorrhoids no specimens collected  - Mammogram 5/23 prefers every other year  - Pap na  Immunizations: Flu shot and COVID booster recommended when available.    DEXA 1/24  AWV 4/23   Followup 6 months for AWV

## 2024-07-31 NOTE — PATIENT INSTRUCTIONS
Sylwia,   Here are some CBT-I resources:   CBT-I at  - Meghna Lankin, NP   GoToSleep- online course via [facility]. Self-guided. One time charge to sign up:     https://ezeep/products/go-to-sleep-online    SleepEZ- Free self-guided course from the [facility] https://www.veterantraining.[facility].gov/insomnia/   Dr. Rashid - one on one CBT-I service www.SnapUp  2.     Consider pulmonary rehab - followup with Dr. Field.  3.    High dose flu shot and updated COVID booster in the fall    4.    Followup 6 months for annual wellness visit

## 2024-07-31 NOTE — ASSESSMENT & PLAN NOTE
Without significant change in symptoms after treatment for possible infectious cause.   Followup with pulmonology, consider restarting pulmonary rehab.   Orders:    Follow Up In [facility] - PCP - Established

## 2024-08-01 NOTE — ASSESSMENT & PLAN NOTE
Sleep onset insomnia, no response to trials of melatonin or trazodone.   Discussed consideration for CBTi and she is amenable. Resources provided.

## 2024-08-01 NOTE — ASSESSMENT & PLAN NOTE
TFTs now within normal limits on current dose of levothyroxine but long history of significant fluctuations.   Will continue to monitor for now.

## 2024-08-01 NOTE — ASSESSMENT & PLAN NOTE
Some improvement in symptoms with small more frequent meals.   Recommended speech therapy   Seen by Dr. Mora if refractory may consider for surgical management.

## 2024-08-17 DIAGNOSIS — M79.7 FIBROMYALGIA: ICD-10-CM

## 2024-08-19 RX ORDER — GABAPENTIN 300 MG/1
300 CAPSULE ORAL 2 TIMES DAILY
Qty: 60 CAPSULE | Refills: 3 | Status: SHIPPED | OUTPATIENT
Start: 2024-08-19

## 2024-08-21 ENCOUNTER — LAB (OUTPATIENT)
Dept: LAB | Facility: LAB | Age: 74
End: 2024-08-21
Payer: COMMERCIAL

## 2024-08-21 DIAGNOSIS — C34.31 MALIGNANT NEOPLASM OF LOWER LOBE, RIGHT BRONCHUS OR LUNG (MULTI): Primary | ICD-10-CM

## 2024-08-21 LAB
CREAT SERPL-MCNC: 0.8 MG/DL (ref 0.6–1.3)
GFR SERPL CREATININE-BSD FRML MDRD: 77 ML/MIN/1.73M*2

## 2024-08-21 PROCEDURE — 36415 COLL VENOUS BLD VENIPUNCTURE: CPT

## 2024-08-23 ENCOUNTER — HOSPITAL ENCOUNTER (OUTPATIENT)
Dept: RADIOLOGY | Facility: CLINIC | Age: 74
Discharge: HOME | End: 2024-08-23
Payer: COMMERCIAL

## 2024-08-23 DIAGNOSIS — C34.31 MALIGNANT NEOPLASM OF LOWER LOBE OF RIGHT LUNG (MULTI): ICD-10-CM

## 2024-08-23 PROCEDURE — 2550000001 HC RX 255 CONTRASTS: Performed by: INTERNAL MEDICINE

## 2024-08-23 PROCEDURE — 71260 CT THORAX DX C+: CPT

## 2024-08-28 ENCOUNTER — OFFICE VISIT (OUTPATIENT)
Dept: HEMATOLOGY/ONCOLOGY | Facility: CLINIC | Age: 74
End: 2024-08-28
Payer: COMMERCIAL

## 2024-08-28 VITALS
TEMPERATURE: 96.4 F | WEIGHT: 155 LBS | DIASTOLIC BLOOD PRESSURE: 86 MMHG | HEART RATE: 67 BPM | BODY MASS INDEX: 30.27 KG/M2 | OXYGEN SATURATION: 93 % | SYSTOLIC BLOOD PRESSURE: 133 MMHG | RESPIRATION RATE: 16 BRPM

## 2024-08-28 DIAGNOSIS — C34.31 MALIGNANT NEOPLASM OF LOWER LOBE OF RIGHT LUNG (MULTI): ICD-10-CM

## 2024-08-28 DIAGNOSIS — C34.31 PRIMARY CANCER OF RIGHT LOWER LOBE OF LUNG (MULTI): Primary | ICD-10-CM

## 2024-08-28 PROCEDURE — 1036F TOBACCO NON-USER: CPT | Performed by: INTERNAL MEDICINE

## 2024-08-28 PROCEDURE — 99214 OFFICE O/P EST MOD 30 MIN: CPT | Performed by: INTERNAL MEDICINE

## 2024-08-28 PROCEDURE — 1126F AMNT PAIN NOTED NONE PRSNT: CPT | Performed by: INTERNAL MEDICINE

## 2024-08-28 PROCEDURE — 1159F MED LIST DOCD IN RCRD: CPT | Performed by: INTERNAL MEDICINE

## 2024-08-28 PROCEDURE — 99214 OFFICE O/P EST MOD 30 MIN: CPT | Mod: GC | Performed by: INTERNAL MEDICINE

## 2024-08-28 PROCEDURE — 1157F ADVNC CARE PLAN IN RCRD: CPT | Performed by: INTERNAL MEDICINE

## 2024-08-28 ASSESSMENT — PAIN SCALES - GENERAL: PAINLEVEL: 0-NO PAIN

## 2024-08-28 NOTE — PROGRESS NOTES
Patient ID: Sylwia Greenberg is a 74 y.o. female    Primary Care Provider: Gloria Mitchell DO    DIAGNOSIS AND STAGING  Stage IIIA ([id number]) NSCLC (squamous cell carcinoma) of the RLL -   S/p RLL lobectomy on 07/16/20 - intraoperatively the tumor was found to be abutting the right atrium (final pathology showed microscopic focus of invasion of pulmonary vein)      SITES OF DISEASE  RLL     MOLECULAR GENOMICS  MICROSATELLITE STATUS: Microsatellite Stable (JESSICA)     DISEASE ASSOCIATED GENOMIC FINDINGS:  TP53 p.E204* ([id number]: c.610G>T)  TP53 p.T670Ftw*51 ([id number]: [id number])     DISEASE RELEVANT ALTERATIONS NOT DETECTED:  Negative for ALK fusion.  Negative for BRAF V600E.  Negative for EGFR (sensitizing) mutation.  Negative for NTRK fusion.  Negative for ROS1 fusion.  Negative for RET fusion.  Negative for MET exon 14 skipping mutation.       PRIOR THERAPIES  4 cycles of adjuvant cisplatin/docetaxel from 10/30/2020 through 12/30/2020.     CURRENT THERAPY  Surveillance    CURRENT ONCOLOGICAL PROBLEMS  None     HISTORY OF PRESENT ILLNESS  smoker, who was found to have on a low dose screening CT a RLL mass. She was completely asymptomatic. Denies weight loss or fatigue  or respiratory sx.       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 Intra-operatively the tumor was found to be abutting the right  atrium -   pT4N0-   Tumor is TPS 35%      Meets with  medical oncology on 08/12/20:   After her surgery she developed PNS that led to a readmission less than 24 hours after discharge - Her last Levaquin dose was 2 days ago   She feels now very weak   Appetite is recovering -   She is using O2 (93-96%)  Endorses insomnia - using melatonin 5 mg to no avail     08/14/20: MRI brain shows no intracranial metastasis      08/16/20: admitted to [facility] with atrial tachycardia vs atypical flutter secondary to post-operative pericarditis. On amiodarone and colchicine (for 3 months) - following with cardiology.      10/30/20: C1D1 cisplatin/docetaxel   11/18/20: C2D1 cisplatin/docetaxel   12/09/20: C3D1 cisplatin/docetaxel   12/30/20: C4D1 cisplatin/docetaxel      PAST MEDICAL HISTORY  COPD  Gout  Osteopenia  Chemotherapy-induced peripheral neuropathy/chest wall pain related to prior to VATS procedure  Hypothyroidism  Insomnia  Hyperlipidemia  Hiatal hernia   GERD     SOCIAL HISTORY  She used to work as a  ([email or web address])-   She is  and lives alone  Has 3 children -   There is no occupational history     FAMILY HISTORY  Noncontributory    CURRENT MEDS REVIEWED       ALLERGIES  NKDA     SUBJECTIVE:  Presents for scan review. Overall doing well. Continues to have dyspnea on exertion even with short distances. Feels that humidity worsens this. Felt better when traveling to [place]. Does not take rescue inhaler often - maybe 1x per week. Cough is reported as the same - sputum is thick and white also unchanged. A couple weeks ago it seemed worse but has now resolved. No fevers. Energy levels are low but mostly feels this is limited to dyspnea. Appetite is okay - actively trying to lose weight. Eating smaller more frequent meals throughout the day.    A 13 point review of systems was performed, with significant findings documented above in subjective history.    OBJECTIVE:  Vitals:    08/28/24 1049   BP: 133/86   Pulse: 67   Resp: 16   Temp: 35.8 °C (96.4 °F)    SpO2: 93%      Body surface area is 1.73 meters squared.     Physical Exam  Constitutional:       Appearance: Normal appearance.   HENT:      Head: Atraumatic.   Eyes:      Extraocular Movements: Extraocular movements intact.      Conjunctiva/sclera: Conjunctivae normal.   Cardiovascular:      Rate and Rhythm: Normal rate.   Pulmonary:      Effort: Pulmonary effort is normal.      Breath sounds: Normal breath sounds.   Abdominal:      Palpations: Abdomen is soft.      Tenderness: There is no abdominal tenderness. There is no guarding.   Skin:     General: Skin is warm and dry.      Findings: No rash.   Neurological:      General: No focal deficit present.      Mental Status: She is alert and oriented to person, place, and time.      Motor: No weakness.      Gait: Gait normal.   Psychiatric:         Mood and Affect: Mood normal.         Behavior: Behavior normal.              Diagnostic Results   Results:  Labs:  Lab Results   Component Value Date    WBC 7.1 02/23/2024    HGB 13.0 02/23/2024    HCT 41.5 02/23/2024    MCV 96 02/23/2024     02/23/2024      Lab Results   Component Value Date    NEUTROABS 4.85 02/23/2024      Lab Results   Component Value Date    GLUCOSE 97 02/23/2024    CALCIUM 9.5 02/23/2024     02/23/2024    K 3.6 02/23/2024    CO2 30 02/23/2024     02/23/2024    BUN 15 02/23/2024    CREATININE 0.84 02/23/2024    MG 1.90 11/30/2023     Lab Results   Component Value Date    ALT 19 02/23/2024    AST 27 02/23/2024    ALKPHOS 93 02/23/2024    BILITOT 0.5 02/23/2024      Lab Results   Component Value Date    TSH 1.13 07/25/2024    FREET4 1.37 03/29/2024     STUDY:  CT CHEST W IV CONTRAST; 8/23/2024 10:15 am      IMPRESSION:  1. Stable postoperative changes right lower lobectomy with stable  atelectasis.  2. Slight interval increase bronchial wall thickening and scattered  bilateral ground-glass opacities, likely secondary to atypical  pneumonia.  3. Interval decrease in the mixed  patchy opacities in the lingula,  since prior.      Assessment/Plan   Stage IIIA non-small cell lung cancer (squamous cell carcinoma) of the right lower lobe, status post resection followed by adjuvant systemic chemotherapy as above.  Has been on surveillance ever since.  Most recent scans on 08/23/24 with no concerns for disease recurrence or new primary  Repeat scans in 6 months from now, February 2025, with a follow-up then      COPD  Follows with Dr. Field  Requiring oxygen nocturnally  Has had frequent episodes of PNA likely related to aspiration (history of hiatal hernia). Does not appear to be actively infected from clinical standpoint  Patient to reach out to pulm with any changes in her respiratory symptoms        Patient seen and discussed with Dr. Harris Murillo MD  Hematology-Oncology Fellow, PGY5      I saw and evaluated the patient. I personally obtained the key and critical portions of the history and physical exam or was physically present for key and critical portions performed by the resident/fellow. I reviewed the resident/fellow's documentation and discussed the patient with the resident/fellow. I agree with the resident/fellow's medical decision making as documented in the note.     Lilli Iglesias MD MS

## 2024-09-26 ENCOUNTER — APPOINTMENT (OUTPATIENT)
Dept: CARDIOLOGY | Facility: CLINIC | Age: 74
End: 2024-09-26
Payer: COMMERCIAL

## 2024-10-18 ENCOUNTER — OFFICE VISIT (OUTPATIENT)
Dept: URGENT CARE | Age: 74
End: 2024-10-18
Payer: COMMERCIAL

## 2024-10-18 ENCOUNTER — TELEPHONE (OUTPATIENT)
Dept: PULMONOLOGY | Facility: CLINIC | Age: 74
End: 2024-10-18
Payer: COMMERCIAL

## 2024-10-18 ENCOUNTER — ANCILLARY PROCEDURE (OUTPATIENT)
Dept: URGENT CARE | Age: 74
End: 2024-10-18
Payer: COMMERCIAL

## 2024-10-18 ENCOUNTER — DOCUMENTATION (OUTPATIENT)
Dept: PULMONOLOGY | Facility: HOSPITAL | Age: 74
End: 2024-10-18
Payer: COMMERCIAL

## 2024-10-18 VITALS
OXYGEN SATURATION: 94 % | WEIGHT: 140 LBS | SYSTOLIC BLOOD PRESSURE: 135 MMHG | TEMPERATURE: 97.7 F | HEART RATE: 110 BPM | DIASTOLIC BLOOD PRESSURE: 85 MMHG | BODY MASS INDEX: 27.34 KG/M2

## 2024-10-18 DIAGNOSIS — R05.9 COUGH, UNSPECIFIED TYPE: ICD-10-CM

## 2024-10-18 LAB
POC BINAX EXPIRATION: 0
POC BINAX NOW COVID SERIAL NUMBER: 0
POC SARS-COV-2 AG BINAX: NORMAL

## 2024-10-18 NOTE — TELEPHONE ENCOUNTER
Patient believes she has pneumonia again and would like a something called into her pharmacy. Please call to advise.

## 2024-10-18 NOTE — PATIENT INSTRUCTIONS
You have a viral illness. Continue taking over-the-counter medications to treat your symptoms. Take Tylenol Motrin for any aches and pains. You do not need antibiotics with a viral illness. It will go away on its own. Viral illnesses are self-limiting and take about 7 to 21 days until they are completely out of your system. Any worsening symptoms go to the ER. Otherwise follow-up with your primary care doctor.

## 2024-10-18 NOTE — PROGRESS NOTES
Patient is having increased shortness of breath and thick sputum production.  She may have aspirated.  I am not in the office the rest today and I told her to go to urgent care or the ER to be evaluated.  She likely needs an x-ray and antibiotics.  She will follow-up with me.

## 2024-10-18 NOTE — PROGRESS NOTES
"Subjective   Patient ID: Breonna Greenberg \"Sylwia\" is a 74 y.o. female. They present today with a chief complaint of Cough and Headache (C/o sob, tired, h/a prod cough with yellow green sputum x 4 days).    History of Present Illness  HPI  Pt is a 74 yr old female who presents with cough and fatigue and headache with yellow sputum.  Its been going on for 4 days.  She denies fevers.  She has history of aspiration pneumonia and was told to come in to get xray.  Past Medical History  Allergies as of 10/18/2024    (No Known Allergies)       (Not in a hospital admission)       Past Medical History:   Diagnosis Date    Acute sinusitis 10/11/2023    Urinary tract infection 10/11/2023       Past Surgical History:   Procedure Laterality Date    BACK SURGERY  2013    Back Surgery     SECTION, CLASSIC  2013     Section    CT ANGIO CORONARY ART WITH HEARTFLOW IF SCORE >30%  2022    CT HEART CORONARY ANGIOGRAM 2022 [facility] ANCILLARY LEGACY    MR HEAD ANGIO WO IV CONTRAST  3/14/2018    MR HEAD ANGIO WO IV CONTRAST 3/14/2018 [facility] ANCILLARY LEGACY    OTHER SURGICAL HISTORY  2020    Mediastinoscopy    OTHER SURGICAL HISTORY  2020    Thoracotomy    OTHER SURGICAL HISTORY  2020    Lung lobectomy        reports that she has quit smoking. Her smoking use included cigarettes. She has a 75 pack-year smoking history. She has never used smokeless tobacco. She reports that she does not drink alcohol and does not use drugs.    Review of Systems  Review of Systems  Gen: + fatigue, fever, sweats.  Head: No headache, trauma.  Eyes: No vision loss, double vision, drainage, eye pain.  ENT: No hearing changes, pain, epistaxis, congestion  Cardiac: No chest pain  Pulmonary: No shortness of breath,  pleuritic pain, + cough  Heme/lymph: No swollen glands  GI: No abdominal pain, nausea, vomiting, diarrhea  : No  dysuria, frequency, urgency, hematuria  Musculoskeletal: No limb pain, joint pain, back pain, " joint swelling or stiffness.  Skin: No rashes, pruritus, lumps, lesions.  Neuro: No Numbness, tingling, or weakness.  Psych: No  anxiety     Review of systems is otherwise negative unless stated above or in history of present illness.                             Objective    Vitals:    10/18/24 1200   BP: 135/85   Pulse: 110   Temp: 36.5 °C (97.7 °F)   SpO2: 94%   Weight: 63.5 kg (140 lb)     No LMP recorded. Patient is postmenopausal.    Physical Exam  General: Vital signs stable, Pt is alert, no acute distress  Eyes: Conjunctiva normal, PERRL, EOMs intact  HENMT: Normocephalic, atraumatic, external ears and nose normal, no scars or masses.  No mastoid tenderness. Trachea is midline. No meningeal signs, negative Kernig and Brudzinski, moves neck freely.  No sinus tenderness  Resp: Respiratory effort is normal, no retractions, no stridor. Lungs CTA, no wheezes or rhonchi  CV: Heart is regular rate and rhythm.   Skin: No evidence of trauma, skin is warm and dry. No rashes, lesions or ulcers.  Skel: full range of motion of upper and lower extremities.   Neuro: Normal gait, CN II-XII intact, no motor or sensory changes.  Psych: Alert and oriented ×3, judgment is appropriate, normal mood and affect   Procedures    Point of Care Test & Imaging Results from this visit  No results found for this visit on 10/18/24.   XR chest 2 views    Result Date: 10/18/2024  Interpreted By:  Lester Kilgore, STUDY: XR CHEST 2 VIEWS;  10/18/2024 12:12 pm   INDICATION: Signs/Symptoms:cough.   ,R05.9 Cough, unspecified   COMPARISON: 05/22/2024   ACCESSION NUMBER(S): [id number]   ORDERING CLINICIAN: NYLA SWARTZ   FINDINGS: PA and lateral radiographs of the chest were provided.     CARDIOMEDIASTINAL SILHOUETTE: Cardiomediastinal silhouette is normal in size and configuration.   LUNGS: Similar appearance of right costophrenic angle blunting which likely reflects trace pleural effusion and scarring. No focal consolidation. No pneumothorax.    ABDOMEN: No remarkable upper abdominal findings.   BONES: No acute osseous changes.       1.  Overall similar appearance of the chest without focal consolidation or pneumothorax.     MACRO: None   Signed by: Lester Kilgore 10/18/2024 12:20 PM Dictation workstation:   [id number]     Diagnostic study results (if any) were reviewed by MARLI Brooks.    Assessment/Plan   Allergies, medications, history, and pertinent labs/EKGs/Imaging reviewed by MARLI Brooks.     Medical Decision Making  History and physical exam consistent with viral bronchitis. No evidence of pneumonia, sepsis or other acute cardiopulmonary process.  Neg X-ray, covid neg, no antibiotics, or further work-up warranted at this time.    Return to the office with any new or worsening symptoms.  Patient agreeable with plan and verbalized understanding.        Orders and Diagnoses  Diagnoses and all orders for this visit:  Cough, unspecified type  -     XR chest 2 views      Medical Admin Record      Patient disposition: Home    Electronically signed by MARLI Brooks  12:26 PM

## 2024-11-07 ENCOUNTER — OFFICE VISIT (OUTPATIENT)
Dept: CARDIOLOGY | Facility: CLINIC | Age: 74
End: 2024-11-07
Payer: COMMERCIAL

## 2024-11-07 VITALS
HEART RATE: 91 BPM | HEIGHT: 60 IN | BODY MASS INDEX: 30.67 KG/M2 | SYSTOLIC BLOOD PRESSURE: 130 MMHG | WEIGHT: 156.19 LBS | DIASTOLIC BLOOD PRESSURE: 85 MMHG | OXYGEN SATURATION: 92 %

## 2024-11-07 DIAGNOSIS — E78.00 HYPERCHOLESTEROLEMIA: ICD-10-CM

## 2024-11-07 DIAGNOSIS — I25.10 CORONARY ARTERY CALCIFICATION: ICD-10-CM

## 2024-11-07 DIAGNOSIS — R00.2 PALPITATION: ICD-10-CM

## 2024-11-07 DIAGNOSIS — Q24.8 LEFT VENTRICULAR OUTFLOW TRACT OBSTRUCTION (HHS-HCC): ICD-10-CM

## 2024-11-07 DIAGNOSIS — I25.10 CORONARY ARTERIOSCLEROSIS: Primary | ICD-10-CM

## 2024-11-07 PROBLEM — I48.0 PAROXYSMAL ATRIAL FIBRILLATION (MULTI): Status: RESOLVED | Noted: 2023-05-04 | Resolved: 2024-11-07

## 2024-11-07 PROCEDURE — 93005 ELECTROCARDIOGRAM TRACING: CPT | Performed by: INTERNAL MEDICINE

## 2024-11-07 PROCEDURE — 99214 OFFICE O/P EST MOD 30 MIN: CPT | Performed by: INTERNAL MEDICINE

## 2024-11-07 PROCEDURE — 1126F AMNT PAIN NOTED NONE PRSNT: CPT | Performed by: INTERNAL MEDICINE

## 2024-11-07 PROCEDURE — 99417 PROLNG OP E/M EACH 15 MIN: CPT | Performed by: INTERNAL MEDICINE

## 2024-11-07 PROCEDURE — 1157F ADVNC CARE PLAN IN RCRD: CPT | Performed by: INTERNAL MEDICINE

## 2024-11-07 PROCEDURE — 1160F RVW MEDS BY RX/DR IN RCRD: CPT | Performed by: INTERNAL MEDICINE

## 2024-11-07 PROCEDURE — 3008F BODY MASS INDEX DOCD: CPT | Performed by: INTERNAL MEDICINE

## 2024-11-07 PROCEDURE — 1159F MED LIST DOCD IN RCRD: CPT | Performed by: INTERNAL MEDICINE

## 2024-11-07 PROCEDURE — 1036F TOBACCO NON-USER: CPT | Performed by: INTERNAL MEDICINE

## 2024-11-07 RX ORDER — METOPROLOL SUCCINATE 100 MG/1
100 TABLET, EXTENDED RELEASE ORAL DAILY
Qty: 90 TABLET | Refills: 3 | Status: SHIPPED | OUTPATIENT
Start: 2024-11-07

## 2024-11-07 ASSESSMENT — ENCOUNTER SYMPTOMS
LOSS OF SENSATION IN FEET: 1
OCCASIONAL FEELINGS OF UNSTEADINESS: 1
DEPRESSION: 0

## 2024-11-07 ASSESSMENT — PATIENT HEALTH QUESTIONNAIRE - PHQ9
2. FEELING DOWN, DEPRESSED OR HOPELESS: NOT AT ALL
SUM OF ALL RESPONSES TO PHQ9 QUESTIONS 1 AND 2: 0
1. LITTLE INTEREST OR PLEASURE IN DOING THINGS: NOT AT ALL

## 2024-11-07 ASSESSMENT — COLUMBIA-SUICIDE SEVERITY RATING SCALE - C-SSRS
2. HAVE YOU ACTUALLY HAD ANY THOUGHTS OF KILLING YOURSELF?: NO
6. HAVE YOU EVER DONE ANYTHING, STARTED TO DO ANYTHING, OR PREPARED TO DO ANYTHING TO END YOUR LIFE?: NO
1. IN THE PAST MONTH, HAVE YOU WISHED YOU WERE DEAD OR WISHED YOU COULD GO TO SLEEP AND NOT WAKE UP?: NO

## 2024-11-07 ASSESSMENT — PAIN SCALES - GENERAL: PAINLEVEL_OUTOF10: 0-NO PAIN

## 2024-11-07 NOTE — PROGRESS NOTES
"Bridget Greenberg \"Sylwia\" is a 74 y.o. female who presents to the [facility]  for follow up of exertional dyspnea. Last seen in March 2024    Since our last visit, Ms. Greenberg has more exertional dyspnea and lightheadedness. Note elevated heart rate with activity. Is attempting to increase oral fluid intake. Notes orthostasis with standing, worse in the morning.     Repeat December 2023 echocardiogram showed LVOT gradient was not present at HR target < 70 bpm.     Activity limited by knee pain with walking 1 block or 1 flight of stairs.     No active cardiac symptoms of chest pain, PND, orthopnea, ZOEY, syncope, or claudication.      Past Medical History:  1. Asthma  2. GERD  3. Hypothyroidism  4. Coronary arteriosclerosis'  5. Dyslipidemia  6. lung cancer s/p right lower lobectomy 2020 and chemotherapy  7. COPD  8. 8/16/2020 atypical atrial flutter episode in setting of pericarditis s/p July 2020 VATS surgery. No recurrence. Took 1 month of postoperative amiodarone and stopped after 8/28/2024 evaluation with Dr. Ramos.    Social History:  former heavy smoker, quit 2020 prior to lung cancer surgery     Family History:  No premature CAD in 1st degree relatives ( 55 years of age for male relatives, 65 years of age for female relatives)     Review of Systems    A 14 point review of systems was asked. All questions were negative except for pertinent positives listed in the HPI.      Objective   Physical Exam  BP Readings from Last 3 Encounters:   11/07/24 130/85   10/18/24 135/85   08/28/24 133/86      Wt Readings from Last 3 Encounters:   11/07/24 70.8 kg (156 lb 3 oz)   10/18/24 63.5 kg (140 lb)   08/28/24 70.3 kg (155 lb)      BMI: Estimated body mass index is 30.5 kg/m² as calculated from the following:    Height as of this encounter: 1.524 m (5').    Weight as of this encounter: 70.8 kg (156 lb 3 oz).  BSA: Estimated body surface area is 1.73 meters squared as calculated from " the following:    Height as of this encounter: 1.524 m (5').    Weight as of this encounter: 70.8 kg (156 lb 3 oz).    General: no acute distress  HEENT: EOMI, no scleral icterus.  Lungs: Clear to auscultation bilaterally without wheezing, rales, or rhonchi.  Cardiovascular: Regular rhythm and rate. Normal S1 and S2. No murmurs, rubs, or gallops are appreciated. JVP normal.  Abdomen: Soft, nontender, nondistended. Bowel sounds present.  Extremities: Warm and well perfused with equal 2+ pulses bilaterally.  No edema present.  Neurologic: Alert and oriented x3.    I have personally reviewed the following images and laboratory findings:  Last echocardiogram:  Most recent echo, 2023: LV EF 70%, no LVH (LVMI 50 gm/m2), intracavitary gradient not reported, normal diastology (E/e' 9), normal LA size (CAMRYN 22 ml/m2), normal RV/RA, trace MR, trace TR, RVSP 35 mm Hg (RAP 3 mm Hg).    Prior echo, 3/14/2022: LV EF 70-75%, hyperdyanmic LV function with dynamic intracavitary gradient 25 mm Hg w/ Valsalva, impaired relaxation LV diastology (E/e' 7), normal LA size (CAMRYN 21 ml/m2), normal RV/RA, no AI, trace MR, trace-mild TR, RVSP 39 mm Hg,    Last cath / stress test:  2020 SPECT stress: No ischemia or scar. LV EF > 65%    2022 CCTA: Nonobstructive coronary arteriosclerosis plaque (LAD prox/mid 25-50%, LCx: prox 25-50%, RCA: 25% prox).     2024 CT chest: Normal aorta course and caliber. Diffuse atherosclerosis in aorta and coronary arteries.    Most recent EC2023 ECG: Sinus rhythm, 61 bpm, borderline LAE pattern. Personally reviewed in office.     2023 - 10/5/2023 Holter monitor: Average HR 70 bpm (43 - 144 bpm), PVC and PAC < 1%, no AFIb.    Lab Results   Component Value Date    CHOL 160 2024    CHOL 212 (H) 2023    CHOL 183 2022     Lab Results   Component Value Date    HDL 49.4 2024    HDL 63.0 2023    HDL 65.8 2022     Lab Results   Component Value Date     "LDLCALC 92 03/29/2024     Lab Results   Component Value Date    TRIG 94 03/29/2024    TRIG 132 06/26/2023    TRIG 110 05/13/2022     No components found for: \"CHOLHDL\"      Assessment/Plan   1. Dyspnea on exertion:  Likely multifactorial - hyperdynamic LV function with dynamic mid-cavity gradient due to small LV cavity and hyperdynamic systolic function, potential pulmonary causes (COPD, low DLCO after smoking and lung resection, etc.).     December 2023 echocardiogram showed no further LVOT gradient on metoprolol ER 50 mg a day with HR 60-70 bpm.     Heart rate today is 91 bpm, recent vital signs are HR 90s-100s bpm. Increase metoprolol ER to 100 mg a day.    Instructed patient to keep fluid intake above 2 liters/day to help expand LV end diastolic volume as well. Lightheadedness due to orthostatic reaction to position change.      2.Coronary arteriosclerosis:  Goal LDL < 70 - not at goal on 2023 lab work. I switched from atorvastatin 40 to rosuvastatin 20 mg at 9/2023 office visit.      Continue heart healthy diet. Continue aerobic exercise program.    3. Palpitations  Well controlled heart rate on 9/2023 14 day HR monitor. No AFib seen. Rare PAC/PVCs.     August 2020 atypical atrial flutter episode (< 1 hour) was due to post-operative status from VATS and resolved with 1 month of amiodarone and resolution of post-op pericarditis. Will observe.     Follow up with Dr. Carroll in 6 months.     Time = 30 minutes on direct patient care reviewing current symptoms and discussing treatment options, reviewing prior cardiac care, and coordinating follow up testing and medication renewal.        SIGNATURE: Sander Carroll MD PATIENT NAME: Breonna Greenberg   DATE/TIME: November 7, 2024 9:37 AM MRN: [id number]     "

## 2024-11-07 NOTE — PATIENT INSTRUCTIONS
You were seen in the [facility] for your chronic shortness of breath (the medical term is called dyspnea).     I reviewed your 2022 heart and lung test results. Your coronary arteries do not have major blockages on CT chest angiogram.      For palpitations, I had you wear a 14 day event monitor after our last visit from 9/21 - 10/5/2023. Your heart rate monitor result was normal with an average heart rate of 70 beats a minute, rate extra heart beats (PACs, PVCs), and no atrial fibrillation.       On reviewing your March 2022 echocardiogram, there was a finding of hyperdynamic heart squeezing function with ejection fraction 70-75%. This caused a dynamic obstruction within the heart's pumping chamber that was causing your shortness of breath call LV outflow tract obstruction. Repeat December 2023 echocardiogram images no longer show a pressure build up inside your heart with a slower heart rate on metoprolol ER 50 mg a day.     Your heart rate today is 91 beats a minute above goal range for you of 60 - 70 bpm. I am increasing your metoprolol ER to 100 mg a day to slow down your heart rate and give your heart more time to fill with blood to decrease your shortness of breath. Your heart murmur has disappeared and we are treating the heart signals of shortness of breath effectively.    Drink more fluids through out the day to help with your lightheadedness. Drink 2 liters (8 8 ounce cups) daily with 16 ounces of water first in the morning.     We changed your cholesterol medication by stopping atorvastatin 40mg daily and starting rosuvastatin 20mg daily in September 2023.      Follow up with Dr. Carroll in 6 months.

## 2024-11-08 LAB
ATRIAL RATE: 91 BPM
P AXIS: 78 DEGREES
P OFFSET: 206 MS
P ONSET: 157 MS
PR INTERVAL: 136 MS
Q ONSET: 225 MS
QRS COUNT: 14 BEATS
QRS DURATION: 76 MS
QT INTERVAL: 374 MS
QTC CALCULATION(BAZETT): 460 MS
QTC FREDERICIA: 430 MS
R AXIS: 55 DEGREES
T AXIS: 61 DEGREES
T OFFSET: 412 MS
VENTRICULAR RATE: 91 BPM

## 2024-11-15 DIAGNOSIS — M79.7 FIBROMYALGIA: ICD-10-CM

## 2024-11-17 RX ORDER — DULOXETIN HYDROCHLORIDE 30 MG/1
30 CAPSULE, DELAYED RELEASE ORAL DAILY
Qty: 90 CAPSULE | Refills: 1 | Status: SHIPPED | OUTPATIENT
Start: 2024-11-17

## 2024-11-17 RX ORDER — GABAPENTIN 300 MG/1
300 CAPSULE ORAL 2 TIMES DAILY
Qty: 180 CAPSULE | Refills: 1 | Status: SHIPPED | OUTPATIENT
Start: 2024-11-17

## 2024-11-26 ENCOUNTER — TELEPHONE (OUTPATIENT)
Dept: ADMISSION | Facility: HOSPITAL | Age: 74
End: 2024-11-26
Payer: COMMERCIAL

## 2024-11-26 NOTE — TELEPHONE ENCOUNTER
I called and left V/M to tell Breonna no other labs needed besides the Creatinine ordered for 2/28/2025 by Dr. Iglesias. If she had any questions she can call the nurse line back at [phone].

## 2024-11-26 NOTE — TELEPHONE ENCOUNTER
Breonna called and requested her lab orders to be placed for her 3/2025 appointment with Dr. Restrepo and for her CT scan on 2/28/25. Messaged team.

## 2024-11-26 NOTE — TELEPHONE ENCOUNTER
I did see that ordered from Dr. Iglesias. She mentioned she usually gets more labs. I will tell her this is all that is needed. Thanks [place].

## 2025-01-30 ENCOUNTER — HOSPITAL ENCOUNTER (EMERGENCY)
Facility: HOSPITAL | Age: 75
Discharge: HOME | End: 2025-01-30
Attending: STUDENT IN AN ORGANIZED HEALTH CARE EDUCATION/TRAINING PROGRAM
Payer: COMMERCIAL

## 2025-01-30 ENCOUNTER — APPOINTMENT (OUTPATIENT)
Dept: RADIOLOGY | Facility: HOSPITAL | Age: 75
End: 2025-01-30
Payer: COMMERCIAL

## 2025-01-30 ENCOUNTER — APPOINTMENT (OUTPATIENT)
Dept: CARDIOLOGY | Facility: HOSPITAL | Age: 75
End: 2025-01-30
Payer: COMMERCIAL

## 2025-01-30 ENCOUNTER — TELEPHONE (OUTPATIENT)
Dept: PULMONOLOGY | Facility: CLINIC | Age: 75
End: 2025-01-30
Payer: COMMERCIAL

## 2025-01-30 ENCOUNTER — OFFICE VISIT (OUTPATIENT)
Dept: URGENT CARE | Age: 75
End: 2025-01-30
Payer: COMMERCIAL

## 2025-01-30 VITALS
RESPIRATION RATE: 14 BRPM | WEIGHT: 155 LBS | HEART RATE: 73 BPM | DIASTOLIC BLOOD PRESSURE: 80 MMHG | SYSTOLIC BLOOD PRESSURE: 116 MMHG | BODY MASS INDEX: 30.27 KG/M2 | OXYGEN SATURATION: 93 % | TEMPERATURE: 97.5 F

## 2025-01-30 VITALS
BODY MASS INDEX: 30.43 KG/M2 | RESPIRATION RATE: 23 BRPM | DIASTOLIC BLOOD PRESSURE: 79 MMHG | OXYGEN SATURATION: 92 % | WEIGHT: 155 LBS | HEART RATE: 82 BPM | HEIGHT: 60 IN | SYSTOLIC BLOOD PRESSURE: 135 MMHG | TEMPERATURE: 97.2 F

## 2025-01-30 DIAGNOSIS — J20.9 BRONCHITIS, CHRONIC OBSTRUCTIVE W ACUTE BRONCHITIS (MULTI): Primary | ICD-10-CM

## 2025-01-30 DIAGNOSIS — J44.1 COPD EXACERBATION (MULTI): Primary | ICD-10-CM

## 2025-01-30 DIAGNOSIS — J44.0 BRONCHITIS, CHRONIC OBSTRUCTIVE W ACUTE BRONCHITIS (MULTI): Primary | ICD-10-CM

## 2025-01-30 DIAGNOSIS — Z85.118 HISTORY OF LUNG CANCER: ICD-10-CM

## 2025-01-30 DIAGNOSIS — R06.02 SHORTNESS OF BREATH: ICD-10-CM

## 2025-01-30 LAB
ANION GAP SERPL CALC-SCNC: 16 MMOL/L (ref 10–20)
BASOPHILS # BLD AUTO: 0.03 X10*3/UL (ref 0–0.1)
BASOPHILS NFR BLD AUTO: 0.3 %
BNP SERPL-MCNC: 82 PG/ML (ref 0–99)
BUN SERPL-MCNC: 14 MG/DL (ref 6–23)
CALCIUM SERPL-MCNC: 10.2 MG/DL (ref 8.6–10.3)
CARDIAC TROPONIN I PNL SERPL HS: 7 NG/L (ref 0–13)
CARDIAC TROPONIN I PNL SERPL HS: 8 NG/L (ref 0–13)
CHLORIDE SERPL-SCNC: 95 MMOL/L (ref 98–107)
CO2 SERPL-SCNC: 32 MMOL/L (ref 21–32)
CREAT SERPL-MCNC: 0.82 MG/DL (ref 0.5–1.05)
EGFRCR SERPLBLD CKD-EPI 2021: 75 ML/MIN/1.73M*2
EOSINOPHIL # BLD AUTO: 0.02 X10*3/UL (ref 0–0.4)
EOSINOPHIL NFR BLD AUTO: 0.2 %
ERYTHROCYTE [DISTWIDTH] IN BLOOD BY AUTOMATED COUNT: 12.7 % (ref 11.5–14.5)
GLUCOSE SERPL-MCNC: 140 MG/DL (ref 74–99)
HCT VFR BLD AUTO: 45.8 % (ref 36–46)
HGB BLD-MCNC: 14.7 G/DL (ref 12–16)
IMM GRANULOCYTES # BLD AUTO: 0.03 X10*3/UL (ref 0–0.5)
IMM GRANULOCYTES NFR BLD AUTO: 0.3 % (ref 0–0.9)
LYMPHOCYTES # BLD AUTO: 0.66 X10*3/UL (ref 0.8–3)
LYMPHOCYTES NFR BLD AUTO: 6 %
MAGNESIUM SERPL-MCNC: 1.79 MG/DL (ref 1.6–2.4)
MCH RBC QN AUTO: 29.4 PG (ref 26–34)
MCHC RBC AUTO-ENTMCNC: 32.1 G/DL (ref 32–36)
MCV RBC AUTO: 92 FL (ref 80–100)
MONOCYTES # BLD AUTO: 0.09 X10*3/UL (ref 0.05–0.8)
MONOCYTES NFR BLD AUTO: 0.8 %
NEUTROPHILS # BLD AUTO: 10.15 X10*3/UL (ref 1.6–5.5)
NEUTROPHILS NFR BLD AUTO: 92.4 %
NRBC BLD-RTO: 0 /100 WBCS (ref 0–0)
PLATELET # BLD AUTO: 161 X10*3/UL (ref 150–450)
POC RAPID INFLUENZA A: NEGATIVE
POC RAPID INFLUENZA B: NEGATIVE
POC RSV PCR: NOT DETECTED
POC SARS-COV-2 AG BINAX: NORMAL
POTASSIUM SERPL-SCNC: 3.6 MMOL/L (ref 3.5–5.3)
RBC # BLD AUTO: 5 X10*6/UL (ref 4–5.2)
SODIUM SERPL-SCNC: 139 MMOL/L (ref 136–145)
WBC # BLD AUTO: 11 X10*3/UL (ref 4.4–11.3)

## 2025-01-30 PROCEDURE — 93005 ELECTROCARDIOGRAM TRACING: CPT

## 2025-01-30 PROCEDURE — 99285 EMERGENCY DEPT VISIT HI MDM: CPT | Mod: 25 | Performed by: STUDENT IN AN ORGANIZED HEALTH CARE EDUCATION/TRAINING PROGRAM

## 2025-01-30 PROCEDURE — 83880 ASSAY OF NATRIURETIC PEPTIDE: CPT | Performed by: STUDENT IN AN ORGANIZED HEALTH CARE EDUCATION/TRAINING PROGRAM

## 2025-01-30 PROCEDURE — 2550000001 HC RX 255 CONTRASTS: Performed by: STUDENT IN AN ORGANIZED HEALTH CARE EDUCATION/TRAINING PROGRAM

## 2025-01-30 PROCEDURE — 80048 BASIC METABOLIC PNL TOTAL CA: CPT | Performed by: STUDENT IN AN ORGANIZED HEALTH CARE EDUCATION/TRAINING PROGRAM

## 2025-01-30 PROCEDURE — 2500000002 HC RX 250 W HCPCS SELF ADMINISTERED DRUGS (ALT 637 FOR MEDICARE OP, ALT 636 FOR OP/ED): Mod: MUE | Performed by: STUDENT IN AN ORGANIZED HEALTH CARE EDUCATION/TRAINING PROGRAM

## 2025-01-30 PROCEDURE — 36415 COLL VENOUS BLD VENIPUNCTURE: CPT | Performed by: STUDENT IN AN ORGANIZED HEALTH CARE EDUCATION/TRAINING PROGRAM

## 2025-01-30 PROCEDURE — 2500000001 HC RX 250 WO HCPCS SELF ADMINISTERED DRUGS (ALT 637 FOR MEDICARE OP): Performed by: STUDENT IN AN ORGANIZED HEALTH CARE EDUCATION/TRAINING PROGRAM

## 2025-01-30 PROCEDURE — 71275 CT ANGIOGRAPHY CHEST: CPT

## 2025-01-30 PROCEDURE — 84484 ASSAY OF TROPONIN QUANT: CPT | Performed by: STUDENT IN AN ORGANIZED HEALTH CARE EDUCATION/TRAINING PROGRAM

## 2025-01-30 PROCEDURE — 85025 COMPLETE CBC W/AUTO DIFF WBC: CPT | Performed by: STUDENT IN AN ORGANIZED HEALTH CARE EDUCATION/TRAINING PROGRAM

## 2025-01-30 PROCEDURE — 71275 CT ANGIOGRAPHY CHEST: CPT | Performed by: RADIOLOGY

## 2025-01-30 PROCEDURE — 83735 ASSAY OF MAGNESIUM: CPT | Performed by: STUDENT IN AN ORGANIZED HEALTH CARE EDUCATION/TRAINING PROGRAM

## 2025-01-30 RX ORDER — AZITHROMYCIN 500 MG/1
500 TABLET, FILM COATED ORAL ONCE
Status: COMPLETED | OUTPATIENT
Start: 2025-01-30 | End: 2025-01-30

## 2025-01-30 RX ORDER — ALLOPURINOL 100 MG/1
100 TABLET ORAL DAILY
COMMUNITY

## 2025-01-30 RX ORDER — AZITHROMYCIN 250 MG/1
250 TABLET, FILM COATED ORAL DAILY
Qty: 4 TABLET | Refills: 0 | Status: SHIPPED | OUTPATIENT
Start: 2025-01-31 | End: 2025-02-04

## 2025-01-30 RX ORDER — METHYLPREDNISOLONE SODIUM SUCCINATE 125 MG/2ML
125 INJECTION INTRAMUSCULAR; INTRAVENOUS ONCE
Status: COMPLETED | OUTPATIENT
Start: 2025-01-30 | End: 2025-01-30

## 2025-01-30 RX ORDER — LEVOTHYROXINE SODIUM 125 UG/1
125 TABLET ORAL DAILY
COMMUNITY

## 2025-01-30 RX ORDER — IPRATROPIUM BROMIDE AND ALBUTEROL SULFATE 2.5; .5 MG/3ML; MG/3ML
3 SOLUTION RESPIRATORY (INHALATION) ONCE
Status: COMPLETED | OUTPATIENT
Start: 2025-01-30 | End: 2025-01-30

## 2025-01-30 RX ORDER — PREDNISONE 20 MG/1
60 TABLET ORAL DAILY
Qty: 12 TABLET | Refills: 0 | Status: SHIPPED | OUTPATIENT
Start: 2025-01-31 | End: 2025-02-04

## 2025-01-30 RX ADMIN — AZITHROMYCIN DIHYDRATE 500 MG: 500 TABLET ORAL at 14:42

## 2025-01-30 RX ADMIN — IOHEXOL 75 ML: 350 INJECTION, SOLUTION INTRAVENOUS at 15:32

## 2025-01-30 RX ADMIN — IPRATROPIUM BROMIDE AND ALBUTEROL SULFATE 3 ML: 2.5; .5 SOLUTION RESPIRATORY (INHALATION) at 14:42

## 2025-01-30 RX ADMIN — IPRATROPIUM BROMIDE AND ALBUTEROL SULFATE 3 ML: 2.5; .5 SOLUTION RESPIRATORY (INHALATION) at 11:31

## 2025-01-30 RX ADMIN — METHYLPREDNISOLONE SODIUM SUCCINATE 125 MG: 125 INJECTION INTRAMUSCULAR; INTRAVENOUS at 11:32

## 2025-01-30 RX ADMIN — Medication 2 L/MIN: at 11:33

## 2025-01-30 ASSESSMENT — ENCOUNTER SYMPTOMS
CARDIOVASCULAR NEGATIVE: 1
WHEEZING: 1
SHORTNESS OF BREATH: 1
APPETITE CHANGE: 1
ACTIVITY CHANGE: 1
DIZZINESS: 1
COUGH: 1

## 2025-01-30 ASSESSMENT — COLUMBIA-SUICIDE SEVERITY RATING SCALE - C-SSRS
6. HAVE YOU EVER DONE ANYTHING, STARTED TO DO ANYTHING, OR PREPARED TO DO ANYTHING TO END YOUR LIFE?: NO
1. IN THE PAST MONTH, HAVE YOU WISHED YOU WERE DEAD OR WISHED YOU COULD GO TO SLEEP AND NOT WAKE UP?: NO
2. HAVE YOU ACTUALLY HAD ANY THOUGHTS OF KILLING YOURSELF?: NO

## 2025-01-30 ASSESSMENT — PAIN DESCRIPTION - ORIENTATION: ORIENTATION: RIGHT;LEFT

## 2025-01-30 ASSESSMENT — PAIN DESCRIPTION - PAIN TYPE: TYPE: ACUTE PAIN

## 2025-01-30 ASSESSMENT — PAIN DESCRIPTION - FREQUENCY: FREQUENCY: CONSTANT/CONTINUOUS

## 2025-01-30 ASSESSMENT — PAIN SCALES - GENERAL
PAINLEVEL_OUTOF10: 0 - NO PAIN
PAINLEVEL_OUTOF10: 3

## 2025-01-30 ASSESSMENT — PAIN - FUNCTIONAL ASSESSMENT: PAIN_FUNCTIONAL_ASSESSMENT: 0-10

## 2025-01-30 ASSESSMENT — PAIN DESCRIPTION - DESCRIPTORS: DESCRIPTORS: HEAVINESS

## 2025-01-30 ASSESSMENT — PAIN DESCRIPTION - LOCATION: LOCATION: CHEST

## 2025-01-30 NOTE — ED PROVIDER NOTES
Emergency Department Provider Note        History of Present Illness     Chief Complaint: Shortness of Breath   History provided by: Patient  Limitations to History: None  External Chart Review: See ED Course    HPI:  Breonna Greenberg is a 74 y.o. female with PMHx of COPD on nocturnal O2, lung CA s/p resection and chemo presenting to the ED for shortness of breath.  Patient reports over the past 3 days she has had increased dyspnea at rest as well as with exertion.  She endorses associated cough with increased sputum production and has been using her home inhalers more often than usual with some, but not complete relief.  She denies chest pain, orthopnea, PND, lower extremity edema.  She reports that she was recently at a  and around many people who may have been sick.  Was seen at urgent care prior to arrival in the ED and was given a DuoNeb and dose of Solu-Medrol.    Physical Exam   Triage vitals:  T 36.2 °C (97.2 °F)  HR 74  /88  RR 20  O2 94 % None (Room air)    Constitutional: Awake, alert, not in acute distress  HENT: Head atraumatic, mucous membranes moist  Eyes:  Conjunctivae normal  Neck:  Supple  Lungs: breath sounds equal and symmetric, mild wheezing bilaterally, no tachypnea or signs of respiratory distress  Heart: Regular rate and rhythm, no murmur, rub or gallop  Abdomen: Soft, nontender, nondistended, no rebound or guarding  Extremities: No lower extremity edema  Neuro: Alert, no focal deficit  Skin: Warm, dry  Psych: Calm, cooperative       Medical Decision Making & ED Course   Medical Decision Making:  Breonna Greenberg is a 74 y.o. female with PMHx as above in HPI who presented to the ED for SOB. Patient was afebrile, hemodynamically stable, and satting on room air on arrival.     Exam as above.  Patient well-appearing, no signs of respiratory distress.    Clinical course as below in ED course:  ED Course as of 25 183   Thu 2025   2268 External chart review:  Urgent  care office visit today  Given duoneb and solumedrol     [SS]   1600 CBC and Auto Differential(!)  No leukocytosis, anemia, or thrombocytopenia   [SS]   1601 Basic metabolic panel(!)  No clinically significant electrolyte abnormalities, no MILTON   [SS]   1601 Magnesium  normal [SS]   1601 Troponin I Series, High Sensitivity (0, 1 HR)  Not significantly elevated will trend per protocol  [SS]   1602 B-type natriuretic peptide  Not elevated doubt CHF [SS]   1602 ECG 12 lead  I have personally reviewed and interpreted this EKG.  Normal sinus rhythm, rate 70 BPM  Normal axis  WY interval and QRS duration within normal limits.  QTc within normal limits.  TWI III seen previously  No acute ST segment changes  When compared to prior Nov 2024 unchanged   [SS]   1642 CT angio chest for pulmonary embolism  I have personally reviewed and interpreted this CTPE, which shows no large PE. Final decision making pending radiology read.     [SS]   1643 Troponin I Series, High Sensitivity (0, 1 HR)  Stable doubt ACS/CARLY [SS]   1717 CT angio chest for pulmonary embolism  Radiology read:  IMPRESSION:  1. No evidence of acute pulmonary embolism.  2. Abnormal esophagus which appears to be distended and filled with  fluid and air-fluid level. An esophageal pathology and  gastroesophageal reflux should be considered. Recommend nonemergent  esophagram and or EGD to further evaluate.  3. Hyperinflation with findings of likely smaller right disease as  described and similar to the prior exam.   [SS]      ED Course User Index  [SS] Steffen Simerlink, MD         Diagnoses as of 01/30/25 1834   COPD exacerbation (Multi)   History of lung cancer       A/P:  EKG and labs are reassuring as above.  CT PE shows no evidence of PE, no pneumonia, pneumothorax, pulmonary edema, or pleural effusion either.  No evidence of new oncologic disease.  Suspect exacerbation of COPD.  She was given a DuoNeb, had already received steroids at urgent care.  Was  additionally given azithromycin given cough with increased sputum production.  On reevaluation she endorses symptomatic improvement.  She reports that with ambulation her shortness of breath is now back to baseline.  While her sats are in the low 90s this is appropriate given her history of COPD and she requested discharge home which is reasonable.  She was discharged home in stable condition with plan for steroid burst and course of azithromycin.  She will follow-up with her pulmonologist.  ------------------------------------------------  Independent Test Interpretation: See ED Course    Disposition   As a result of the work-up, the patient was discharged home.  she was informed of her diagnosis and instructed to come back with any concerns or worsening of condition.  she and was agreeable to the plan as discussed above.  she was given the opportunity to ask questions.  All of the patient's questions were answered.    ED Prescriptions       Medication Sig Dispense Start Date End Date Auth. Provider    predniSONE (Deltasone) 20 mg tablet Take 3 tablets (60 mg) by mouth once daily for 4 days. Do not fill before January 31, 2025. 12 tablet 1/31/2025 2/4/2025 Steffen Simerlink, MD    azithromycin (Zithromax) 250 mg tablet Take 1 tablet (250 mg) by mouth once daily for 4 days. Do not fill before January 31, 2025. 4 tablet 1/31/2025 2/4/2025 Steffen Simerlink, MD            Procedures   Procedures    Steffen Simerlink, MD Steffen Simerlink, MD  01/30/25 7311

## 2025-01-30 NOTE — TELEPHONE ENCOUNTER
Patient sts she suspects she has pneumonia. She went to the urgent care and they weren't able to conduct a chest x-ray. Patient sts she was told to go the E.    However patient sts she does not want to go to the ER and wants to know if you have an alternative solution. Please advise

## 2025-01-30 NOTE — ED TRIAGE NOTES
Patient c/o worsening SOB over the past few days. Pt states chest heaviness. Pt denies n/v/d. Pt alert and oriented x 4.

## 2025-01-30 NOTE — PATIENT INSTRUCTIONS
You need to be evaluated in ED for more work ups such as Blood works and Chest X ray and possible In patient treatment.  Your friend agreed to drive you to ED, since you are a  patient, please go to a  ED now.  Negative COVID, Flu and RSV test results.  There is no X ray available in our facility today.

## 2025-01-30 NOTE — PROGRESS NOTES
"Subjective   Patient ID: Breonna Greenberg \"Millicent" is a 74 y.o. female. They present today with a chief complaint of Cough, Wheezing, and Dizziness (X 3 days).    History of Present Illness    Cough  Associated symptoms include shortness of breath and wheezing.   Wheezing  Associated symptoms: cough and shortness of breath    Dizziness  Associated symptoms: shortness of breath        Past Medical History  Allergies as of 2025    (No Known Allergies)       (Not in a hospital admission)       Past Medical History:   Diagnosis Date    Acute sinusitis 10/11/2023    Urinary tract infection 10/11/2023       Past Surgical History:   Procedure Laterality Date    BACK SURGERY  2013    Back Surgery     SECTION, CLASSIC  2013     Section    CT ANGIO CORONARY ART WITH HEARTFLOW IF SCORE >30%  2022    CT HEART CORONARY ANGIOGRAM 2022 [facility] ANCILLARY LEGACY    MR HEAD ANGIO WO IV CONTRAST  3/14/2018    MR HEAD ANGIO WO IV CONTRAST 3/14/2018 [facility] ANCILLARY LEGACY    OTHER SURGICAL HISTORY  2020    Mediastinoscopy    OTHER SURGICAL HISTORY  2020    Thoracotomy    OTHER SURGICAL HISTORY  2020    Lung lobectomy        reports that she has quit smoking. Her smoking use included cigarettes. She has a 75 pack-year smoking history. She has never used smokeless tobacco. She reports that she does not drink alcohol and does not use drugs.    Review of Systems  Review of Systems   Constitutional:  Positive for activity change and appetite change.   HENT: Negative.     Respiratory:  Positive for cough, shortness of breath and wheezing.    Cardiovascular: Negative.    Neurological:  Positive for dizziness.                                  Objective    Vitals:    25 1023   BP: 116/80   Pulse: 73   Resp: 14   Temp: 36.4 °C (97.5 °F)   SpO2: 93%   Weight: 70.3 kg (155 lb)     No LMP recorded. Patient is postmenopausal.    Physical Exam  Constitutional:       Appearance: Normal " appearance.   Cardiovascular:      Rate and Rhythm: Normal rate and regular rhythm.   Pulmonary:      Effort: Tachypnea present.      Breath sounds: Examination of the right-upper field reveals decreased breath sounds. Examination of the right-middle field reveals decreased breath sounds. Examination of the left-middle field reveals rhonchi. Examination of the right-lower field reveals decreased breath sounds. Examination of the left-lower field reveals rhonchi. Decreased breath sounds and rhonchi present.   Neurological:      Mental Status: She is alert.         Procedures    Point of Care Test & Imaging Results from this visit  No results found for this visit on 01/30/25.   No results found.    Diagnostic study results (if any) were reviewed by Lori Bolivar MD.    Assessment/Plan   Allergies, medications, history, and pertinent labs/EKGs/Imaging reviewed by Lori Bolivar MD.     Medical Decision Making      Orders and Diagnoses  Diagnoses and all orders for this visit:  Bronchitis, chronic obstructive w acute bronchitis (Multi)  Shortness of breath      Medical Admin Record      Patient disposition: ED    Electronically signed by Lori Bolivar MD  10:57 AM

## 2025-01-30 NOTE — DISCHARGE INSTRUCTIONS
Take all antibiotics until gone.  Do not share your medication with anyone.    Respiratory instructions: Return to the ED if you have worsening shortness of breath, increased fever, coughing up blood, new or worsening chest pain or your symptoms get worse.

## 2025-01-31 LAB
ATRIAL RATE: 70 BPM
P AXIS: 73 DEGREES
P OFFSET: 212 MS
P ONSET: 161 MS
PR INTERVAL: 132 MS
Q ONSET: 227 MS
QRS COUNT: 12 BEATS
QRS DURATION: 82 MS
QT INTERVAL: 430 MS
QTC CALCULATION(BAZETT): 464 MS
QTC FREDERICIA: 452 MS
R AXIS: 14 DEGREES
T AXIS: 20 DEGREES
T OFFSET: 442 MS
VENTRICULAR RATE: 70 BPM

## 2025-02-06 ENCOUNTER — APPOINTMENT (OUTPATIENT)
Dept: PRIMARY CARE | Facility: CLINIC | Age: 75
End: 2025-02-06
Payer: COMMERCIAL

## 2025-02-06 VITALS
OXYGEN SATURATION: 91 % | TEMPERATURE: 97.1 F | DIASTOLIC BLOOD PRESSURE: 62 MMHG | WEIGHT: 156.4 LBS | BODY MASS INDEX: 30.7 KG/M2 | HEIGHT: 60 IN | SYSTOLIC BLOOD PRESSURE: 102 MMHG | HEART RATE: 82 BPM

## 2025-02-06 DIAGNOSIS — I25.10 CORONARY ARTERIOSCLEROSIS: ICD-10-CM

## 2025-02-06 DIAGNOSIS — Z12.31 SCREENING MAMMOGRAM FOR BREAST CANCER: ICD-10-CM

## 2025-02-06 DIAGNOSIS — F33.9 EPISODE OF RECURRENT MAJOR DEPRESSIVE DISORDER, UNSPECIFIED DEPRESSION EPISODE SEVERITY (CMS-HCC): ICD-10-CM

## 2025-02-06 DIAGNOSIS — K22.4 ESOPHAGEAL DYSMOTILITY: ICD-10-CM

## 2025-02-06 DIAGNOSIS — M81.0 OSTEOPOROSIS, UNSPECIFIED OSTEOPOROSIS TYPE, UNSPECIFIED PATHOLOGICAL FRACTURE PRESENCE: ICD-10-CM

## 2025-02-06 DIAGNOSIS — J43.9 PULMONARY EMPHYSEMA, UNSPECIFIED EMPHYSEMA TYPE (MULTI): ICD-10-CM

## 2025-02-06 DIAGNOSIS — Z71.1 CONCERN ABOUT MEMORY: ICD-10-CM

## 2025-02-06 DIAGNOSIS — E03.9 HYPOTHYROIDISM, UNSPECIFIED TYPE: ICD-10-CM

## 2025-02-06 DIAGNOSIS — Q24.8 LEFT VENTRICULAR OUTFLOW TRACT OBSTRUCTION (HHS-HCC): ICD-10-CM

## 2025-02-06 DIAGNOSIS — R41.3 MEMORY DEFICIT: ICD-10-CM

## 2025-02-06 DIAGNOSIS — J44.9 CHRONIC OBSTRUCTIVE PULMONARY DISEASE, UNSPECIFIED COPD TYPE (MULTI): ICD-10-CM

## 2025-02-06 DIAGNOSIS — M10.9 GOUT, UNSPECIFIED CAUSE, UNSPECIFIED CHRONICITY, UNSPECIFIED SITE: ICD-10-CM

## 2025-02-06 DIAGNOSIS — L30.9 DERMATITIS: ICD-10-CM

## 2025-02-06 DIAGNOSIS — Z00.00 ENCOUNTER FOR ROUTINE ADULT HEALTH EXAMINATION WITHOUT ABNORMAL FINDINGS: ICD-10-CM

## 2025-02-06 DIAGNOSIS — E78.49 OTHER HYPERLIPIDEMIA: ICD-10-CM

## 2025-02-06 DIAGNOSIS — Z00.00 ENCOUNTER FOR PREVENTATIVE ADULT HEALTH CARE EXAMINATION: ICD-10-CM

## 2025-02-06 DIAGNOSIS — Z00.00 ROUTINE GENERAL MEDICAL EXAMINATION AT HEALTH CARE FACILITY: Primary | ICD-10-CM

## 2025-02-06 DIAGNOSIS — G47.00 INSOMNIA, UNSPECIFIED TYPE: ICD-10-CM

## 2025-02-06 DIAGNOSIS — C34.91 MALIGNANT NEOPLASM OF RIGHT LUNG, UNSPECIFIED PART OF LUNG (MULTI): ICD-10-CM

## 2025-02-06 PROBLEM — G62.9 NEUROPATHY: Status: RESOLVED | Noted: 2023-10-11 | Resolved: 2025-02-06

## 2025-02-06 PROBLEM — M54.50 LOW BACK PAIN: Status: RESOLVED | Noted: 2023-10-11 | Resolved: 2025-02-06

## 2025-02-06 PROBLEM — R25.2 MUSCLE CRAMP, NOCTURNAL: Status: RESOLVED | Noted: 2023-10-11 | Resolved: 2025-02-06

## 2025-02-06 PROBLEM — G44.219 EPISODIC TENSION-TYPE HEADACHE, NOT INTRACTABLE: Status: RESOLVED | Noted: 2023-10-11 | Resolved: 2025-02-06

## 2025-02-06 PROBLEM — F41.8 DEPRESSION WITH ANXIETY: Status: RESOLVED | Noted: 2023-05-03 | Resolved: 2025-02-06

## 2025-02-06 PROBLEM — R68.89 NONSPECIFIC ABNORMAL FINDING: Status: RESOLVED | Noted: 2023-10-11 | Resolved: 2025-02-06

## 2025-02-06 PROBLEM — M72.2 PLANTAR FASCIITIS: Status: RESOLVED | Noted: 2023-10-11 | Resolved: 2025-02-06

## 2025-02-06 PROBLEM — R09.81 NASAL CONGESTION: Status: RESOLVED | Noted: 2023-10-11 | Resolved: 2025-02-06

## 2025-02-06 PROBLEM — M25.569 KNEE PAIN: Status: RESOLVED | Noted: 2023-10-11 | Resolved: 2025-02-06

## 2025-02-06 PROBLEM — R55 NEAR SYNCOPE: Status: RESOLVED | Noted: 2023-10-11 | Resolved: 2025-02-06

## 2025-02-06 PROBLEM — M54.2 NECK PAIN: Status: RESOLVED | Noted: 2023-10-11 | Resolved: 2025-02-06

## 2025-02-06 PROBLEM — F17.200 NICOTINE DEPENDENCE: Status: RESOLVED | Noted: 2023-10-11 | Resolved: 2025-02-06

## 2025-02-06 PROBLEM — M17.11 PRIMARY LOCALIZED OSTEOARTHRITIS OF RIGHT KNEE: Status: RESOLVED | Noted: 2023-10-11 | Resolved: 2025-02-06

## 2025-02-06 PROBLEM — R00.2 PALPITATION: Status: RESOLVED | Noted: 2023-10-11 | Resolved: 2025-02-06

## 2025-02-06 PROBLEM — M25.539 PAIN, WRIST JOINT: Status: RESOLVED | Noted: 2023-10-11 | Resolved: 2025-02-06

## 2025-02-06 PROCEDURE — 1170F FXNL STATUS ASSESSED: CPT | Performed by: INTERNAL MEDICINE

## 2025-02-06 PROCEDURE — 99397 PER PM REEVAL EST PAT 65+ YR: CPT | Performed by: INTERNAL MEDICINE

## 2025-02-06 PROCEDURE — 1158F ADVNC CARE PLAN TLK DOCD: CPT | Performed by: INTERNAL MEDICINE

## 2025-02-06 PROCEDURE — 1123F ACP DISCUSS/DSCN MKR DOCD: CPT | Performed by: INTERNAL MEDICINE

## 2025-02-06 PROCEDURE — 1036F TOBACCO NON-USER: CPT | Performed by: INTERNAL MEDICINE

## 2025-02-06 PROCEDURE — 1159F MED LIST DOCD IN RCRD: CPT | Performed by: INTERNAL MEDICINE

## 2025-02-06 PROCEDURE — 1157F ADVNC CARE PLAN IN RCRD: CPT | Performed by: INTERNAL MEDICINE

## 2025-02-06 PROCEDURE — 99214 OFFICE O/P EST MOD 30 MIN: CPT | Performed by: INTERNAL MEDICINE

## 2025-02-06 PROCEDURE — 1126F AMNT PAIN NOTED NONE PRSNT: CPT | Performed by: INTERNAL MEDICINE

## 2025-02-06 PROCEDURE — G0439 PPPS, SUBSEQ VISIT: HCPCS | Performed by: INTERNAL MEDICINE

## 2025-02-06 PROCEDURE — 3008F BODY MASS INDEX DOCD: CPT | Performed by: INTERNAL MEDICINE

## 2025-02-06 PROCEDURE — 1160F RVW MEDS BY RX/DR IN RCRD: CPT | Performed by: INTERNAL MEDICINE

## 2025-02-06 RX ORDER — FLUOCINONIDE TOPICAL SOLUTION USP, 0.05% 0.5 MG/ML
SOLUTION TOPICAL 2 TIMES DAILY PRN
Qty: 60 ML | Refills: 1 | Status: SHIPPED | OUTPATIENT
Start: 2025-02-06 | End: 2026-02-06

## 2025-02-06 RX ORDER — DESONIDE 0.5 MG/G
OINTMENT TOPICAL 2 TIMES DAILY
Qty: 15 G | Refills: 0 | Status: SHIPPED | OUTPATIENT
Start: 2025-02-06 | End: 2026-02-06

## 2025-02-06 ASSESSMENT — PATIENT HEALTH QUESTIONNAIRE - PHQ9
2. FEELING DOWN, DEPRESSED OR HOPELESS: NOT AT ALL
1. LITTLE INTEREST OR PLEASURE IN DOING THINGS: NOT AT ALL
SUM OF ALL RESPONSES TO PHQ9 QUESTIONS 1 AND 2: 0

## 2025-02-06 ASSESSMENT — ACTIVITIES OF DAILY LIVING (ADL)
BATHING: INDEPENDENT
DOING_HOUSEWORK: INDEPENDENT
DRESSING: INDEPENDENT
TAKING_MEDICATION: INDEPENDENT
GROCERY_SHOPPING: INDEPENDENT
MANAGING_FINANCES: INDEPENDENT

## 2025-02-06 ASSESSMENT — PAIN SCALES - GENERAL: PAINLEVEL_OUTOF10: 0-NO PAIN

## 2025-02-06 NOTE — PATIENT INSTRUCTIONS
Sylwia,   Try magnesium threonate or magnesium glycinate 400mg 1-2 hours before bed.  Mammogram - Call [phone] to have this scheduled.    Tetanus shot at pharmacy    Labs including bloodwork and/or urine is ordered to be done in 1-2 weeks. The lab can be found on this floor (2nd floor) next to the pharmacy across from the elevators.    Neuropsychology referral   Apply small amount of topical steroid to the vaginal area, see gynecology if does not improve.   I recommend Dr. Sanket Canela, Dr. Edyta Nicholas ([phone]), Dr. Martha Cabello ([phone])  or Dr. Blanca Belle ([phone]).   For rib pain - try heating pad, stretching, voltaren gel  and the magnesium.     Followup 3 months

## 2025-02-06 NOTE — ASSESSMENT & PLAN NOTE
TFTs now within normal limits on current dose of levothyroxine but long history of significant fluctuations.   Recheck levels

## 2025-02-06 NOTE — ASSESSMENT & PLAN NOTE
Some improvement in symptoms with small more frequent meals.   Recommended speech therapy   Seen by Dr. Mora if refractory may consider for surgical management.  Overall stable

## 2025-02-06 NOTE — ASSESSMENT & PLAN NOTE
Sleep onset insomnia, no response to trials of melatonin or trazodone.   Did not enjoy CBT-I option

## 2025-02-06 NOTE — ASSESSMENT & PLAN NOTE
advanced and dyspnea on exertion with hyperdynamic LV function and small LV cavity- followed by Dr. Field with recent exacerbations switched to Trelegy continuing, albuterol and nebulizers.   Continues nocturnal oxygen at 2 L  Found benefit from pulmonary rehab seen by pulmonologist recommended consideration for sarcopenia clinic will follow-up with Dr. Field.

## 2025-02-06 NOTE — ASSESSMENT & PLAN NOTE
Mini cog performed score 4/5, no lab abnormalities romulo would contribute, has had brain imaging performed in 2020 MRI.   Possible vascular etiology  Referred to neuropsychology for full extent of deficiency.

## 2025-02-06 NOTE — PROGRESS NOTES
"Subjective     Patient ID: Breonna Greenberg \"Sylwia\" is a 74 y.o. female who presents for Medicare Annual Wellness Visit Subsequent and Follow-up     74-year-old female here for annual wellness visit and follow-up of chronic conditions, last seen in July.    11/24: gabapentin 300 bid   1/25 new oxygen supplier needs prescription  Specify in notes oxygen test and results     She presented to the ED on 1/30 out of concern for shortness of breath with rest and with exertion associated with cough after being seen by urgent care workup including labs unrevealing, EKG without changes, CT PE showed no evidence of PE and no evidence of oncologic disease.  She was treated for COPD exacerbation with DuoNebs and steroids as well as azithromycin subsequently discharged.    CT angio: IMPRESSION:  1. No evidence of acute pulmonary embolism.  2. Abnormal esophagus which appears to be distended and filled with  fluid and air-fluid level. An esophageal pathology and  gastroesophageal reflux should be considered. Recommend nonemergent  esophagram and or EGD to further evaluate.  3. Hyperinflation with findings of likely smaller right disease as  described and similar to the prior exam.     Dyspnea and wheezing are now at baseline. She continues to note trouble losing weight. Had tried keto in the past.   Has been experiencing soreness at the introitus of the vagina for the last several weeks, Has not noted any discharge, only with significant tenderness when wiping herself. No bleeding or discharge. Never experienced this in tehpast.     PMHx:   - Esophageal dysphagia (solids and liquids) hiatal hernia and GERD, retained esophageal fluid and aspiration pneumonia history- Diffuse dilatation of distal esophagus and ?achalasia. Recommended smaller meals and slower foods. One regular adult meal may last 2 days for her. Despite this, she notes difficulty with losing weight.  Seen by Dr. Patel recommended consideration for surgery to remove " the lower portion of the esophagus if symptoms become worse.  On PPI  - Lung cancer (NSCLC - SCC) s/p right lung resection 2020, complicated by tumor abutting the right atrium, post op pneumonia and post operative pericarditis s/p 4 cycles of adjuvant cisplatin/docetaxel with G-CSF support completed 12/20, seen by onc Dr. Iglesias recommended surveillance every 6 months last in August [facility] 2024 and we are so excited for everything I had this year if you have  - COPD - advanced, dyspnea on exertion with hyperdynamic LV function and small LV cavity- followed by Dr. Field and now Dr. Ta switched to Trelegy from Anoro albuterol and nebulizers, using nocturnal oxygen 2 L.  Was contemplating a move to [place], recommended consideration for sarcopenia clinic  - Insomnia -no response to trazodone or melatonin recommended trial of CBT-I, did try it but only did the training for one night.   - Memory concerns - recommend consideration for neuropsych testing  - Osteoporosis - alendronate stopped 3/24 for drug holiday after 5 years  - Hypothyroidism - on levothyroxine 150 mcg takes concurrently with PPI  - Paroxysmal Afib -  precipitated by surgery followed by Dr. Carroll and Dr. Ramos.   - CAD -  LV tract outflow obstruction -  followed by cardiology on metoprolol increased to 100 by Dr. Carroll in November  - HTN - on metoprolol  mg  - HLD - on rosuvastatin 20 switch from atorvastatin, goal LDL less than 70  - Fibromyalgia (and chest wall pain) , depression and anxiety - followed by pain management on cymbalta 30mg and gabapentin 300 BID   -Gout and right knee OA -  s/p steroid injection with Dr. Cheng on  allopurinol and voltaren, seen by Dr. Ospina thought related to OA   - Urge incontinence - on oxybutynin 5mg improved  - Iron deficiency - without anemia recommended increased iron intake  - Pruritus at flexural areas - has had extensive workup in the past ultimately prescribed fluocinonide  0.05%     Social;   - Lives at home alone   - 3 children, oldest daughter a  in [place], middle daughter an  in [place] (, undergoing IVF), youngest son a  in [place]., has a friend who she speaks to twice a day who has concerns abut her memory  - No granchildren, has granddogs.   - Niece an OB/GYN at [facility]   - Started getting back involved with the senior center.   Patient Care Team:  Gloria Mitchell DO as PCP - General (Internal Medicine)  Gloria Mitchell DO as PCP - [employer] PCP  Lilli Iglesias MD as Consulting Physician (Hematology and Oncology)      Objective       Current Outpatient Medications:     albuterol 2.5 mg /3 mL (0.083 %) nebulizer solution, Take 3 mL (2.5 mg) by nebulization every 6 hours., Disp: 1080 mL, Rfl: 3    albuterol sulfate (Proair Digihaler) 90 mcg/actuation aero powdr breath act w/sensor inhaler, Inhale 2 puffs every 6 hours if needed for wheezing., Disp: , Rfl:     allopurinol (Zyloprim) 100 mg tablet, Take 1 tablet (100 mg) by mouth once daily., Disp: , Rfl:     b complex 0.4 mg tablet, Take 1 tablet by mouth once daily., Disp: , Rfl:     cholecalciferol, vitD3,/vit K2 (vitamin D3-vitamin K2) 125 mcg (5,000 unit)-100 mcg capsule, Take by mouth., Disp: , Rfl:     desonide (DesOwen) 0.05 % ointment, Apply topically 2 times a day. Apply as needed to the vaginal area, Disp: 15 g, Rfl: 0    DULoxetine (Cymbalta) 30 mg DR capsule, Take 1 capsule (30 mg) by mouth once daily., Disp: 90 capsule, Rfl: 1    fluocinonide (Lidex) 0.05 % external solution, Apply topically 2 times a day as needed for irritation or rash., Disp: 60 mL, Rfl: 1    fluticasone (Flonase) 50 mcg/actuation nasal spray, Administer 1 spray into each nostril once daily. Shake gently. Before first use, prime pump. After use, clean tip and replace cap., Disp: , Rfl:     gabapentin (Neurontin) 300 mg capsule, Take 1 capsule (300 mg) by  mouth 2 times a day., Disp: 180 capsule, Rfl: 1    levothyroxine (Synthroid, Levoxyl) 125 mcg tablet, Take 1 tablet (125 mcg) by mouth early in the morning.. Take on an empty stomach at the same time each day, either 30 to 60 minutes prior to breakfast, Disp: , Rfl:     metoprolol succinate XL (Toprol-XL) 100 mg 24 hr tablet, Take 1 tablet (100 mg) by mouth once daily., Disp: 90 tablet, Rfl: 3    omeprazole (PriLOSEC) 40 mg DR capsule, Take 1 capsule (40 mg) by mouth once daily. Do not crush or chew., Disp: 90 capsule, Rfl: 0    oxybutynin (Ditropan) 5 mg tablet, Take 1 tablet (5 mg) by mouth once daily., Disp: 90 tablet, Rfl: 0    rosuvastatin (Crestor) 20 mg tablet, Take 1 tablet (20 mg) by mouth once daily., Disp: 90 tablet, Rfl: 3    umeclidinium-vilanteroL (Anoro Ellipta) 62.5-25 mcg/actuation blister with device, Inhale 1 puff once daily., Disp: , Rfl:     Current Facility-Administered Medications:     oxygen (O2) therapy, , inhalation, Continuous - Inhalation, Lori Bolivar MD, Last Rate: 120,000 mL/hr at 01/30/25 1133, 2 L/min at 01/30/25 1133      /62 (BP Location: Left arm, Patient Position: Sitting, BP Cuff Size: Adult)   Pulse 82   Temp 36.2 °C (97.1 °F) (Temporal)   Ht 1.524 m (5')   Wt 70.9 kg (156 lb 6.4 oz)   SpO2 91%   BMI 30.54 kg/m²       Physical Examination:   General: Awake, alert, appears stated age   Head/eyes/ears: NCAT, EOMI, PERRL, TM WNL, no cerumen, hearing aids   Throat: moist mucus membranes, no pharyngeal erythema  Neck: Supple, nontender, no lymphadenopathy, thyroid exam unremarkable   Breast exam: No palpable lumps, no discharge, no noted axillary lymphadenopathy. Chaperone offered and declined  Heart: RRR, no murmurs, rubs or gallops  Lungs: Diffuse reduced breath sounds no audible wheezes appreciated,  Abdomen: Soft, NT/ND  Extremities: No edema, 2+ DP pulses   Skin: No concerning skin lesions on visualized skin   Neuro: AAO x 3, no FND, gait unremarkable  Musk:  Reproducible tenderness overlying the right lateral rib, no overlying skin changes no step-off sign.  Chaperone offered and declined.  Pelvic examination reveals normal external genitalia, no lesions or discharge      Assessment/Plan           Assessment/Plan   Problem List Items Addressed This Visit       COPD (chronic obstructive pulmonary disease) (Multi)    Current Assessment & Plan     Followed by Dr. Field  maintained on Trelegy and albuterol.          Gout    Current Assessment & Plan     On allopurinol 100mg which is controlling her gouty flares, none recent.           Relevant Orders    Uric acid    Hypothyroidism    Current Assessment & Plan     TFTs now within normal limits on current dose of levothyroxine but long history of significant fluctuations.   Recheck levels         Relevant Orders    TSH with reflex to Free T4 if abnormal    Hepatic function panel    Insomnia    Current Assessment & Plan      Sleep onset insomnia, no response to trials of melatonin or trazodone.   Did not enjoy CBT-I option         Left ventricular outflow tract obstruction (HHS-HCC)    Lung cancer (Multi)    Osteoporosis    Current Assessment & Plan     Continue drug holiday started at 3/24  DEXA next year         Relevant Orders    Vitamin D 25-Hydroxy,Total (for eval of Vitamin D levels)    Coronary arteriosclerosis    Relevant Orders    Lipid Panel    Pulmonary emphysema (Multi)    Overview     Last Assessment & Plan: Formatting of this note might be different from the original. Assessment: stable on Anoro         Current Assessment & Plan     advanced and dyspnea on exertion with hyperdynamic LV function and small LV cavity- followed by Dr. Field with recent exacerbations switched to Trelegy continuing, albuterol and nebulizers.   Continues nocturnal oxygen at 2 L  Found benefit from pulmonary rehab seen by pulmonologist recommended consideration for sarcopenia clinic will follow-up with Dr. Field.          Other hyperlipidemia    Overview     Last Assessment & Plan: Formatting of this note might be different from the original. Assessment: on rx         Current Assessment & Plan     Continue aggressive ASCVD risk reduction, on rosuvastatin 20 mg switch from atorvastatin         Recurrent major depressive disorder (CMS-HCC)    Current Assessment & Plan     On Cymbalta 30 mg working well         Esophageal dysmotility    Current Assessment & Plan     Some improvement in symptoms with small more frequent meals.   Recommended speech therapy   Seen by Dr. Mora if refractory may consider for surgical management.  Overall stable         Memory deficit    Current Assessment & Plan     Mini cog performed score 4/5, no lab abnormalities romulo would contribute, has had brain imaging performed in 2020 MRI.   Possible vascular etiology  Referred to neuropsychology for full extent of deficiency.          Other Visit Diagnoses       Routine general medical examination at health care facility    -  Primary    Relevant Orders    1 Year Follow Up In [facility] - PCP - Wellness Exam    Dermatitis        Relevant Medications    fluocinonide (Lidex) 0.05 % external solution    desonide (DesOwen) 0.05 % ointment    Screening mammogram for breast cancer        Relevant Orders    BI mammo bilateral screening tomosynthesis    Concern about memory        Relevant Orders    Vitamin B12    Referral to Adult Neuropsychology    Right rib pain   -No overt findings appreciated physical examination had recent CT showing no evidence of bony changes.  Continue to monitor for now consider Voltaren gel and supportive measures.  Will notify me if symptoms fail to improve    Encounter for preventative adult health care examination        Encounter for routine adult health examination without abnormal findings       Adult Health Examination  Age appropriate screening performed   Healthy lifestyle reviewed.   Depression screen negative   No  additional pertinent family history or toxic habits   Cancer screening:   - Colonoscopy 3/23 diverticulosis, internal hemorrhoids no specimens collected  - Mammogram 5/23 prefers every other year ordered for repeat  - Pap smear N/A  - Skin cancer prevention strategies reviewed, does not see derm, no abnormal moles.   Immunizations   - Due: Tdap at pharmacy   - UTD: flu, COVID, RSV, pneumococcal, shingrix  Dental and visual examinations   DEXA 1/24  Discussed adequate Vitamin D intake          Follow-up 3 months

## 2025-02-10 ENCOUNTER — APPOINTMENT (OUTPATIENT)
Dept: PULMONOLOGY | Facility: CLINIC | Age: 75
End: 2025-02-10
Payer: MEDICARE

## 2025-02-10 ENCOUNTER — OFFICE VISIT (OUTPATIENT)
Dept: PULMONOLOGY | Facility: CLINIC | Age: 75
End: 2025-02-10
Payer: COMMERCIAL

## 2025-02-10 VITALS
SYSTOLIC BLOOD PRESSURE: 118 MMHG | DIASTOLIC BLOOD PRESSURE: 75 MMHG | WEIGHT: 158 LBS | OXYGEN SATURATION: 96 % | RESPIRATION RATE: 18 BRPM | TEMPERATURE: 97.1 F | BODY MASS INDEX: 30.86 KG/M2 | HEART RATE: 60 BPM

## 2025-02-10 DIAGNOSIS — J44.9 CHRONIC OBSTRUCTIVE PULMONARY DISEASE, UNSPECIFIED COPD TYPE (MULTI): Primary | ICD-10-CM

## 2025-02-10 PROCEDURE — 1157F ADVNC CARE PLAN IN RCRD: CPT | Performed by: INTERNAL MEDICINE

## 2025-02-10 PROCEDURE — 1159F MED LIST DOCD IN RCRD: CPT | Performed by: INTERNAL MEDICINE

## 2025-02-10 PROCEDURE — 99214 OFFICE O/P EST MOD 30 MIN: CPT | Performed by: INTERNAL MEDICINE

## 2025-02-10 RX ORDER — ALBUTEROL SULFATE 0.83 MG/ML
3 SOLUTION RESPIRATORY (INHALATION) ONCE
OUTPATIENT
Start: 2025-02-10 | End: 2025-02-10

## 2025-02-10 RX ORDER — ALBUTEROL SULFATE 90 UG/1
1 INHALANT RESPIRATORY (INHALATION) ONCE
OUTPATIENT
Start: 2025-02-10

## 2025-02-10 ASSESSMENT — ENCOUNTER SYMPTOMS
FREQUENCY: 0
EYE DISCHARGE: 0
SINUS PRESSURE: 0
EYE REDNESS: 0
BRUISES/BLEEDS EASILY: 0
JOINT SWELLING: 0
TREMORS: 0
SPEECH DIFFICULTY: 0
SHORTNESS OF BREATH: 1
DYSURIA: 0
APNEA: 0
SINUS PAIN: 0
SLEEP DISTURBANCE: 0
HEMATURIA: 0
CONSTIPATION: 0
DIFFICULTY URINATING: 0
CHOKING: 0
PALPITATIONS: 0
AGITATION: 0
ABDOMINAL DISTENTION: 0
ABDOMINAL PAIN: 0
NAUSEA: 0
ADENOPATHY: 0
FACIAL SWELLING: 0
WEAKNESS: 0
FATIGUE: 0
DIZZINESS: 0
NUMBNESS: 0
RHINORRHEA: 0
STRIDOR: 0
ARTHRALGIAS: 0
COUGH: 1
HEADACHES: 0
FEVER: 0
NERVOUS/ANXIOUS: 0
LIGHT-HEADEDNESS: 0
UNEXPECTED WEIGHT CHANGE: 0
WHEEZING: 0

## 2025-02-10 NOTE — PROGRESS NOTES
@PULMONARY FOLLOW-UP@       PROBLEM: COPD with shortness of breath    ASSESSMENT:  The patient is a 74-year with a history of COPD, hypothyroidism, lung cancer with a hyperdynamic left ventricle with outflow obstruction. 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  There has been no evidence of recurrence and her main issue of late has been aspiration probably from achalasia.  Of late, she has not had any pneumonia but is complaining of shortness of breath.  She has moderate to severe airflow obstruction with an FEV1 of around 49% predicted or 0.90 L and an FEV1/FVC ratio 46%.  Her DLCO was 48% predicted but she did not desaturate in the past with exercise.  Probably rechecking a pulm function tests are in order.  Currently she is on Anoro.  She has had no pulm embolization on her recent CT angiogram.  There is no evidence of aspiration but she has a dilated esophagus and is going to see GI.    PLAN:  I am going to obtain complete PFTs and a follow-up 6-minute walk to see if there is any deterioration of the last year to account for her dyspnea.      HISTORY OF PRESENT ILLNESS:  This patient is 73-year-old with COPD and hypothyroidism with a history of lung cancer who was seen on March 30, 2022 elevated February of Complaining of shortness of breath over the last 4 months. After her right lower lobectomy she had atrial flutter and Pseudomonas pneumonia. She had the onset of shortness of breath over the previous 4 months without a definite exacerbation of COPD. Her PFTs performed on March 11, 2022 revealed a severely reduced DLCO but unchanged with a moderate degree of unchanged airflow obstruction. Her echocardiogram was unchanged with a mild elevation of her RVSP. With the relatively recent onset of shortness of breath I checked a D-dimer which was elevated. CFT angiogram revealed no PE on March 11, 2022. There was evidence of the previous right  lower lobectomy. She was going to see cardiology for completeness.      She ended up having a repeat CT angiogram in May 26, 2022 which revealed no PE. There was volume loss within the lingula which was new compared to the previous study. Also there was dilated esophagus with air-fluid levels and thickening distally associated with small hiatal hernia. Overall appearance similar to the recent prior. She did undergo an EGD on June 29, 2022. There is no evidence of any gastritis or H. pylori.           The CT scan from November 11, 2022 revealed the following: There has been interval clearing of previously seen sub-centimeter ground-glass centrilobular nodules in the mid aspect right upper lobe. However more inferiorly in the right upper lobe there has been interval development of peripheral predominant patchy ground-glass opacities and pleural based nodules (series 205 images 191-211). A few additional small peripheral ground-glass opacities in the right upper lobe (image 154/309), left upper lobe anterior segment (image 116/309), and left lower lobe along the major fissure (image 113/309) are new from prior. There has been an interval decrease in atelectasis in the lingula of the left upper lobe. There is mild-to-moderate apical predominant centrilobular emphysema.   As noted above her PFTs reveal moderate airflow obstruction with a significant bronchodilator response but because of her dyspnea she ended up seeing Dr. Carroll. It was felt that she had a hyperdynamic obstruction with a normal ejection fraction. Because of the outflow obstruction she was placed on metoprolol.      She was last seen on November 21, 2022 and stated that about a month ago she developed increasing shortness of breath and congestion and was seen by a home care service and placed on prednisone and antibiotics. She got better initially but then had increasing shortness of breath with thick phlegm production. She ended up having the CT scan  as outlined above on November 11, 2022 revealing the groundglass changes. Her emergency room assessment for COVID was negative. She continues to have thick phlegm production and has tried Mucinex.  It was felt that the groundglass change could be related to a postviral illness.  Also potentially beta-blocker was potentiating her COPD.  I gave her a prednisone taper and albuterol nebulization.  A follow-up CT scan was to be obtained in 3 months..  She also had a COPD flare as was reported on December 20, 2022.     A follow-up CT scan from November 12, 2022 outlined the clearing of some parenchymal changes on the previous study but there were new opacities in other areas with the finding suggesting waxing waning of infectious or inflammatory process.  A follow-up study from 8/15/2023 outlined no suspicious nodularity.     She recently saw Dr. Carroll on September 21, 2023 and described increasing shortness of breath.  He had noted a hyperdynamic echocardiogram in 2022 and she was having some palpitation.  A repeat echocardiogram was to be obtained along with a monitor and she was continued on her beta-blocker.  Surrounding that visit she noted shortness of breath walking 20-30 steps.  She also had some nasal discharge.  Furthermore she saw GI on September 22, 2023 for esophageal reflux.     It was noted that she was admitted to the hospital October 4 through October 5, 2023 with increasing shortness of breath.  She was found to have a new 2.4 x 2.2 cm masslike opacity.  Her COPD was treated with corticosteroids and bronchodilators.  The echocardiogram from October 12, 2023 outlined hyperdynamic ventricle with ejection fraction of 77% with diastolic dysfunction.  The RVSP was normal.  The changes from the CT scan from October 4, 2023 during the hospitalization appeared postinflammatory in nature and she was placed on Augmentin as outlined by Dr. Iglesias.  There were also some additional areas of nodularity which had  increased inferiorly in the right lung and the left upper lobe     October 24, 2023 it was reported that the patient finished the antibiotic about a week ago and this began coughing again.  She is bringing up yellow phlegm.  She has a drip in her throat and congestion.  She has low-grade fevers.  No chest pains or pressures.  She has no chills.     The esophagram from November 15, 2023 revealed the following       1.  Diffuse dilatation of distal 2/3 of the esophagus below the level  of aortic arch with absence of peristalsis, which can be seen with  scleroderma in appropriate clinical setting. A degree of achalasia  can also be considered in the differential, however there is no  significant hold-up of contrast in the esophagus.  2. Linearly oriented fine mucosal folds in the dilated esophageal  segment could be related to scleroderma, however  esophagitis/Hawkins's esophagus secondary to gastroesophageal reflux  can also be considered in the differential. Recommend endoscopic and  histopathologic correlation.  3. Small hiatal hernia.     It was noted on February 19, 2024 that pulse oximetry as outlined saturations equal less than 88% for over 4 hours.  Nocturnal oxygen ministration was ordered.  PFTs from January 22, 2024 outlined an FVC of 1.96 L 83% predicted with an FEV1 of 0.90 L 49% predicted and FEV1 percent of  59%.  The FEV1 improved 7% after bronchodilators.  The TLC was 104% predicted the RV/TLC was 110% predicted and a DLCO was 48% predicted.  Overall the FEV1 was unchanged.  On the 6-minute walk she covered 256 m and desaturated from 93% down to 91%.  The walk distance was 63% predicted.    the CT scan of the chest from February 24, 2024 revealed the following  Improved patchiness in the right lower lobe.      There is consolidation and collapse in the left upper lobe. This may  represent scarring.      Stable nodules in the left lower lobe.      Coronary artery disease.      Dilated fluid-filled  esophagus.      Her achalasia has been managed by GI and other than small meals and referral to speech therapy there are no other options.     On May 22, 2024 it was reported that the patient intermittently has been having phlegm production and she was better when humidity was low.  She did note some shortness of breath walking into the exam room.  She had some increasing cough and congestion several weeks ago but it seems to resolve.  She is eating slower and having 4 meals per day.  She has not had any choking episodes.  She has not had any fevers or chills.  She notes that the albuterol does help when she uses a nebulizer.  She is on Anoro.    I summarized how she had COPD, hypothyroidism, lung cancer with a hyperdynamic left ventricle with outflow obstruction.  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  There has been no evidence of recurrence and her main issue of late has been aspiration probably from achalasia.  For the latter there is no further medical treatment other than small meals speech therapy etc.  She has not been to the latter of late.  It should be appreciated that several weeks ago she had increasing cough and congestion and so a chest x-ray was obtained.  There is a faint infiltrate in the lingular on the lateral film.  For completeness we will put her on a course of Augmentin.  She does have an upcoming CT scan in several months.  She was given a course of Augmentin      The CT scan from August 26, 2024 revealed the following    1. Stable postoperative changes right lower lobectomy with stable  atelectasis.  2. Slight interval increase bronchial wall thickening and scattered  bilateral ground-glass opacities, likely secondary to atypical  pneumonia.  3. Interval decrease in the mixed patchy opacities in the lingula,  since prior.       She presented to the emergency room on January 30, 2025 with increasing shortness of  breath with coughing and sputum production.  She was around various individuals who have been sick having been at a .  She had no evidence of acute infection or PE and was suspected to have a flare of COPD and was treated for such.  She was given azithromycin and prednisone          The CT angiogram from 2025 revealed the following    1. No evidence of acute pulmonary embolism.  2. Abnormal esophagus which appears to be distended and filled with  fluid and air-fluid level. An esophageal pathology and  gastroesophageal reflux should be considered. Recommend nonemergent  esophagram and or EGD to further evaluate.  3. Hyperinflation with findings of likely smaller right disease as  described and similar to the prior exam.    Currently, the patient has noted chronic shortness of breath with intermittent episodes of bronchitis and congestion.  She did go to the emergency room with increasing shortness of breath couple weeks ago and no PE was found.  There were no findings of pneumonia.  She does have a dilated esophagus  and she has an upcoming appointment with GI.  She did go for a second opinion at the [facility] regarding her COPD and really nothing was changed other than suggesting going on a Trelegy inhaler instead of Anoro.  She feels that over the last year she has continued shortness of breath and it may be somewhat worse.  She Short of Breath with Activities of Daily Living.    No Known Allergies         Current Outpatient Medications:     albuterol 2.5 mg /3 mL (0.083 %) nebulizer solution, Take 3 mL (2.5 mg) by nebulization every 6 hours., Disp: 1080 mL, Rfl: 3    albuterol sulfate (Proair Digihaler) 90 mcg/actuation aero powdr breath act w/sensor inhaler, Inhale 2 puffs every 6 hours if needed for wheezing., Disp: , Rfl:     allopurinol (Zyloprim) 100 mg tablet, Take 1 tablet (100 mg) by mouth once daily., Disp: , Rfl:     b complex 0.4 mg tablet, Take 1 tablet by mouth once daily.,  Disp: , Rfl:     cholecalciferol, vitD3,/vit K2 (vitamin D3-vitamin K2) 125 mcg (5,000 unit)-100 mcg capsule, Take by mouth., Disp: , Rfl:     desonide (DesOwen) 0.05 % ointment, Apply topically 2 times a day. Apply as needed to the vaginal area, Disp: 15 g, Rfl: 0    DULoxetine (Cymbalta) 30 mg DR capsule, Take 1 capsule (30 mg) by mouth once daily., Disp: 90 capsule, Rfl: 1    fluocinonide (Lidex) 0.05 % external solution, Apply topically 2 times a day as needed for irritation or rash., Disp: 60 mL, Rfl: 1    fluticasone (Flonase) 50 mcg/actuation nasal spray, Administer 1 spray into each nostril once daily. Shake gently. Before first use, prime pump. After use, clean tip and replace cap., Disp: , Rfl:     gabapentin (Neurontin) 300 mg capsule, Take 1 capsule (300 mg) by mouth 2 times a day., Disp: 180 capsule, Rfl: 1    levothyroxine (Synthroid, Levoxyl) 125 mcg tablet, Take 1 tablet (125 mcg) by mouth early in the morning.. Take on an empty stomach at the same time each day, either 30 to 60 minutes prior to breakfast, Disp: , Rfl:     metoprolol succinate XL (Toprol-XL) 100 mg 24 hr tablet, Take 1 tablet (100 mg) by mouth once daily., Disp: 90 tablet, Rfl: 3    omeprazole (PriLOSEC) 40 mg DR capsule, Take 1 capsule (40 mg) by mouth once daily. Do not crush or chew., Disp: 90 capsule, Rfl: 0    oxybutynin (Ditropan) 5 mg tablet, Take 1 tablet (5 mg) by mouth once daily., Disp: 90 tablet, Rfl: 0    rosuvastatin (Crestor) 20 mg tablet, Take 1 tablet (20 mg) by mouth once daily., Disp: 90 tablet, Rfl: 3    umeclidinium-vilanteroL (Anoro Ellipta) 62.5-25 mcg/actuation blister with device, Inhale 1 puff once daily., Disp: , Rfl:     Current Facility-Administered Medications:     oxygen (O2) therapy, , inhalation, Continuous - Inhalation, Lori Darejeh, MD, Last Rate: 120,000 mL/hr at 01/30/25 1133, 2 L/min at 01/30/25 1133          Review of Systems   Constitutional:  Negative for fatigue, fever and unexpected  weight change.   HENT:  Negative for congestion, facial swelling, nosebleeds, postnasal drip, rhinorrhea, sinus pressure and sinus pain.    Eyes:  Negative for discharge, redness and visual disturbance.   Respiratory:  Positive for cough and shortness of breath. Negative for apnea, choking, wheezing and stridor.    Cardiovascular:  Negative for chest pain, palpitations and leg swelling.   Gastrointestinal:  Negative for abdominal distention, abdominal pain, constipation and nausea.   Endocrine: Negative for cold intolerance and heat intolerance.   Genitourinary:  Negative for difficulty urinating, dysuria, frequency and hematuria.   Musculoskeletal:  Negative for arthralgias, gait problem and joint swelling.   Allergic/Immunologic: Negative for environmental allergies, food allergies and immunocompromised state.   Neurological:  Negative for dizziness, tremors, syncope, speech difficulty, weakness, light-headedness, numbness and headaches.   Hematological:  Negative for adenopathy. Does not bruise/bleed easily.   Psychiatric/Behavioral:  Negative for agitation, behavioral problems and sleep disturbance. The patient is not nervous/anxious.         Vitals:    02/10/25 1059   BP: 118/75   Pulse: 60   Resp: 18   Temp: 36.2 °C (97.1 °F)   SpO2: 96%        Physical Exam  Vitals reviewed.   Constitutional:       Appearance: Normal appearance.   HENT:      Head: Normocephalic and atraumatic.   Eyes:      Extraocular Movements: Extraocular movements intact.   Cardiovascular:      Rate and Rhythm: Normal rate and regular rhythm.      Heart sounds: No murmur heard.     No friction rub. No gallop.   Pulmonary:      Effort: Pulmonary effort is normal. No respiratory distress.      Breath sounds: No stridor. Rhonchi present. No wheezing or rales.   Chest:      Chest wall: No tenderness.   Abdominal:      General: Abdomen is flat. There is no distension.      Palpations: Abdomen is soft. There is no mass.      Tenderness: There is  no abdominal tenderness.   Musculoskeletal:         General: Normal range of motion.      Cervical back: Normal range of motion.      Right lower leg: No edema.      Left lower leg: No edema.   Skin:     General: Skin is warm and dry.   Neurological:      Mental Status: She is alert and oriented to person, place, and time.   Psychiatric:         Mood and Affect: Mood normal.         Behavior: Behavior normal.

## 2025-02-10 NOTE — PATIENT INSTRUCTIONS
will check pulmonary function test and 6-minute walk and then decide if your COPD medication should be adjusted.  The test can be scheduled by calling

## 2025-02-21 ENCOUNTER — TELEPHONE (OUTPATIENT)
Dept: HEMATOLOGY/ONCOLOGY | Facility: HOSPITAL | Age: 75
End: 2025-02-21
Payer: COMMERCIAL

## 2025-02-21 LAB
25(OH)D3+25(OH)D2 SERPL-MCNC: 70 NG/ML (ref 30–100)
ALBUMIN SERPL-MCNC: 4.6 G/DL (ref 3.6–5.1)
ALBUMIN/GLOB SERPL: 1.6 (CALC) (ref 1–2.5)
ALP SERPL-CCNC: 72 U/L (ref 37–153)
ALT SERPL-CCNC: 26 U/L (ref 6–29)
AST SERPL-CCNC: 36 U/L (ref 10–35)
BILIRUB DIRECT SERPL-MCNC: 0.2 MG/DL
BILIRUB INDIRECT SERPL-MCNC: 0.5 MG/DL (CALC) (ref 0.2–1.2)
BILIRUB SERPL-MCNC: 0.7 MG/DL (ref 0.2–1.2)
CHOLEST SERPL-MCNC: 213 MG/DL
CHOLEST/HDLC SERPL: 2.6 (CALC)
GLOBULIN SER CALC-MCNC: 2.8 G/DL (CALC) (ref 1.9–3.7)
HDLC SERPL-MCNC: 82 MG/DL
LDLC SERPL CALC-MCNC: 113 MG/DL (CALC)
NONHDLC SERPL-MCNC: 131 MG/DL (CALC)
PROT SERPL-MCNC: 7.4 G/DL (ref 6.1–8.1)
T4 FREE SERPL-MCNC: 0.8 NG/DL (ref 0.8–1.8)
TRIGL SERPL-MCNC: 82 MG/DL
TSH SERPL-ACNC: 82.33 MIU/L (ref 0.4–4.5)
URATE SERPL-MCNC: 4.9 MG/DL (ref 2.5–7)
VIT B12 SERPL-MCNC: 712 PG/ML (ref 200–1100)

## 2025-02-21 NOTE — TELEPHONE ENCOUNTER
Patient called to confirm if labs needed prior to 3/5 apt with Dr Restrepo. Aware of creatinine lab order in from Dr Iglesias for prior to 2/28 CT. Patient will get labs drawn in the next couple of days at  [facility] in [place].

## 2025-02-24 DIAGNOSIS — I25.10 CORONARY ARTERIOSCLEROSIS: ICD-10-CM

## 2025-02-24 DIAGNOSIS — Z00.00 ENCOUNTER FOR PREVENTATIVE ADULT HEALTH CARE EXAMINATION: ICD-10-CM

## 2025-02-24 DIAGNOSIS — E03.9 HYPOTHYROIDISM, UNSPECIFIED TYPE: Primary | ICD-10-CM

## 2025-02-24 RX ORDER — LEVOTHYROXINE SODIUM 150 UG/1
150 TABLET ORAL DAILY
Qty: 30 TABLET | Refills: 1 | Status: SHIPPED | OUTPATIENT
Start: 2025-02-24 | End: 2026-02-24

## 2025-02-24 RX ORDER — EZETIMIBE 10 MG/1
10 TABLET ORAL DAILY
Qty: 90 TABLET | Refills: 3 | Status: SHIPPED | OUTPATIENT
Start: 2025-02-24 | End: 2026-02-19

## 2025-02-25 ENCOUNTER — LAB (OUTPATIENT)
Dept: LAB | Facility: CLINIC | Age: 75
End: 2025-02-25
Payer: COMMERCIAL

## 2025-02-25 DIAGNOSIS — C34.31 PRIMARY CANCER OF RIGHT LOWER LOBE OF LUNG (MULTI): ICD-10-CM

## 2025-02-25 LAB
CREAT SERPL-MCNC: 1 MG/DL (ref 0.6–1.3)
GFR SERPL CREATININE-BSD FRML MDRD: 59 ML/MIN/1.73M*2

## 2025-02-25 PROCEDURE — 36415 COLL VENOUS BLD VENIPUNCTURE: CPT

## 2025-02-25 PROCEDURE — 82565 ASSAY OF CREATININE: CPT

## 2025-02-28 ENCOUNTER — HOSPITAL ENCOUNTER (OUTPATIENT)
Dept: RADIOLOGY | Facility: CLINIC | Age: 75
Discharge: HOME | End: 2025-02-28
Payer: COMMERCIAL

## 2025-02-28 DIAGNOSIS — C34.31 PRIMARY CANCER OF RIGHT LOWER LOBE OF LUNG (MULTI): ICD-10-CM

## 2025-02-28 PROCEDURE — 71260 CT THORAX DX C+: CPT

## 2025-02-28 PROCEDURE — 2550000001 HC RX 255 CONTRASTS

## 2025-02-28 RX ADMIN — IOHEXOL 68 ML: 300 INJECTION, SOLUTION INTRAVENOUS at 09:12

## 2025-03-03 NOTE — PROGRESS NOTES
Patient ID: Sylwia Greenberg is a 74 y.o. female    Primary Care Provider: Gloria Mitchell DO    DIAGNOSIS AND STAGING  Stage IIIA ([id number]) NSCLC (squamous cell carcinoma) of the RLL -   S/p RLL lobectomy on 07/16/20 - intraoperatively the tumor was found to be abutting the right atrium (final pathology showed microscopic focus of invasion of pulmonary vein)      SITES OF DISEASE  RLL     MOLECULAR GENOMICS  MICROSATELLITE STATUS: Microsatellite Stable (JESSICA)     DISEASE ASSOCIATED GENOMIC FINDINGS:  TP53 p.E204* ([id number]: c.610G>T)  TP53 p.G587Kjd*51 ([id number]: c.[id number])     DISEASE RELEVANT ALTERATIONS NOT DETECTED:  Negative for ALK fusion.  Negative for BRAF V600E.  Negative for EGFR (sensitizing) mutation.  Negative for NTRK fusion.  Negative for ROS1 fusion.  Negative for RET fusion.  Negative for MET exon 14 skipping mutation.       PRIOR THERAPIES  4 cycles of adjuvant cisplatin/docetaxel from 10/30/2020 through 12/30/2020.     CURRENT THERAPY  Surveillance    CURRENT ONCOLOGICAL PROBLEMS  None     HISTORY OF PRESENT ILLNESS  smoker, who was found to have on a low dose screening CT a RLL mass. She was completely asymptomatic. Denies weight loss or fatigue  or respiratory sx.       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 Intra-operatively the tumor was found to be abutting the right  atrium -   pT4N0-   Tumor is TPS 35%      Meets with  medical oncology on 08/12/20:   After her surgery she developed PNS that led to a readmission less than 24 hours after discharge - Her last Levaquin dose was 2 days ago   She feels now very weak   Appetite is recovering -   She is using O2 (93-96%)  Endorses insomnia - using melatonin 5 mg to no avail     08/14/20: MRI brain shows no intracranial metastasis      08/16/20: admitted to [facility] with atrial tachycardia vs atypical flutter secondary to post-operative pericarditis. On amiodarone and colchicine (for 3 months) - following with cardiology.      10/30/20: C1D1 cisplatin/docetaxel   11/18/20: C2D1 cisplatin/docetaxel   12/09/20: C3D1 cisplatin/docetaxel   12/30/20: C4D1 cisplatin/docetaxel      PAST MEDICAL HISTORY  COPD  Gout  Osteopenia  Chemotherapy-induced peripheral neuropathy/chest wall pain related to prior to VATS procedure  Hypothyroidism  Insomnia  Hyperlipidemia  Hiatal hernia   GERD     SOCIAL HISTORY  She used to work as a  ([employer])-   She is  and lives alone  Has 3 children, 40, 39, 35. No grandkids  There is no occupational history     FAMILY HISTORY  Noncontributory    CURRENT MEDS REVIEWED       ALLERGIES  NKDA     SUBJECTIVE:  Patient doing well. She has a cough and infectious stigmata. She is otherwise well - has 1 child undergoing IVF and is hoping for a grandchild. She is frustrated at being ill after being healthy prior to her initial diagnosis.    A 13 point review of systems was performed, with significant findings documented above in subjective history.    OBJECTIVE:  Vitals:    03/05/25 0850   BP: 124/82   Pulse: 71   Resp: 18   Temp: 36.8 °C (98.2 °F)   SpO2: 93%        Body surface area is 1.73 meters squared.     Gen: A&O, NAD  Head: Normocephalic, atraumatic  Eyes: no scleral icterus  ENT: mucous membranes moist, no oropharyngeal lesions  Resp: Lungs CTAB  Cardiac: Normal rate, regular rhythm, no murmurs appreciated  Abdomen: Soft, nondistended,  nontender, +BS  Neuro: CNII-XII grossly intact  Psych: appropriate mood & affect  Skin: warm, dry, no apparent rashes    Diagnostic Results   Results:  Labs:  Lab Results   Component Value Date    WBC 11.0 01/30/2025    HGB 14.7 01/30/2025    HCT 45.8 01/30/2025    MCV 92 01/30/2025     01/30/2025      Lab Results   Component Value Date    NEUTROABS 10.15 (H) 01/30/2025      Lab Results   Component Value Date    GLUCOSE 140 (H) 01/30/2025    CALCIUM 10.2 01/30/2025     01/30/2025    K 3.6 01/30/2025    CO2 32 01/30/2025    CL 95 (L) 01/30/2025    BUN 14 01/30/2025    CREATININE 0.82 01/30/2025    MG 1.79 01/30/2025     Lab Results   Component Value Date    ALT 26 02/20/2025    AST 36 (H) 02/20/2025    ALKPHOS 72 02/20/2025    BILITOT 0.7 02/20/2025    BILIDIR 0.2 02/20/2025      Lab Results   Component Value Date    TSH 82.33 (H) 02/20/2025    FREET4 0.8 02/20/2025     CT Chest 1/30/25  IMPRESSION:  1. No evidence of acute pulmonary embolism.  2. Abnormal esophagus which appears to be distended and filled with  fluid and air-fluid level. An esophageal pathology and  gastroesophageal reflux should be considered. Recommend nonemergent  esophagram and or EGD to further evaluate.  3. Hyperinflation with findings of likely smaller right disease as  described and similar to the prior exam.        Assessment/Plan   Stage IIIA non-small cell lung cancer (squamous cell carcinoma) of the right lower lobe, status post resection followed by adjuvant systemic chemotherapy as above.  Has been on surveillance ever since.  Most recent scans on 01/30/25 with no concerns for disease recurrence or new primary  Repeat scans in 6 months from now, September 2025, with a follow-up then at which point will transition to yearly.    COPD  Follows with Dr. Field  Requiring oxygen nocturnally  Has had frequent episodes of PNA likely related to aspiration (history of hiatal hernia). Does not appear to be actively infected from  clinical standpoint  Patient to reach out to pulm with any changes in her respiratory symptoms    #Esophageal distension in setting of known esophageal dysmotility  -Following with Dr. Mora for this and she is under consideration for esophagectomy and gastric pull up

## 2025-03-05 ENCOUNTER — OFFICE VISIT (OUTPATIENT)
Dept: HEMATOLOGY/ONCOLOGY | Facility: CLINIC | Age: 75
End: 2025-03-05
Payer: COMMERCIAL

## 2025-03-05 VITALS
DIASTOLIC BLOOD PRESSURE: 82 MMHG | OXYGEN SATURATION: 93 % | HEART RATE: 71 BPM | SYSTOLIC BLOOD PRESSURE: 124 MMHG | RESPIRATION RATE: 18 BRPM | TEMPERATURE: 98.2 F | WEIGHT: 156 LBS | BODY MASS INDEX: 30.47 KG/M2

## 2025-03-05 DIAGNOSIS — C34.31 PRIMARY CANCER OF RIGHT LOWER LOBE OF LUNG (MULTI): ICD-10-CM

## 2025-03-05 PROCEDURE — 1159F MED LIST DOCD IN RCRD: CPT | Performed by: STUDENT IN AN ORGANIZED HEALTH CARE EDUCATION/TRAINING PROGRAM

## 2025-03-05 PROCEDURE — 99214 OFFICE O/P EST MOD 30 MIN: CPT | Performed by: STUDENT IN AN ORGANIZED HEALTH CARE EDUCATION/TRAINING PROGRAM

## 2025-03-05 PROCEDURE — 1036F TOBACCO NON-USER: CPT | Performed by: STUDENT IN AN ORGANIZED HEALTH CARE EDUCATION/TRAINING PROGRAM

## 2025-03-05 PROCEDURE — 1157F ADVNC CARE PLAN IN RCRD: CPT | Performed by: STUDENT IN AN ORGANIZED HEALTH CARE EDUCATION/TRAINING PROGRAM

## 2025-03-05 PROCEDURE — 1126F AMNT PAIN NOTED NONE PRSNT: CPT | Performed by: STUDENT IN AN ORGANIZED HEALTH CARE EDUCATION/TRAINING PROGRAM

## 2025-03-05 RX ORDER — FLUTICASONE FUROATE, UMECLIDINIUM BROMIDE AND VILANTEROL TRIFENATATE 100; 62.5; 25 UG/1; UG/1; UG/1
1 POWDER RESPIRATORY (INHALATION)
COMMUNITY
Start: 2024-11-22

## 2025-03-05 ASSESSMENT — PAIN SCALES - GENERAL: PAINLEVEL_OUTOF10: 0-NO PAIN

## 2025-03-05 NOTE — PROGRESS NOTES
Patient here today for follow up. She's been sick with a stomach bug since Saturday. She has been in bed since then and has been eating very little. She has history of aspiration pneumonia and known history of esophageal issues along with a hiatal hernia.     Fatigue: low energy level   PO intake: a little less intake over the last few days, but previously no issues as long as she eats slowly and doesn't eat too much  Weight: stable      Medications and pharmacy preference reviewed with patient.

## 2025-03-07 ENCOUNTER — HOSPITAL ENCOUNTER (OUTPATIENT)
Dept: RESPIRATORY THERAPY | Facility: HOSPITAL | Age: 75
Discharge: HOME | End: 2025-03-07
Payer: COMMERCIAL

## 2025-03-07 DIAGNOSIS — J44.9 CHRONIC OBSTRUCTIVE PULMONARY DISEASE, UNSPECIFIED COPD TYPE (MULTI): ICD-10-CM

## 2025-03-07 PROCEDURE — 94726 PLETHYSMOGRAPHY LUNG VOLUMES: CPT | Performed by: INTERNAL MEDICINE

## 2025-03-07 PROCEDURE — 94618 PULMONARY STRESS TESTING: CPT | Performed by: INTERNAL MEDICINE

## 2025-03-07 PROCEDURE — 94729 DIFFUSING CAPACITY: CPT | Performed by: INTERNAL MEDICINE

## 2025-03-07 PROCEDURE — 94060 EVALUATION OF WHEEZING: CPT | Performed by: INTERNAL MEDICINE

## 2025-03-07 PROCEDURE — 94726 PLETHYSMOGRAPHY LUNG VOLUMES: CPT

## 2025-03-12 ENCOUNTER — DOCUMENTATION (OUTPATIENT)
Dept: PULMONOLOGY | Facility: HOSPITAL | Age: 75
End: 2025-03-12
Payer: COMMERCIAL

## 2025-03-12 DIAGNOSIS — J44.9 CHRONIC OBSTRUCTIVE PULMONARY DISEASE, UNSPECIFIED COPD TYPE (MULTI): Primary | ICD-10-CM

## 2025-03-12 RX ORDER — BUDESONIDE 0.5 MG/2ML
0.5 INHALANT ORAL 2 TIMES DAILY
Qty: 120 ML | Refills: 11 | Status: SHIPPED | OUTPATIENT
Start: 2025-03-12 | End: 2026-03-12

## 2025-03-12 NOTE — PROGRESS NOTES
PFTs outlined an FVC of 1.57 L 67% digit with an FEV1 of 0.6 cm 37% predicted and an FEV1 percent of 43%.  After bronchodilators the FEV1 improved 26%.  0.85 L or 47%.  2.  The TLC was 102% but the the DLCO was 8.02 mL/min/mmHg.  Her airflow obstruction is clearly worse her DLCO is about the same.  She has more air trapping.  I am going to add budesonide nebulization 0.5 mg per 2 mL to her regimen twice daily.  She is also on Trelegy daily.  She will continue with albuterol as needed via HFA or nebulizer.  She will get back to me next month.

## 2025-03-19 ENCOUNTER — TELEPHONE (OUTPATIENT)
Dept: PULMONOLOGY | Facility: CLINIC | Age: 75
End: 2025-03-19
Payer: COMMERCIAL

## 2025-03-19 NOTE — TELEPHONE ENCOUNTER
Patient sts she is switching oxygen companies and need a new script for her oxygen sent to [facility] along with qualifying test and notes. Thanks

## 2025-03-20 ENCOUNTER — TELEPHONE (OUTPATIENT)
Dept: PULMONOLOGY | Facility: CLINIC | Age: 75
End: 2025-03-20
Payer: COMMERCIAL

## 2025-03-20 NOTE — TELEPHONE ENCOUNTER
Patient sts she's been having problems with her breathing. She sts she took her pulse ox after walking 5-6 steps and her O2 dropped below 88.    We sent over the script for her night time O2 to [facility] but she did not qualify for daytime O2.     Please call the patient to advise

## 2025-03-25 ENCOUNTER — TELEPHONE (OUTPATIENT)
Dept: CARDIOLOGY | Facility: HOSPITAL | Age: 75
End: 2025-03-25
Payer: COMMERCIAL

## 2025-03-25 DIAGNOSIS — Q24.8 LEFT VENTRICULAR OUTFLOW TRACT OBSTRUCTION (HHS-HCC): ICD-10-CM

## 2025-03-25 DIAGNOSIS — R00.2 PALPITATION: ICD-10-CM

## 2025-03-25 DIAGNOSIS — E78.00 HYPERCHOLESTEROLEMIA: ICD-10-CM

## 2025-03-25 DIAGNOSIS — I25.10 CORONARY ARTERIOSCLEROSIS: ICD-10-CM

## 2025-03-25 DIAGNOSIS — R32 URINARY INCONTINENCE, UNSPECIFIED TYPE: ICD-10-CM

## 2025-03-25 RX ORDER — ROSUVASTATIN CALCIUM 20 MG/1
20 TABLET, COATED ORAL DAILY
Qty: 90 TABLET | Refills: 3 | Status: SHIPPED | OUTPATIENT
Start: 2025-03-25

## 2025-03-25 RX ORDER — OXYBUTYNIN CHLORIDE 5 MG/1
5 TABLET ORAL DAILY
Qty: 90 TABLET | Refills: 3 | Status: SHIPPED | OUTPATIENT
Start: 2025-03-25

## 2025-03-25 RX ORDER — METOPROLOL SUCCINATE 100 MG/1
100 TABLET, EXTENDED RELEASE ORAL DAILY
Qty: 90 TABLET | Refills: 3 | Status: SHIPPED | OUTPATIENT
Start: 2025-03-25

## 2025-04-07 DIAGNOSIS — E03.9 HYPOTHYROIDISM, UNSPECIFIED TYPE: ICD-10-CM

## 2025-04-17 ENCOUNTER — DOCUMENTATION (OUTPATIENT)
Dept: PULMONOLOGY | Facility: HOSPITAL | Age: 75
End: 2025-04-17

## 2025-04-17 ENCOUNTER — OFFICE VISIT (OUTPATIENT)
Dept: CARDIOLOGY | Facility: CLINIC | Age: 75
End: 2025-04-17
Payer: MEDICARE

## 2025-04-17 VITALS
WEIGHT: 157 LBS | SYSTOLIC BLOOD PRESSURE: 136 MMHG | HEART RATE: 64 BPM | DIASTOLIC BLOOD PRESSURE: 82 MMHG | HEIGHT: 60 IN | OXYGEN SATURATION: 93 % | BODY MASS INDEX: 30.82 KG/M2

## 2025-04-17 DIAGNOSIS — R06.09 DYSPNEA ON EXERTION: ICD-10-CM

## 2025-04-17 DIAGNOSIS — Q24.8 LEFT VENTRICULAR OUTFLOW TRACT OBSTRUCTION (HHS-HCC): ICD-10-CM

## 2025-04-17 DIAGNOSIS — J44.9 CHRONIC OBSTRUCTIVE PULMONARY DISEASE, UNSPECIFIED COPD TYPE (MULTI): Primary | ICD-10-CM

## 2025-04-17 DIAGNOSIS — E78.00 HYPERCHOLESTEROLEMIA: ICD-10-CM

## 2025-04-17 DIAGNOSIS — I25.10 CORONARY ARTERIOSCLEROSIS: Primary | ICD-10-CM

## 2025-04-17 PROCEDURE — 1160F RVW MEDS BY RX/DR IN RCRD: CPT | Performed by: INTERNAL MEDICINE

## 2025-04-17 PROCEDURE — 1036F TOBACCO NON-USER: CPT | Performed by: INTERNAL MEDICINE

## 2025-04-17 PROCEDURE — 3008F BODY MASS INDEX DOCD: CPT | Performed by: INTERNAL MEDICINE

## 2025-04-17 PROCEDURE — 99214 OFFICE O/P EST MOD 30 MIN: CPT | Performed by: INTERNAL MEDICINE

## 2025-04-17 PROCEDURE — 1157F ADVNC CARE PLAN IN RCRD: CPT | Performed by: INTERNAL MEDICINE

## 2025-04-17 PROCEDURE — G2211 COMPLEX E/M VISIT ADD ON: HCPCS | Performed by: INTERNAL MEDICINE

## 2025-04-17 PROCEDURE — 1159F MED LIST DOCD IN RCRD: CPT | Performed by: INTERNAL MEDICINE

## 2025-04-17 PROCEDURE — 1126F AMNT PAIN NOTED NONE PRSNT: CPT | Performed by: INTERNAL MEDICINE

## 2025-04-17 RX ORDER — PREDNISONE 10 MG/1
TABLET ORAL
Qty: 30 TABLET | Refills: 0 | Status: SHIPPED | OUTPATIENT
Start: 2025-04-17 | End: 2025-05-17

## 2025-04-17 RX ORDER — CEFUROXIME AXETIL 500 MG/1
500 TABLET ORAL 2 TIMES DAILY
Qty: 20 TABLET | Refills: 0 | Status: SHIPPED | OUTPATIENT
Start: 2025-04-17 | End: 2025-04-27

## 2025-04-17 ASSESSMENT — ENCOUNTER SYMPTOMS
OCCASIONAL FEELINGS OF UNSTEADINESS: 0
LOSS OF SENSATION IN FEET: 1
DEPRESSION: 0

## 2025-04-17 ASSESSMENT — PATIENT HEALTH QUESTIONNAIRE - PHQ9
SUM OF ALL RESPONSES TO PHQ9 QUESTIONS 1 AND 2: 0
1. LITTLE INTEREST OR PLEASURE IN DOING THINGS: NOT AT ALL
2. FEELING DOWN, DEPRESSED OR HOPELESS: NOT AT ALL

## 2025-04-17 ASSESSMENT — PAIN SCALES - GENERAL: PAINLEVEL_OUTOF10: 0-NO PAIN

## 2025-04-17 NOTE — PROGRESS NOTES
Patient was in the hospital with breathing problems and had low oxygen levels and some chest pain.  She was in [facility] and no specific abnormality was found except she had an abnormal mammogram and a biopsy was performed.  It sounds like she is having a flare of her COPD I will give her some prednisone and antibiotics.  She will make an appointment to see me

## 2025-04-17 NOTE — PROGRESS NOTES
"Bridget Greenberg \"Sylwia\" is a 74 y.o. female who presents to the [facility]  for follow up of exertional dyspnea. Last seen in November 2024    Since our last visit, Ms. Greenberg has more exertional dyspnea and lightheadedness. Note elevated heart rate with activity. Has increased oral fluid intake. Notes orthostasis with standing, worse in the morning but less than at last visit. Has more wheezing in the last 2 months. Dr. Field has adjusted her inhalers last month.    Repeat December 2023 echocardiogram showed LVOT gradient was not present at HR target < 70 bpm.     Activity limited by knee pain with walking 1 block or 1 flight of stairs.     No active cardiac symptoms of chest pain, PND, orthopnea, ZOEY, syncope, or claudication.      Past Medical History:  1. Asthma  2. GERD  3. Hypothyroidism  4. Coronary arteriosclerosis'  5. Dyslipidemia  6. lung cancer s/p right lower lobectomy 2020 and chemotherapy  7. COPD  8. 8/16/2020 atypical atrial flutter episode in setting of pericarditis s/p July 2020 VATS surgery. No recurrence. Took 1 month of postoperative amiodarone and stopped after 8/28/2024 evaluation with Dr. Ramos.    Social History:  former heavy smoker, quit 2020 prior to lung cancer surgery     Family History:  No premature CAD in 1st degree relatives ( 55 years of age for male relatives, 65 years of age for female relatives)     Review of Systems    A 14 point review of systems was asked. All questions were negative except for pertinent positives listed in the HPI.      Objective   Physical Exam  BP Readings from Last 3 Encounters:   04/17/25 136/82   03/05/25 124/82   02/10/25 118/75      Wt Readings from Last 3 Encounters:   04/17/25 71.2 kg (157 lb)   03/05/25 70.8 kg (156 lb)   02/10/25 71.7 kg (158 lb)      BMI: Estimated body mass index is 30.66 kg/m² as calculated from the following:    Height as of this encounter: 1.524 m (5').    Weight as of this " encounter: 71.2 kg (157 lb).  BSA: Estimated body surface area is 1.74 meters squared as calculated from the following:    Height as of this encounter: 1.524 m (5').    Weight as of this encounter: 71.2 kg (157 lb).    General: no acute distress  HEENT: EOMI, no scleral icterus.  Lungs: Clear to auscultation bilaterally without wheezing, rales, or rhonchi.  Cardiovascular: Regular rhythm and rate. Normal S1 and S2. No murmurs, rubs, or gallops are appreciated. JVP normal.  Abdomen: Soft, nontender, nondistended. Bowel sounds present.  Extremities: Warm and well perfused with equal 2+ pulses bilaterally.  No edema present.  Neurologic: Alert and oriented x3.    I have personally reviewed the following images and laboratory findings:  Last echocardiogram:  Most recent echo, 2023: LV EF 70%, no LVH (LVMI 50 gm/m2), intracavitary gradient not reported, normal diastology (E/e' 9), normal LA size (CAMRYN 22 ml/m2), normal RV/RA, trace MR, trace TR, RVSP 35 mm Hg (RAP 3 mm Hg).    Prior echo, 3/14/2022: LV EF 70-75%, hyperdyanmic LV function with dynamic intracavitary gradient 25 mm Hg w/ Valsalva, impaired relaxation LV diastology (E/e' 7), normal LA size (CAMRYN 21 ml/m2), normal RV/RA, no AI, trace MR, trace-mild TR, RVSP 39 mm Hg,    Last cath / stress test:  2020 SPECT stress: No ischemia or scar. LV EF > 65%    2022 CCTA: Nonobstructive coronary arteriosclerosis plaque (LAD prox/mid 25-50%, LCx: prox 25-50%, RCA: 25% prox).     2025 CT chest: Normal aorta course and caliber. Diffuse atherosclerosis in aorta and coronary arteries. Similar to prior 2024 CT chest scan.    Most recent EC2025 ECG: Sinus rhythm, 70 bpm, borderline LVH pattern. Personally reviewed in office.    2023 ECG: Sinus rhythm, 61 bpm, borderline LAE pattern. Personally reviewed in office.     2023 - 10/5/2023 Holter monitor: Average HR 70 bpm (43 - 144 bpm), PVC and PAC < 1%, no AFIb.    Lab Results   Component  "Value Date    CHOL 213 (H) 02/20/2025    CHOL 160 03/29/2024    CHOL 212 (H) 06/26/2023     Lab Results   Component Value Date    HDL 82 02/20/2025    HDL 49.4 03/29/2024    HDL 63.0 06/26/2023     Lab Results   Component Value Date    LDLCALC 113 (H) 02/20/2025    LDLCALC 92 03/29/2024     Lab Results   Component Value Date    TRIG 82 02/20/2025    TRIG 94 03/29/2024    TRIG 132 06/26/2023     No components found for: \"CHOLHDL\"      Assessment/Plan   1. Dyspnea on exertion:  Likely multifactorial - hyperdynamic LV function with dynamic mid-cavity gradient due to small LV cavity and hyperdynamic systolic function, potential pulmonary causes (COPD, low DLCO after smoking and lung resection, etc.).     December 2023 echocardiogram showed no further LVOT gradient on metoprolol ER 50 mg a day with HR 60-70 bpm.     Heart rate today is 64 bpm. We increased metoprolol ER to 100 mg a day at prior November 2024 visit.    Instructed patient to keep fluid intake above 2 liters/day to help expand LV end diastolic volume as well. Lightheadedness due to orthostatic reaction to position change.     Check repeat echocardiogram now to assess for new cardiac signals of dyspnea on exertion and to evaluate for LVOT gradient.     2.Coronary arteriosclerosis:  Goal LDL < 70 - not at goal on 2023 lab work. Switched from atorvastatin 40 to rosuvastatin 20 mg at 9/2023 office visit.      Continue heart healthy diet. Continue aerobic exercise program.    3. Palpitations  Well controlled heart rate on 9/2023 14 day HR monitor. No AFib seen. Rare PAC/PVCs.     August 2020 atypical atrial flutter episode (< 1 hour) was due to post-operative status from VATS and resolved with 1 month of amiodarone and resolution of post-op pericarditis. Will observe.     Follow up with Dr. Carroll in 6 months.     Time = 30 minutes on direct patient care reviewing current symptoms and discussing treatment options, reviewing prior cardiac care, and coordinating " follow up testing and medication renewal.        SIGNATURE: Sander Carroll MD PATIENT NAME: Breonna Greenberg   DATE/TIME: April 17, 2025 3:26 PM MRN: [id number]

## 2025-04-17 NOTE — PATIENT INSTRUCTIONS
You were seen in the [facility] for your chronic shortness of breath (the medical term is called dyspnea).     I reviewed your 2022 heart and lung test results. Your coronary arteries do not have major blockages on CT chest angiogram.      For palpitations, I had you wear a 14 day event monitor after our last visit from 9/21 - 10/5/2023. Your heart rate monitor result was normal with an average heart rate of 70 beats a minute, rate extra heart beats (PACs, PVCs), and no atrial fibrillation.       On reviewing your March 2022 echocardiogram, there was a finding of hyperdynamic heart squeezing function with ejection fraction 70-75%. This caused a dynamic obstruction within the heart's pumping chamber that was causing your shortness of breath call LV outflow tract obstruction. Repeat December 2023 echocardiogram images no longer show a pressure build up inside your heart with a slower heart rate on metoprolol ER 50 mg a day. We increased your metoprolol to  mg a day at last visit in November 2024 and your resting heart rate today is good at 64 beats per minute.     I am ordering a repeat Echocardiogram (ultrasound of the heart) for your ongoing shortness of breath. You have a lot of wheezing on exam today. Continue to use your inhalers to help your shortness of breath.     Drink more fluids through out the day to help with your lightheadedness. Drink 2 liters (8 8 ounce cups) daily with 16 ounces of water first in the morning.     We changed your cholesterol medication by stopping atorvastatin 40mg daily and starting rosuvastatin 20mg daily in September 2023.      Follow up with Dr. Carroll in 6 months.

## 2025-04-25 ENCOUNTER — APPOINTMENT (OUTPATIENT)
Dept: CARDIOLOGY | Facility: CLINIC | Age: 75
End: 2025-04-25
Payer: MEDICARE

## 2025-04-28 ENCOUNTER — TELEPHONE (OUTPATIENT)
Dept: PULMONOLOGY | Facility: CLINIC | Age: 75
End: 2025-04-28

## 2025-04-28 ENCOUNTER — HOSPITAL ENCOUNTER (OUTPATIENT)
Dept: RADIOLOGY | Facility: HOSPITAL | Age: 75
Discharge: HOME | End: 2025-04-28
Payer: MEDICARE

## 2025-04-28 ENCOUNTER — HOSPITAL ENCOUNTER (OUTPATIENT)
Dept: RADIOLOGY | Facility: CLINIC | Age: 75
Discharge: HOME | End: 2025-04-28
Payer: MEDICARE

## 2025-04-28 ENCOUNTER — OFFICE VISIT (OUTPATIENT)
Dept: PULMONOLOGY | Facility: CLINIC | Age: 75
End: 2025-04-28
Payer: MEDICARE

## 2025-04-28 ENCOUNTER — LAB REQUISITION (OUTPATIENT)
Dept: LAB | Facility: HOSPITAL | Age: 75
End: 2025-04-28
Payer: MEDICARE

## 2025-04-28 VITALS
BODY MASS INDEX: 30.58 KG/M2 | DIASTOLIC BLOOD PRESSURE: 73 MMHG | HEART RATE: 64 BPM | OXYGEN SATURATION: 93 % | RESPIRATION RATE: 18 BRPM | WEIGHT: 156.6 LBS | SYSTOLIC BLOOD PRESSURE: 126 MMHG | TEMPERATURE: 97 F

## 2025-04-28 DIAGNOSIS — R06.02 SOB (SHORTNESS OF BREATH): ICD-10-CM

## 2025-04-28 DIAGNOSIS — R06.02 SHORTNESS OF BREATH: ICD-10-CM

## 2025-04-28 DIAGNOSIS — R06.02 SOB (SHORTNESS OF BREATH): Primary | ICD-10-CM

## 2025-04-28 DIAGNOSIS — J44.1 COPD EXACERBATION (MULTI): ICD-10-CM

## 2025-04-28 LAB
ANION GAP SERPL CALC-SCNC: 11 MMOL/L (ref 10–20)
BUN SERPL-MCNC: 16 MG/DL (ref 6–23)
CALCIUM SERPL-MCNC: 9.7 MG/DL (ref 8.6–10.3)
CHLORIDE SERPL-SCNC: 101 MMOL/L (ref 98–107)
CO2 SERPL-SCNC: 37 MMOL/L (ref 21–32)
CREAT SERPL-MCNC: 0.51 MG/DL (ref 0.6–1.3)
CREAT SERPL-MCNC: 0.85 MG/DL (ref 0.5–1.05)
EGFRCR SERPLBLD CKD-EPI 2021: 72 ML/MIN/1.73M*2
GFR SERPL CREATININE-BSD FRML MDRD: >90 ML/MIN/1.73M*2
GLUCOSE SERPL-MCNC: 106 MG/DL (ref 74–99)
POTASSIUM SERPL-SCNC: 3.2 MMOL/L (ref 3.5–5.3)
SODIUM SERPL-SCNC: 146 MMOL/L (ref 136–145)

## 2025-04-28 PROCEDURE — 71275 CT ANGIOGRAPHY CHEST: CPT

## 2025-04-28 PROCEDURE — 1157F ADVNC CARE PLAN IN RCRD: CPT | Performed by: INTERNAL MEDICINE

## 2025-04-28 PROCEDURE — 1036F TOBACCO NON-USER: CPT | Performed by: INTERNAL MEDICINE

## 2025-04-28 PROCEDURE — 71275 CT ANGIOGRAPHY CHEST: CPT | Performed by: STUDENT IN AN ORGANIZED HEALTH CARE EDUCATION/TRAINING PROGRAM

## 2025-04-28 PROCEDURE — 80048 BASIC METABOLIC PNL TOTAL CA: CPT

## 2025-04-28 PROCEDURE — 2550000001 HC RX 255 CONTRASTS: Performed by: INTERNAL MEDICINE

## 2025-04-28 PROCEDURE — 1159F MED LIST DOCD IN RCRD: CPT | Performed by: INTERNAL MEDICINE

## 2025-04-28 PROCEDURE — 99214 OFFICE O/P EST MOD 30 MIN: CPT | Mod: 25 | Performed by: INTERNAL MEDICINE

## 2025-04-28 PROCEDURE — 71046 X-RAY EXAM CHEST 2 VIEWS: CPT

## 2025-04-28 PROCEDURE — 82565 ASSAY OF CREATININE: CPT

## 2025-04-28 PROCEDURE — 99214 OFFICE O/P EST MOD 30 MIN: CPT | Performed by: INTERNAL MEDICINE

## 2025-04-28 PROCEDURE — 1160F RVW MEDS BY RX/DR IN RCRD: CPT | Performed by: INTERNAL MEDICINE

## 2025-04-28 PROCEDURE — 71046 X-RAY EXAM CHEST 2 VIEWS: CPT | Performed by: RADIOLOGY

## 2025-04-28 RX ORDER — AZITHROMYCIN 250 MG/1
TABLET, FILM COATED ORAL
Qty: 6 TABLET | Refills: 0 | Status: SHIPPED | OUTPATIENT
Start: 2025-04-28 | End: 2025-05-03

## 2025-04-28 RX ORDER — PREDNISONE 10 MG/1
TABLET ORAL
Qty: 30 TABLET | Refills: 0 | Status: SHIPPED | OUTPATIENT
Start: 2025-04-28 | End: 2025-04-29

## 2025-04-28 RX ORDER — AMOXICILLIN AND CLAVULANATE POTASSIUM 500; 125 MG/1; MG/1
1 TABLET, FILM COATED ORAL 2 TIMES DAILY
Qty: 20 TABLET | Refills: 0 | Status: SHIPPED | OUTPATIENT
Start: 2025-04-28 | End: 2025-05-08

## 2025-04-28 RX ADMIN — IOHEXOL 75 ML: 350 INJECTION, SOLUTION INTRAVENOUS at 12:43

## 2025-04-28 ASSESSMENT — ENCOUNTER SYMPTOMS
SINUS PAIN: 0
SHORTNESS OF BREATH: 1
NERVOUS/ANXIOUS: 0
AGITATION: 0
JOINT SWELLING: 0
WHEEZING: 0
APNEA: 0
HEMATURIA: 0
LIGHT-HEADEDNESS: 0
FATIGUE: 0
BRUISES/BLEEDS EASILY: 0
DIFFICULTY URINATING: 0
DIZZINESS: 0
ABDOMINAL DISTENTION: 0
CONSTIPATION: 0
PALPITATIONS: 0
EYE DISCHARGE: 0
STRIDOR: 0
ABDOMINAL PAIN: 0
UNEXPECTED WEIGHT CHANGE: 0
DYSURIA: 0
WEAKNESS: 0
FEVER: 0
HEADACHES: 0
SINUS PRESSURE: 0
RHINORRHEA: 0
SPEECH DIFFICULTY: 0
ARTHRALGIAS: 0
EYE REDNESS: 0
CHOKING: 0
ADENOPATHY: 0
NAUSEA: 0
TREMORS: 0
COUGH: 0
FREQUENCY: 0
NUMBNESS: 0
FACIAL SWELLING: 0
SLEEP DISTURBANCE: 0

## 2025-04-28 NOTE — TELEPHONE ENCOUNTER
Pt left a message with the answering service on Friday, 4/25, that she was short of breath. I spoke with the patient. She states she is not as bad now. She did not go to urgent care or the emergency room because she couldn't drive. Please call her back at [phone]

## 2025-04-28 NOTE — PROGRESS NOTES
@PULMONARY FOLLOW-UP@       PROBLEM: Shortness of breath    ASSESSMENT:  The patient is a 74-year with a history of COPD, hypothyroidism, lung cancer with a hyperdynamic left ventricle with outflow obstruction. 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  There has been no evidence of recurrence and her main issue of late has been aspiration probably from achalasia.  She has severe airflow obstruction which is worsening of late with a severely reduced DLCO.  She presents with increasing shortness of breath without a whole lot of cough and congestion.  Her CT angiogram reveals no PE but she has got some groundglass infiltrative changes which may be old in part but clearly she probably is having exacerbation of COPD accounting for her worsening manifestation.  She also will be treated for a component of pneumonia.      PLAN:  I am going to place her on a combination of Augmentin and azithromycin.  I am going to put her on a prednisone taper.  She will use her albuterol nebulization and continue with her other controller medications.  She will keep me posted how she is doing over the next several days.      HISTORY OF PRESENT ILLNESS:  This patient is 74-year-old with COPD and hypothyroidism with a history of lung cancer who was seen on March 30, 2022 elevated February of Complaining of shortness of breath over the last 4 months. After her right lower lobectomy she had atrial flutter and Pseudomonas pneumonia. She had the onset of shortness of breath over the previous 4 months without a definite exacerbation of COPD. Her PFTs performed on March 11, 2022 revealed a severely reduced DLCO but unchanged with a moderate degree of unchanged airflow obstruction. Her echocardiogram was unchanged with a mild elevation of her RVSP. With the relatively recent onset of shortness of breath I checked a D-dimer which was elevated. CFT angiogram revealed no PE on  March 11, 2022. There was evidence of the previous right lower lobectomy. She was going to see cardiology for completeness.      She ended up having a repeat CT angiogram in May 26, 2022 which revealed no PE. There was volume loss within the lingula which was new compared to the previous study. Also there was dilated esophagus with air-fluid levels and thickening distally associated with small hiatal hernia. Overall appearance similar to the recent prior. She did undergo an EGD on June 29, 2022. There is no evidence of any gastritis or H. pylori.     The CT scan from November 11, 2022 revealed the following: There has been interval clearing of previously seen sub-centimeter ground-glass centrilobular nodules in the mid aspect right upper lobe. However more inferiorly in the right upper lobe there has been interval development of peripheral predominant patchy ground-glass opacities and pleural based nodules (series 205 images [id number]). A few additional small peripheral ground-glass opacities in the right upper lobe (image [id number]), left upper lobe anterior segment (image 116/309), and left lower lobe along the major fissure (image [id number]/309) are new from prior. There has been an interval decrease in atelectasis in the lingula of the left upper lobe. There is mild-to-moderate apical predominant centrilobular emphysema.     As noted above her PFTs reveal moderate airflow obstruction with a significant bronchodilator response but because of her dyspnea she ended up seeing Dr. Carroll. It was felt that she had a hyperdynamic obstruction with a normal ejection fraction. Because of the outflow obstruction she was placed on metoprolol.      She was seen on November 21, 2022 and stated that about a month ago she developed increasing shortness of breath and congestion and was seen by a home care service and placed on prednisone and antibiotics. She got better initially but then had increasing shortness of breath with thick  phlegm production. She ended up having the CT scan as outlined above on November 11, 2022 revealing the groundglass changes. Her emergency room assessment for COVID was negative. She continues to have thick phlegm production and has tried Mucinex.  It was felt that the groundglass change could be related to a postviral illness.  Also potentially beta-blocker was potentiating her COPD.  I gave her a prednisone taper and albuterol nebulization.  A follow-up CT scan was to be obtained in 3 months..  She also had a COPD flare as was reported on December 20, 2022.     A follow-up CT scan from November 12, 2022 outlined the clearing of some parenchymal changes on the previous study but there were new opacities in other areas with the finding suggesting waxing waning of infectious or inflammatory process.  A follow-up study from 8/15/2023 outlined no suspicious nodularity.     She saw Dr. Carroll on September 21, 2023 and described increasing shortness of breath.  He had noted a hyperdynamic echocardiogram in 2022 and she was having some palpitation.  A repeat echocardiogram was to be obtained along with a monitor and she was continued on her beta-blocker.  Surrounding that visit she noted shortness of breath walking 20-30 steps.  She also had some nasal discharge.  Furthermore she saw GI on September 22, 2023 for esophageal reflux.     It was noted that she was admitted to the hospital October 4 through October 5, 2023 with increasing shortness of breath.  She was found to have a new 2.4 x 2.2 cm masslike opacity.  Her COPD was treated with corticosteroids and bronchodilators.  The echocardiogram from October 12, 2023 outlined hyperdynamic ventricle with ejection fraction of 77% with diastolic dysfunction.  The RVSP was normal.  The changes from the CT scan from October 4, 2023 during the hospitalization appeared postinflammatory in nature and she was placed on Augmentin as outlined by Dr. Iglesias.  There were also some  additional areas of nodularity which had increased inferiorly in the right lung and the left upper lobe     October 24, 2023 it was reported that the patient finished the antibiotic about a week ago and this began coughing again.  She is bringing up yellow phlegm.  She has a drip in her throat and congestion.  She has low-grade fevers.  No chest pains or pressures.  She has no chills.     The esophagram from November 15, 2023 revealed the following  1.  Diffuse dilatation of distal 2/3 of the esophagus below the level  of aortic arch with absence of peristalsis, which can be seen with  scleroderma in appropriate clinical setting. A degree of achalasia  can also be considered in the differential, however there is no  significant hold-up of contrast in the esophagus.  2. Linearly oriented fine mucosal folds in the dilated esophageal  segment could be related to scleroderma, however  esophagitis/Hawkins's esophagus secondary to gastroesophageal reflux  can also be considered in the differential. Recommend endoscopic and  histopathologic correlation.  3. Small hiatal hernia.     It was noted on February 19, 2024 that pulse oximetry as outlined saturations equal less than 88% for over 4 hours.  Nocturnal oxygen ministration was ordered.  PFTs from January 22, 2024 outlined an FVC of 1.96 L 83% predicted with an FEV1 of 0.90 L 49% predicted and FEV1 percent of  59%.  The FEV1 improved 7% after bronchodilators.  The TLC was 104% predicted the RV/TLC was 110% predicted and a DLCO was 48% predicted.  Overall the FEV1 was unchanged.  On the 6-minute walk she covered 256 m and desaturated from 93% down to 91%.  The walk distance was 63% predicted.     the CT scan of the chest from February 24, 2024 revealed the following  Improved patchiness in the right lower lobe.      There is consolidation and collapse in the left upper lobe. This may  represent scarring.      Stable nodules in the left lower lobe.      Coronary artery  disease.      Dilated fluid-filled esophagus.      Her achalasia has been managed by GI and other than small meals and referral to speech therapy there are no other options.     On May 22, 2024 it was reported that the patient intermittently has been having phlegm production and she was better when humidity was low.  She did note some shortness of breath walking into the exam room.  She had some increasing cough and congestion several weeks ago but it seems to resolve.  She is eating slower and having 4 meals per day.  She has not had any choking episodes.  She has not had any fevers or chills.  She notes that the albuterol does help when she uses a nebulizer.  She is on Anoro.     I summarized on May 22, 2024 how she had COPD, hypothyroidism, lung cancer with a hyperdynamic left ventricle with outflow obstruction.  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  There has been no evidence of recurrence and her main issue of late has been aspiration probably from achalasia.  For the latter there is no further medical treatment other than small meals speech therapy etc.  She has not been to the latter of late.  It should be appreciated that several weeks ago she had increasing cough and congestion and so a chest x-ray was obtained.  There is a faint infiltrate in the lingular on the lateral film.  For completeness we will put her on a course of Augmentin.  She does have an upcoming CT scan in several months.  She was given a course of Augmentin     The CT scan from August 26, 2024 revealed the following  1. Stable postoperative changes right lower lobectomy with stable  atelectasis.  2. Slight interval increase bronchial wall thickening and scattered  bilateral ground-glass opacities, likely secondary to atypical  pneumonia.  3. Interval decrease in the mixed patchy opacities in the lingula,  since prior.     She presented to the emergency room on  2025 with increasing shortness of breath with coughing and sputum production.  She was around various individuals who have been sick having been at a .  She had no evidence of acute infection or PE and was suspected to have a flare of COPD and was treated for such.  She was given azithromycin and prednisone     The CT angiogram from 2025 revealed the following  1. No evidence of acute pulmonary embolism.  2. Abnormal esophagus which appears to be distended and filled with  fluid and air-fluid level. An esophageal pathology and  gastroesophageal reflux should be considered. Recommend nonemergent  esophagram and or EGD to further evaluate.  3. Hyperinflation with findings of likely smaller right disease as  described and similar to the prior exam.     On February 10, 2025 it was noted, the patient has noted chronic shortness of breath with intermittent episodes of bronchitis and congestion.  She did go to the emergency room with increasing shortness of breath couple weeks ago and no PE was found.  There were no findings of pneumonia.  She does have a dilated esophagus  and she has an upcoming appointment with GI.  She did go for a second opinion at the [facility] regarding her COPD and really nothing was changed other than suggesting going on a Trelegy inhaler instead of Anoro.  She feels that over the last year she has continued shortness of breath and it may be somewhat worse.  She Short of Breath with Activities of Daily Living.    I summarized on February 10, 2025 that the patient  is a 74-year with a history of COPD, hypothyroidism, lung cancer with a hyperdynamic left ventricle with outflow obstruction. 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  There has been no evidence of recurrence and her main issue of late has been aspiration probably from achalasia.  Of late, she has not had any pneumonia but is  complaining of shortness of breath.  She has moderate to severe airflow obstruction with an FEV1 of around 49% predicted or 0.90 L and an FEV1/FVC ratio 46%.  Her DLCO was 48% predicted but she did not desaturate in the past with exercise.  Probably rechecking a pulm function tests are in order.  Currently she is on Anoro.  She has had no pulm embolization on her recent CT angiogram.  There is no evidence of aspiration but she has a dilated esophagus and is going to see GI.    On March 12, 2025 I summarized all pulmonary function test outlined an FVC of 1.57 L 67% digit with an FEV1 of 0.6 cm 37% predicted and an FEV1 percent of 43%.  After bronchodilators the FEV1 improved 26%.  0.85 L or 47%.  2.  The TLC was 102% but the the DLCO was 8.02 mL/min/mmHg.  Her airflow obstruction is clearly worse her DLCO is about the same.  She has more air trapping.  I am going to add budesonide nebulization 0.5 mg per 2 mL to her regimen twice daily.  She is also on Trelegy daily.  She will continue with albuterol as needed via HFA or nebulizer.  She will get back to me next month.    Currently, the patient reports that since Easter she has had increasing congestion and shortness of breath.  She has not had a lot of coughing or sputum production.  She has not had any aspiration.  She finds that she eats slowly she does better.  She has no swelling of her legs.  She continues on her Trelegy and albuterol which she is using twice a day.  She has a nebulizer at home.  She has no swelling of the legs.  She was thinking about going to the emergency room over the last several weeks but did not do so.  She stated that in February she went with shortness of breath and nothing was done.     RX Allergies[1]       Current Medications[2]          Review of Systems   Constitutional:  Negative for fatigue, fever and unexpected weight change.   HENT:  Negative for congestion, facial swelling, nosebleeds, postnasal drip, rhinorrhea, sinus  pressure and sinus pain.    Eyes:  Negative for discharge, redness and visual disturbance.   Respiratory:  Positive for shortness of breath. Negative for apnea, cough, choking, wheezing and stridor.    Cardiovascular:  Negative for chest pain, palpitations and leg swelling.   Gastrointestinal:  Negative for abdominal distention, abdominal pain, constipation and nausea.   Endocrine: Negative for cold intolerance and heat intolerance.   Genitourinary:  Negative for difficulty urinating, dysuria, frequency and hematuria.   Musculoskeletal:  Negative for arthralgias, gait problem and joint swelling.   Allergic/Immunologic: Negative for environmental allergies, food allergies and immunocompromised state.   Neurological:  Negative for dizziness, tremors, syncope, speech difficulty, weakness, light-headedness, numbness and headaches.   Hematological:  Negative for adenopathy. Does not bruise/bleed easily.   Psychiatric/Behavioral:  Negative for agitation, behavioral problems and sleep disturbance. The patient is not nervous/anxious.         Vitals:    04/28/25 1055   BP: 126/73   Pulse: 64   Resp: 18   Temp: 36.1 °C (97 °F)   SpO2: 93%        Physical Exam  Vitals reviewed.   Constitutional:       Appearance: Normal appearance.   HENT:      Head: Normocephalic and atraumatic.   Eyes:      Extraocular Movements: Extraocular movements intact.   Cardiovascular:      Rate and Rhythm: Normal rate and regular rhythm.      Heart sounds: No murmur heard.     No friction rub. No gallop.   Pulmonary:      Effort: Pulmonary effort is normal. No respiratory distress.      Breath sounds: Normal breath sounds. No stridor. No wheezing, rhonchi or rales.      Comments: Markedly diminished breath sounds with tachypnea around 20 to 22/min.  Minimal use of accessory muscles.  No rhonchi or wheezes heard  Chest:      Chest wall: No tenderness.   Abdominal:      General: Abdomen is flat. There is no distension.      Palpations: Abdomen is  soft. There is no mass.      Tenderness: There is no abdominal tenderness.   Musculoskeletal:         General: Normal range of motion.      Cervical back: Normal range of motion.      Right lower leg: No edema.      Left lower leg: No edema.   Skin:     General: Skin is warm and dry.   Neurological:      Mental Status: She is alert and oriented to person, place, and time.   Psychiatric:         Mood and Affect: Mood normal.         Behavior: Behavior normal.               [1] No Known Allergies  [2]   Current Outpatient Medications:     albuterol 2.5 mg /3 mL (0.083 %) nebulizer solution, Take 3 mL (2.5 mg) by nebulization every 6 hours., Disp: 1080 mL, Rfl: 3    albuterol sulfate (Proair Digihaler) 90 mcg/actuation aero powdr breath act w/sensor inhaler, Inhale 2 puffs every 6 hours if needed for wheezing., Disp: , Rfl:     allopurinol (Zyloprim) 100 mg tablet, Take 1 tablet (100 mg) by mouth once daily., Disp: 90 tablet, Rfl: 1    b complex 0.4 mg tablet, Take 1 tablet by mouth once daily., Disp: , Rfl:     budesonide (Pulmicort) 0.5 mg/2 mL nebulizer solution, Take 2 mL (0.5 mg) by nebulization 2 times a day. Rinse mouth with water after use to reduce aftertaste and incidence of candidiasis. Do not swallow., Disp: 120 mL, Rfl: 11    cholecalciferol, vitD3,/vit K2 (vitamin D3-vitamin K2) 125 mcg (5,000 unit)-100 mcg capsule, Take by mouth., Disp: , Rfl:     desonide (DesOwen) 0.05 % ointment, Apply topically 2 times a day. Apply as needed to the vaginal area, Disp: 15 g, Rfl: 0    DULoxetine (Cymbalta) 30 mg DR capsule, Take 1 capsule (30 mg) by mouth once daily., Disp: 90 capsule, Rfl: 1    fluocinonide (Lidex) 0.05 % external solution, Apply topically 2 times a day as needed for irritation or rash., Disp: 60 mL, Rfl: 1    fluticasone (Flonase) 50 mcg/actuation nasal spray, Administer 1 spray into each nostril once daily. Shake gently. Before first use, prime pump. After use, clean tip and replace cap., Disp: 16  g, Rfl: 3    gabapentin (Neurontin) 300 mg capsule, Take 1 capsule (300 mg) by mouth 2 times a day., Disp: 180 capsule, Rfl: 1    levothyroxine (Synthroid, Levoxyl) 150 mcg tablet, Take 1 tablet (150 mcg) by mouth early in the morning.. Take on an empty stomach at the same time each day, either 30 to 60 minutes prior to breakfast, Disp: 30 tablet, Rfl: 1    metoprolol succinate XL (Toprol-XL) 100 mg 24 hr tablet, Take 1 tablet (100 mg) by mouth once daily., Disp: 90 tablet, Rfl: 3    omeprazole (PriLOSEC) 40 mg DR capsule, Take 1 capsule (40 mg) by mouth once daily. Do not crush or chew., Disp: 90 capsule, Rfl: 1    oxybutynin (Ditropan) 5 mg tablet, TAKE 1 TABLET BY MOUTH ONCE  DAILY, Disp: 90 tablet, Rfl: 3    rosuvastatin (Crestor) 20 mg tablet, Take 1 tablet (20 mg) by mouth once daily., Disp: 90 tablet, Rfl: 3    Trelegy Ellipta 100-62.5-25 mcg blister with device, Inhale 1 puff once daily., Disp: , Rfl:     amoxicillin-clavulanate (Augmentin) 500-125 mg tablet, Take 1 tablet by mouth 2 times a day for 10 days., Disp: 20 tablet, Rfl: 0    azithromycin (Zithromax) 250 mg tablet, Take 2 by mouth today then 1 daily for 4 days, Disp: 6 tablet, Rfl: 0    predniSONE (Deltasone) 10 mg tablet, Take 4 tabs (40mg) daily for 3 days, then 3 tabs (30mg) daily for 3 days,  2 tabs (20mg) daily for 3 days,  1 tab (10 mg) daily for 3 days., Disp: 30 tablet, Rfl: 0    Current Facility-Administered Medications:     oxygen (O2) therapy, , inhalation, Continuous - Inhalation, Lori Bolivar MD, Last Rate: 120,000 mL/hr at 01/30/25 1133, 2 L/min at 01/30/25 1133

## 2025-04-29 ENCOUNTER — APPOINTMENT (OUTPATIENT)
Dept: RADIOLOGY | Facility: HOSPITAL | Age: 75
End: 2025-04-29
Payer: MEDICARE

## 2025-04-29 ENCOUNTER — HOSPITAL ENCOUNTER (EMERGENCY)
Facility: HOSPITAL | Age: 75
Discharge: HOME | End: 2025-04-29
Attending: INTERNAL MEDICINE
Payer: MEDICARE

## 2025-04-29 ENCOUNTER — APPOINTMENT (OUTPATIENT)
Dept: CARDIOLOGY | Facility: HOSPITAL | Age: 75
End: 2025-04-29
Payer: MEDICARE

## 2025-04-29 VITALS
TEMPERATURE: 98.1 F | WEIGHT: 156.9 LBS | DIASTOLIC BLOOD PRESSURE: 87 MMHG | SYSTOLIC BLOOD PRESSURE: 169 MMHG | RESPIRATION RATE: 20 BRPM | BODY MASS INDEX: 30.8 KG/M2 | OXYGEN SATURATION: 96 % | HEIGHT: 60 IN | HEART RATE: 90 BPM

## 2025-04-29 DIAGNOSIS — J18.9 ATYPICAL PNEUMONIA: ICD-10-CM

## 2025-04-29 DIAGNOSIS — R06.02 SOB (SHORTNESS OF BREATH): ICD-10-CM

## 2025-04-29 DIAGNOSIS — J44.1 COPD EXACERBATION (MULTI): Primary | ICD-10-CM

## 2025-04-29 LAB
ALBUMIN SERPL BCP-MCNC: 4.7 G/DL (ref 3.4–5)
ALP SERPL-CCNC: 67 U/L (ref 33–136)
ALT SERPL W P-5'-P-CCNC: 23 U/L (ref 7–45)
ANION GAP SERPL CALC-SCNC: 16 MMOL/L (ref 10–20)
AST SERPL W P-5'-P-CCNC: 39 U/L (ref 9–39)
BASOPHILS # BLD AUTO: 0.02 X10*3/UL (ref 0–0.1)
BASOPHILS NFR BLD AUTO: 0.3 %
BILIRUB SERPL-MCNC: 0.8 MG/DL (ref 0–1.2)
BUN SERPL-MCNC: 11 MG/DL (ref 6–23)
CALCIUM SERPL-MCNC: 10.2 MG/DL (ref 8.6–10.3)
CARDIAC TROPONIN I PNL SERPL HS: 9 NG/L (ref 0–13)
CHLORIDE SERPL-SCNC: 99 MMOL/L (ref 98–107)
CO2 SERPL-SCNC: 33 MMOL/L (ref 21–32)
CREAT SERPL-MCNC: 0.68 MG/DL (ref 0.5–1.05)
EGFRCR SERPLBLD CKD-EPI 2021: >90 ML/MIN/1.73M*2
EOSINOPHIL # BLD AUTO: 0.26 X10*3/UL (ref 0–0.4)
EOSINOPHIL NFR BLD AUTO: 3.3 %
ERYTHROCYTE [DISTWIDTH] IN BLOOD BY AUTOMATED COUNT: 13.3 % (ref 11.5–14.5)
GLUCOSE SERPL-MCNC: 80 MG/DL (ref 74–99)
HCT VFR BLD AUTO: 42.3 % (ref 36–46)
HGB BLD-MCNC: 13.6 G/DL (ref 12–16)
IMM GRANULOCYTES # BLD AUTO: 0.03 X10*3/UL (ref 0–0.5)
IMM GRANULOCYTES NFR BLD AUTO: 0.4 % (ref 0–0.9)
LYMPHOCYTES # BLD AUTO: 0.9 X10*3/UL (ref 0.8–3)
LYMPHOCYTES NFR BLD AUTO: 11.6 %
MCH RBC QN AUTO: 29.7 PG (ref 26–34)
MCHC RBC AUTO-ENTMCNC: 32.2 G/DL (ref 32–36)
MCV RBC AUTO: 92 FL (ref 80–100)
MONOCYTES # BLD AUTO: 0.5 X10*3/UL (ref 0.05–0.8)
MONOCYTES NFR BLD AUTO: 6.4 %
NEUTROPHILS # BLD AUTO: 6.06 X10*3/UL (ref 1.6–5.5)
NEUTROPHILS NFR BLD AUTO: 78 %
NRBC BLD-RTO: 0 /100 WBCS (ref 0–0)
PLATELET # BLD AUTO: 192 X10*3/UL (ref 150–450)
POTASSIUM SERPL-SCNC: 3.2 MMOL/L (ref 3.5–5.3)
PROT SERPL-MCNC: 7.7 G/DL (ref 6.4–8.2)
RBC # BLD AUTO: 4.58 X10*6/UL (ref 4–5.2)
SODIUM SERPL-SCNC: 145 MMOL/L (ref 136–145)
WBC # BLD AUTO: 7.8 X10*3/UL (ref 4.4–11.3)

## 2025-04-29 PROCEDURE — 99285 EMERGENCY DEPT VISIT HI MDM: CPT | Mod: 25 | Performed by: INTERNAL MEDICINE

## 2025-04-29 PROCEDURE — 2500000002 HC RX 250 W HCPCS SELF ADMINISTERED DRUGS (ALT 637 FOR MEDICARE OP, ALT 636 FOR OP/ED): Performed by: EMERGENCY MEDICINE

## 2025-04-29 PROCEDURE — 36415 COLL VENOUS BLD VENIPUNCTURE: CPT | Performed by: EMERGENCY MEDICINE

## 2025-04-29 PROCEDURE — 2500000004 HC RX 250 GENERAL PHARMACY W/ HCPCS (ALT 636 FOR OP/ED): Performed by: EMERGENCY MEDICINE

## 2025-04-29 PROCEDURE — 84484 ASSAY OF TROPONIN QUANT: CPT | Performed by: EMERGENCY MEDICINE

## 2025-04-29 PROCEDURE — 93005 ELECTROCARDIOGRAM TRACING: CPT

## 2025-04-29 PROCEDURE — 2500000001 HC RX 250 WO HCPCS SELF ADMINISTERED DRUGS (ALT 637 FOR MEDICARE OP): Performed by: EMERGENCY MEDICINE

## 2025-04-29 PROCEDURE — 84075 ASSAY ALKALINE PHOSPHATASE: CPT | Performed by: EMERGENCY MEDICINE

## 2025-04-29 PROCEDURE — 71046 X-RAY EXAM CHEST 2 VIEWS: CPT

## 2025-04-29 PROCEDURE — 85025 COMPLETE CBC W/AUTO DIFF WBC: CPT | Performed by: EMERGENCY MEDICINE

## 2025-04-29 PROCEDURE — 94640 AIRWAY INHALATION TREATMENT: CPT

## 2025-04-29 PROCEDURE — 71046 X-RAY EXAM CHEST 2 VIEWS: CPT | Performed by: RADIOLOGY

## 2025-04-29 PROCEDURE — 2500000002 HC RX 250 W HCPCS SELF ADMINISTERED DRUGS (ALT 637 FOR MEDICARE OP, ALT 636 FOR OP/ED)

## 2025-04-29 RX ORDER — POTASSIUM CHLORIDE 20 MEQ/1
40 TABLET, EXTENDED RELEASE ORAL ONCE
Status: COMPLETED | OUTPATIENT
Start: 2025-04-29 | End: 2025-04-29

## 2025-04-29 RX ORDER — AMOXICILLIN AND CLAVULANATE POTASSIUM 875; 125 MG/1; MG/1
1 TABLET, FILM COATED ORAL ONCE
Status: COMPLETED | OUTPATIENT
Start: 2025-04-29 | End: 2025-04-29

## 2025-04-29 RX ORDER — IPRATROPIUM BROMIDE AND ALBUTEROL SULFATE 2.5; .5 MG/3ML; MG/3ML
3 SOLUTION RESPIRATORY (INHALATION) ONCE
Status: COMPLETED | OUTPATIENT
Start: 2025-04-29 | End: 2025-04-29

## 2025-04-29 RX ORDER — AZITHROMYCIN 500 MG/1
250 TABLET, FILM COATED ORAL ONCE
Status: COMPLETED | OUTPATIENT
Start: 2025-04-29 | End: 2025-04-29

## 2025-04-29 RX ORDER — PREDNISONE 10 MG/1
TABLET ORAL
Qty: 30 TABLET | Refills: 0 | Status: SHIPPED | OUTPATIENT
Start: 2025-04-29 | End: 2025-05-29

## 2025-04-29 RX ADMIN — PREDNISONE 50 MG: 10 TABLET ORAL at 17:51

## 2025-04-29 RX ADMIN — IPRATROPIUM BROMIDE AND ALBUTEROL SULFATE 3 ML: 2.5; .5 SOLUTION RESPIRATORY (INHALATION) at 17:51

## 2025-04-29 RX ADMIN — AZITHROMYCIN DIHYDRATE 250 MG: 500 TABLET ORAL at 17:51

## 2025-04-29 RX ADMIN — AMOXICILLIN AND CLAVULANATE POTASSIUM 1 TABLET: 875; 125 TABLET, COATED ORAL at 17:51

## 2025-04-29 RX ADMIN — POTASSIUM CHLORIDE 40 MEQ: 1500 TABLET, EXTENDED RELEASE ORAL at 19:56

## 2025-04-29 ASSESSMENT — PAIN - FUNCTIONAL ASSESSMENT: PAIN_FUNCTIONAL_ASSESSMENT: 0-10

## 2025-04-29 ASSESSMENT — PAIN SCALES - GENERAL: PAINLEVEL_OUTOF10: 0 - NO PAIN

## 2025-04-29 NOTE — ED PROVIDER NOTES
Emergency Department Provider Note        History of Present Illness     History provided by: Patient  Limitations to History: None  External Records Reviewed with Brief Summary: Outpatient progress note from 4/28/2025 which showed pulmonology visit for shortness of breath    HPI:  Breonna Greenberg is a 74 y.o. female with PMHx COPD on 2L NC PRN at home, hypothyroidism, NSCLC s/p resection/chemotherapy who presents emergency department with shortness of breath.  Reports she was diagnosed with pneumonia on outpatient CT scan and prescribed antibiotics which she began to take.  She reports her pharmacy was out of the prescribed steroids so was unable to pick these up.  Today had worsening shortness of breath, started herself on 3L NC of her home oxygen which she typically does not use during the day.    Denies fevers, chest pain, back pain, nausea/vomiting, urinary symptoms, leg swelling.    CT PE from 4/28/2025 reviewed with no evidence of PE, there was consolidation within the lingula and RML concerning for atypical pneumonia versus scarring.    Physical Exam   Triage vitals:  T 36.7 °C (98.1 °F)  HR 84  /89  RR 17  O2 95 % Supplemental oxygen    Physical exam:   Triage vitals reviewed.  Constitutional: Well developed adult in no acute distress, non toxic in appearance  Head: Normocephalic, atraumatic  Skin: Intact, dry. No rashes or lesions.  Eyes: Pupils are equal. No conjunctival injection.  Neck: Supple. Trachea is midline.  Pulmonary: SpO2 98% on 3L NC.  Mild tachypnea, RR 22.  Able to speak in full sentences.  Diminished breath sounds throughout; scattered expiratory wheeze.  Cardiovascular: Normal rate, regular rhythm. No murmurs/gallops/rubs appreciated. 2+ radial pulses bilaterally.  No pedal edema  Abdomen: Soft, nondistended. Nontender to palpation.  Extremities: No gross deformities.  Moving all extremities spontaneously without difficulty.  Neuro: Awake and alert. Face is symmetric. Speech is  clear. No obvious focal findings.      Medical Decision Making & ED Course   Medical Decision Makin y.o. female with PMHx COPD on 2L NC as needed at home, NSCLC s/p resection/chemotherapy who presents to ED with shortness of breath in setting of recently diagnosed pneumonia.  On arrival patient was afebrile, normotensive, HR 84, SpO2 97% on 3L NC of her home O2.  Titrated O2 down to 2 L with SpO2 maintained at 96%.  Patient ordered Augmentin, azithromycin for atypical pneumonia.  Also ordered DuoNebs and prednisone.    Clinical presentation most consistent with COPD exacerbation in the setting of newly diagnosed pneumonia.  CXR without overt evidence of pneumonia, no evidence of pulmonary edema.  EKG nonischemic, will obtain troponin to rule out ACS but this is less likely given no chest pain and clear alternative explanation for patient's shortness of breath.  Given CT PE yesterday with no evidence of pulmonary embolus, I have low suspicion for new onset of PE and I do not believe repeat CT PE is indicated at this time.  Low suspicion for sepsis as patient does not have leukocytosis or evidence of endorgan dysfunction, saturating appropriately on her home PRN O2.    Given reassuring laboratory workup and vital signs, will treat with steroids, DuoNebs and reassess.      Patient shortness of breath improved after DuoNebs and prednisone.  No desaturation with walking pulse ox on home PRN O2.  Patient was able to confirm that [facility] in [place] will have the prednisone ordered by her pulmonologist Dr. Field.  This prescription was transferred to the [facility] per patient's request.  Reviewed return precautions including chest pain, increasing shortness of breath, or new concerns.    ----    Differential diagnoses considered include but are not limited to: Atypical pneumonia, COPD exacerbation, sepsis, acute coronary syndrome, pulmonary embolus     Social Determinants of Health which Significantly Impact Care:  None identified     EKG Independent Interpretation: EKG interpreted by myself. Please see ED Course for full interpretation.    Independent Result Review and Interpretation: Relevant laboratory and radiographic results were reviewed and independently interpreted by myself.  As necessary, they are commented on in the ED Course.    Chronic conditions affecting the patient's care: As documented above in [facility]    The patient was discussed with the following consultants/services: None    Care Considerations: As documented above in [facility]    ED Course:  ED Course as of 04/29/25 1827   e Apr 29, 2025   1811 ECG 12 lead  EKG interpreted 1417 and interpreted by me: Normal sinus rhythm with a normal axis.  Normal NJ and QRS intervals, no QTc prolongation.  No ST elevations or depressions concerning for ischemia.  Patient does have T wave inversions in leads III and aVF.  Compared with prior EKG on 1/30/2025, there is no significant change, TWI in inferior leads present on previous. [HH]   1811 XR chest 2 views  CXR independently reviewed without evidence of PTX, consolidation, or widened mediastinum [HH]      ED Course User Index  [HH] Glenys Finn MD         Diagnoses as of 04/29/25 1827   COPD exacerbation (Multi)   Atypical pneumonia     Disposition   As a result of the work-up, the patient was discharged home.  She was informed of her diagnosis and instructed to come back with any concerns or worsening of condition.  She was agreeable to the plan as discussed above.  She was given the opportunity to ask questions.  All of the patient's questions were answered.      Patient seen and discussed with ED attending physician.    Glenys Finn MD  Emergency Medicine     Glenys Finn MD  Resident  04/29/25 [id number]

## 2025-04-29 NOTE — DISCHARGE INSTRUCTIONS
You were seen in the ER for COPD exacerbation in the setting of pneumonia.  You were given a nebulizer treatment and steroids.  At home, you should continue your your course of antibiotics.  Your prednisone course as prescribed by Dr. Field has been transferred to the [facility] in [place] as requested.  Please come back to the emergency department if you have worsening shortness of breath, chest pain, fever/chills, or new concerns.

## 2025-04-29 NOTE — ED TRIAGE NOTES
Patient having increased SOB with recent dx of Pneumonia on  a Chest CT. She was sent home with antibiotics and just started them last night. She was also supposed to get steroids but the pharmacy was out yesterday. Patient reports dizziness and fatigue with HA. Difficulty finding her breath. Patient has COPD and emphysema. Patient denies N, V, D, chills, fever.

## 2025-04-29 NOTE — ED TRIAGE NOTES
" TRIAGE NOTE   I saw the patient as the Clinician in Triage and performed a brief history and physical exam, established acuity, and ordered appropriate tests to develop basic plan of care. Patient will be seen by an ANGELINA, resident and/or physician who will independently evaluate the patient. Please see subsequent provider notes for further details and disposition.     Brief HPI: In brief, Breonna Greenberg \"Millicent" is a 74 y.o. female that presents for shortness of breath and cough and fatigue.  She was evaluated by her pulmonologist Dr. Field yesterday and CT of the chest demonstrated no pulmonary emboli but did show groundglass infiltrates.  Prescribed Augmentin and azithromycin with prednisone taper for suspected pneumonia and possible COPD exacerbation.  Patient states that pharmacy did not have prednisone available.  She also describes fatigue and lightheadedness dizziness for the past several days.  Typically wears oxygen 2 L at night only but is wearing oxygen currently for comfort.  Owns a cane and a walker though she is currently sitting in a wheelchair.  States that she was able to ambulate at home with her walker earlier today.    Focused Physical exam:   Sitting in a wheelchair.  Oxygen by nasal cannula.  Diminished breath sounds throughout.  No significant wheezing.  No conversational dyspnea.  Face symmetric.  No aphasia or dysarthria.  Full strength upper and lower extremities 5/5.  Sensation light touch intact.  NIH stroke scale 0.    Plan/MDM:   Augmentin and azithromycin for suspected pneumonia based on CT performed yesterday.  Prednisone and DuoNeb for possible COPD exacerbation.  Suspect most likely pneumonia and COPD exacerbation.  Chest x-ray and labs and EKG to evaluate for other causes.    Please see subsequent provider note for further details and disposition     "

## 2025-05-01 LAB
ATRIAL RATE: 78 BPM
P AXIS: 58 DEGREES
P OFFSET: 212 MS
P ONSET: 159 MS
PR INTERVAL: 134 MS
Q ONSET: 226 MS
QRS COUNT: 13 BEATS
QRS DURATION: 74 MS
QT INTERVAL: 404 MS
QTC CALCULATION(BAZETT): 460 MS
QTC FREDERICIA: 441 MS
R AXIS: -6 DEGREES
T AXIS: 5 DEGREES
T OFFSET: 428 MS
VENTRICULAR RATE: 78 BPM

## 2025-05-02 ENCOUNTER — APPOINTMENT (OUTPATIENT)
Dept: CARDIOLOGY | Facility: CLINIC | Age: 75
End: 2025-05-02
Payer: MEDICARE

## 2025-05-09 ENCOUNTER — HOSPITAL ENCOUNTER (OUTPATIENT)
Dept: CARDIOLOGY | Facility: CLINIC | Age: 75
Discharge: HOME | End: 2025-05-09
Payer: MEDICARE

## 2025-05-09 DIAGNOSIS — R06.09 DYSPNEA ON EXERTION: ICD-10-CM

## 2025-05-09 LAB
AORTIC VALVE PEAK VELOCITY: 1.25 M/S
AV PEAK GRADIENT: 6 MMHG
AVA (PEAK VEL): 1.95 CM2
EJECTION FRACTION APICAL 4 CHAMBER: 59.1
EJECTION FRACTION: 60 %
LEFT ATRIUM VOLUME AREA LENGTH INDEX BSA: 29.2 ML/M2
LEFT VENTRICLE INTERNAL DIMENSION DIASTOLE: 3.69 CM (ref 3.5–6)
LEFT VENTRICULAR OUTFLOW TRACT DIAMETER: 1.8 CM
MITRAL VALVE E/A RATIO: 0.87
RIGHT VENTRICLE FREE WALL PEAK S': 11.65 CM/S
RIGHT VENTRICLE PEAK SYSTOLIC PRESSURE: 37.5 MMHG
TRICUSPID ANNULAR PLANE SYSTOLIC EXCURSION: 1.8 CM

## 2025-05-09 PROCEDURE — 93306 TTE W/DOPPLER COMPLETE: CPT | Performed by: INTERNAL MEDICINE

## 2025-05-09 PROCEDURE — 93306 TTE W/DOPPLER COMPLETE: CPT

## 2025-05-14 ENCOUNTER — APPOINTMENT (OUTPATIENT)
Dept: PRIMARY CARE | Facility: CLINIC | Age: 75
End: 2025-05-14
Payer: COMMERCIAL

## 2025-05-18 DIAGNOSIS — M10.9 GOUT, UNSPECIFIED CAUSE, UNSPECIFIED CHRONICITY, UNSPECIFIED SITE: ICD-10-CM

## 2025-05-18 DIAGNOSIS — M79.7 FIBROMYALGIA: ICD-10-CM

## 2025-05-18 DIAGNOSIS — R13.19 ESOPHAGEAL DYSPHAGIA: ICD-10-CM

## 2025-05-20 RX ORDER — GABAPENTIN 300 MG/1
300 CAPSULE ORAL 2 TIMES DAILY
Qty: 200 CAPSULE | Refills: 2 | Status: SHIPPED | OUTPATIENT
Start: 2025-05-20

## 2025-05-20 RX ORDER — OMEPRAZOLE 40 MG/1
40 CAPSULE, DELAYED RELEASE ORAL DAILY
Qty: 100 CAPSULE | Refills: 2 | Status: SHIPPED | OUTPATIENT
Start: 2025-05-20

## 2025-05-20 RX ORDER — ALLOPURINOL 100 MG/1
100 TABLET ORAL DAILY
Qty: 100 TABLET | Refills: 2 | Status: SHIPPED | OUTPATIENT
Start: 2025-05-20

## 2025-05-29 ENCOUNTER — TELEPHONE (OUTPATIENT)
Dept: CARDIOLOGY | Facility: CLINIC | Age: 75
End: 2025-05-29
Payer: MEDICARE

## 2025-05-29 NOTE — TELEPHONE ENCOUNTER
Called patient and let her know per Dr. Carroll echocardiogram looks good with patient on metoprolol.

## 2025-06-04 ENCOUNTER — DOCUMENTATION (OUTPATIENT)
Dept: PULMONOLOGY | Facility: HOSPITAL | Age: 75
End: 2025-06-04
Payer: MEDICARE

## 2025-06-04 ENCOUNTER — HOSPITAL ENCOUNTER (EMERGENCY)
Facility: HOSPITAL | Age: 75
Discharge: HOME | End: 2025-06-04
Attending: INTERNAL MEDICINE
Payer: MEDICARE

## 2025-06-04 ENCOUNTER — APPOINTMENT (OUTPATIENT)
Dept: RADIOLOGY | Facility: HOSPITAL | Age: 75
End: 2025-06-04
Payer: MEDICARE

## 2025-06-04 ENCOUNTER — APPOINTMENT (OUTPATIENT)
Dept: CARDIOLOGY | Facility: HOSPITAL | Age: 75
End: 2025-06-04
Payer: MEDICARE

## 2025-06-04 ENCOUNTER — TELEPHONE (OUTPATIENT)
Dept: PULMONOLOGY | Facility: CLINIC | Age: 75
End: 2025-06-04
Payer: MEDICARE

## 2025-06-04 VITALS
RESPIRATION RATE: 19 BRPM | HEART RATE: 82 BPM | DIASTOLIC BLOOD PRESSURE: 81 MMHG | WEIGHT: 155 LBS | TEMPERATURE: 98.2 F | SYSTOLIC BLOOD PRESSURE: 137 MMHG | HEIGHT: 60 IN | BODY MASS INDEX: 30.43 KG/M2 | OXYGEN SATURATION: 96 %

## 2025-06-04 DIAGNOSIS — J44.1 COPD EXACERBATION (MULTI): Primary | ICD-10-CM

## 2025-06-04 DIAGNOSIS — J20.9 ACUTE BRONCHITIS, UNSPECIFIED ORGANISM: ICD-10-CM

## 2025-06-04 LAB
ALBUMIN SERPL BCP-MCNC: 3.9 G/DL (ref 3.4–5)
ALP SERPL-CCNC: 77 U/L (ref 33–136)
ALT SERPL W P-5'-P-CCNC: 13 U/L (ref 7–45)
ANION GAP BLDV CALCULATED.4IONS-SCNC: 5 MMOL/L (ref 10–25)
ANION GAP SERPL CALC-SCNC: 13 MMOL/L (ref 10–20)
AST SERPL W P-5'-P-CCNC: 26 U/L (ref 9–39)
BASE EXCESS BLDV CALC-SCNC: 8.6 MMOL/L (ref -2–3)
BASOPHILS # BLD AUTO: 0.04 X10*3/UL (ref 0–0.1)
BASOPHILS NFR BLD AUTO: 0.6 %
BILIRUB SERPL-MCNC: 0.5 MG/DL (ref 0–1.2)
BNP SERPL-MCNC: 155 PG/ML (ref 0–99)
BODY TEMPERATURE: 37 DEGREES CELSIUS
BUN SERPL-MCNC: 14 MG/DL (ref 6–23)
CA-I BLDV-SCNC: 1.21 MMOL/L (ref 1.1–1.33)
CALCIUM SERPL-MCNC: 9.1 MG/DL (ref 8.6–10.3)
CARDIAC TROPONIN I PNL SERPL HS: 8 NG/L (ref 0–13)
CARDIAC TROPONIN I PNL SERPL HS: 8 NG/L (ref 0–13)
CHLORIDE BLDV-SCNC: 100 MMOL/L (ref 98–107)
CHLORIDE SERPL-SCNC: 100 MMOL/L (ref 98–107)
CO2 SERPL-SCNC: 31 MMOL/L (ref 21–32)
CREAT SERPL-MCNC: 0.94 MG/DL (ref 0.5–1.05)
EGFRCR SERPLBLD CKD-EPI 2021: 64 ML/MIN/1.73M*2
EOSINOPHIL # BLD AUTO: 0.2 X10*3/UL (ref 0–0.4)
EOSINOPHIL NFR BLD AUTO: 2.8 %
ERYTHROCYTE [DISTWIDTH] IN BLOOD BY AUTOMATED COUNT: 13.1 % (ref 11.5–14.5)
FLUAV RNA RESP QL NAA+PROBE: NOT DETECTED
FLUBV RNA RESP QL NAA+PROBE: NOT DETECTED
GLUCOSE BLDV-MCNC: 167 MG/DL (ref 74–99)
GLUCOSE SERPL-MCNC: 154 MG/DL (ref 74–99)
HCO3 BLDV-SCNC: 35.3 MMOL/L (ref 22–26)
HCT VFR BLD AUTO: 36.6 % (ref 36–46)
HCT VFR BLD EST: 38 % (ref 36–46)
HGB BLD-MCNC: 11.9 G/DL (ref 12–16)
HGB BLDV-MCNC: 12.8 G/DL (ref 12–16)
IMM GRANULOCYTES # BLD AUTO: 0.09 X10*3/UL (ref 0–0.5)
IMM GRANULOCYTES NFR BLD AUTO: 1.3 % (ref 0–0.9)
INHALED O2 CONCENTRATION: 21 %
LACTATE BLDV-SCNC: 2 MMOL/L (ref 0.4–2)
LACTATE BLDV-SCNC: 3.1 MMOL/L (ref 0.4–2)
LYMPHOCYTES # BLD AUTO: 1.29 X10*3/UL (ref 0.8–3)
LYMPHOCYTES NFR BLD AUTO: 18 %
MAGNESIUM SERPL-MCNC: 1.79 MG/DL (ref 1.6–2.4)
MCH RBC QN AUTO: 30.5 PG (ref 26–34)
MCHC RBC AUTO-ENTMCNC: 32.5 G/DL (ref 32–36)
MCV RBC AUTO: 94 FL (ref 80–100)
MONOCYTES # BLD AUTO: 0.63 X10*3/UL (ref 0.05–0.8)
MONOCYTES NFR BLD AUTO: 8.8 %
NEUTROPHILS # BLD AUTO: 4.91 X10*3/UL (ref 1.6–5.5)
NEUTROPHILS NFR BLD AUTO: 68.5 %
NRBC BLD-RTO: 0 /100 WBCS (ref 0–0)
OXYHGB MFR BLDV: 44.1 % (ref 45–75)
PCO2 BLDV: 57 MM HG (ref 41–51)
PH BLDV: 7.4 PH (ref 7.33–7.43)
PLATELET # BLD AUTO: 175 X10*3/UL (ref 150–450)
PO2 BLDV: 29 MM HG (ref 35–45)
POTASSIUM BLDV-SCNC: 3.2 MMOL/L (ref 3.5–5.3)
POTASSIUM SERPL-SCNC: 3.2 MMOL/L (ref 3.5–5.3)
PROT SERPL-MCNC: 6.4 G/DL (ref 6.4–8.2)
RBC # BLD AUTO: 3.9 X10*6/UL (ref 4–5.2)
SAO2 % BLDV: 45 % (ref 45–75)
SARS-COV-2 RNA RESP QL NAA+PROBE: NOT DETECTED
SODIUM BLDV-SCNC: 137 MMOL/L (ref 136–145)
SODIUM SERPL-SCNC: 141 MMOL/L (ref 136–145)
WBC # BLD AUTO: 7.2 X10*3/UL (ref 4.4–11.3)

## 2025-06-04 PROCEDURE — 36415 COLL VENOUS BLD VENIPUNCTURE: CPT | Performed by: INTERNAL MEDICINE

## 2025-06-04 PROCEDURE — 93005 ELECTROCARDIOGRAM TRACING: CPT

## 2025-06-04 PROCEDURE — 83880 ASSAY OF NATRIURETIC PEPTIDE: CPT | Performed by: INTERNAL MEDICINE

## 2025-06-04 PROCEDURE — 83735 ASSAY OF MAGNESIUM: CPT | Performed by: INTERNAL MEDICINE

## 2025-06-04 PROCEDURE — 94640 AIRWAY INHALATION TREATMENT: CPT

## 2025-06-04 PROCEDURE — 2550000001 HC RX 255 CONTRASTS: Performed by: INTERNAL MEDICINE

## 2025-06-04 PROCEDURE — 84484 ASSAY OF TROPONIN QUANT: CPT | Performed by: INTERNAL MEDICINE

## 2025-06-04 PROCEDURE — 85025 COMPLETE CBC W/AUTO DIFF WBC: CPT | Performed by: INTERNAL MEDICINE

## 2025-06-04 PROCEDURE — 87636 SARSCOV2 & INF A&B AMP PRB: CPT | Performed by: INTERNAL MEDICINE

## 2025-06-04 PROCEDURE — 96374 THER/PROPH/DIAG INJ IV PUSH: CPT

## 2025-06-04 PROCEDURE — 71046 X-RAY EXAM CHEST 2 VIEWS: CPT

## 2025-06-04 PROCEDURE — 2500000004 HC RX 250 GENERAL PHARMACY W/ HCPCS (ALT 636 FOR OP/ED): Mod: JZ | Performed by: INTERNAL MEDICINE

## 2025-06-04 PROCEDURE — 84132 ASSAY OF SERUM POTASSIUM: CPT | Performed by: INTERNAL MEDICINE

## 2025-06-04 PROCEDURE — 99285 EMERGENCY DEPT VISIT HI MDM: CPT | Mod: 25 | Performed by: INTERNAL MEDICINE

## 2025-06-04 PROCEDURE — 2500000001 HC RX 250 WO HCPCS SELF ADMINISTERED DRUGS (ALT 637 FOR MEDICARE OP): Performed by: INTERNAL MEDICINE

## 2025-06-04 PROCEDURE — 83605 ASSAY OF LACTIC ACID: CPT | Performed by: INTERNAL MEDICINE

## 2025-06-04 PROCEDURE — 2500000002 HC RX 250 W HCPCS SELF ADMINISTERED DRUGS (ALT 637 FOR MEDICARE OP, ALT 636 FOR OP/ED): Performed by: INTERNAL MEDICINE

## 2025-06-04 PROCEDURE — 71046 X-RAY EXAM CHEST 2 VIEWS: CPT | Performed by: RADIOLOGY

## 2025-06-04 PROCEDURE — 71275 CT ANGIOGRAPHY CHEST: CPT

## 2025-06-04 PROCEDURE — 80053 COMPREHEN METABOLIC PANEL: CPT | Performed by: INTERNAL MEDICINE

## 2025-06-04 RX ORDER — POTASSIUM CHLORIDE 1.5 G/1.58G
40 POWDER, FOR SOLUTION ORAL ONCE
Status: COMPLETED | OUTPATIENT
Start: 2025-06-04 | End: 2025-06-04

## 2025-06-04 RX ORDER — IPRATROPIUM BROMIDE AND ALBUTEROL SULFATE 2.5; .5 MG/3ML; MG/3ML
3 SOLUTION RESPIRATORY (INHALATION) EVERY 20 MIN
Status: COMPLETED | OUTPATIENT
Start: 2025-06-04 | End: 2025-06-04

## 2025-06-04 RX ORDER — AZITHROMYCIN 250 MG/1
TABLET, FILM COATED ORAL
Qty: 6 TABLET | Refills: 0 | Status: SHIPPED | OUTPATIENT
Start: 2025-06-04 | End: 2025-06-09

## 2025-06-04 RX ORDER — PREDNISONE 10 MG/1
20 TABLET ORAL DAILY
Qty: 10 TABLET | Refills: 0 | Status: SHIPPED | OUTPATIENT
Start: 2025-06-04 | End: 2025-06-09

## 2025-06-04 RX ADMIN — IOHEXOL 75 ML: 350 INJECTION, SOLUTION INTRAVENOUS at 17:47

## 2025-06-04 RX ADMIN — METHYLPREDNISOLONE SODIUM SUCCINATE 125 MG: 125 INJECTION, POWDER, FOR SOLUTION INTRAMUSCULAR; INTRAVENOUS at 17:35

## 2025-06-04 RX ADMIN — IPRATROPIUM BROMIDE AND ALBUTEROL SULFATE 3 ML: 2.5; .5 SOLUTION RESPIRATORY (INHALATION) at 18:08

## 2025-06-04 RX ADMIN — IPRATROPIUM BROMIDE AND ALBUTEROL SULFATE 3 ML: 2.5; .5 SOLUTION RESPIRATORY (INHALATION) at 18:10

## 2025-06-04 RX ADMIN — POTASSIUM CHLORIDE 40 MEQ: 1.5 POWDER, FOR SOLUTION ORAL at 18:45

## 2025-06-04 RX ADMIN — IPRATROPIUM BROMIDE AND ALBUTEROL SULFATE 3 ML: 2.5; .5 SOLUTION RESPIRATORY (INHALATION) at 18:11

## 2025-06-04 ASSESSMENT — PAIN - FUNCTIONAL ASSESSMENT: PAIN_FUNCTIONAL_ASSESSMENT: 0-10

## 2025-06-04 NOTE — ED TRIAGE NOTES
Pt to the ED from home for SOB over the past few days with productive cough, pt recently had pneumonia and finished course of oral abx, pt has hx of COPD and lung CA with partial lung resection,pt wears 2L NC as needed,

## 2025-06-04 NOTE — TELEPHONE ENCOUNTER
Patient sts after taking 5-10 steps she is out of breath. Patient doesn't believe she has pneumonia, but is requesting a call back.

## 2025-06-04 NOTE — PROGRESS NOTES
Patient is more short of breath walking 5-10 steps.  She is coughing up sputum that is blood-streaked.  Her saturations have been dropping into the 70s.  Under the circumstances she probably needs to go to the emergency room for further evaluation including a CT angiogram.

## 2025-06-04 NOTE — ED PROVIDER NOTES
HPI     CC: Shortness of Breath     HPI: Breonna Greenberg is a 74 y.o. female with a history of COPD on 2L NC as needed at home, hypothyroidism, NSCLC s/p resection/chemo, hyperdynamic LV with outflow obstruction, recurrent pneumonia, who presents with shortness of breath.  Symptoms started a week ago.  She gets short of breath with even slight exertion walking down 2 steps.  She has a cough productive of white sputum occasionally blood-tinged.  She denies any fevers or chills.  She denies chest pain.  She denies leg pain or swelling.  She has been using her rescue inhaler every few hours in addition to her nebulizer few times a day.  This feels somewhat like her prior pneumonias.  She called Dr. Field, her pulmonologist, who suggested she may have a PE.  She has had to use her home oxygen continuously over the past week even though she usually only uses it at night.  Pulse ox has been as low as the 70s over the past few days.    ROS: 10-point review of systems was performed and is otherwise negative except as noted in HPI.    Limitations to history: N/A    Independent Historians: N/A    External Records Reviewed: Outpatient notes in EMR    Past Medical History: Noncontributory except per HPI     Past Surgical History: Noncontributory except per HPI     Family History: Reviewed and noncontributory     Social History:  Denies tobacco. Denies ETOH. Denies illicit drugs.    Social Determinants Affecting Care: N/A    RX Allergies[1]    Home Meds:   Current Outpatient Medications   Medication Instructions    albuterol sulfate (Proair Digihaler) 90 mcg/actuation aero powdr breath act w/sensor inhaler 2 puffs, Every 6 hours PRN    albuterol 2.5 mg, nebulization, Every 6 hours    allopurinol (ZYLOPRIM) 100 mg, oral, Daily    azithromycin (Zithromax) 250 mg tablet Take 2 tablets (500 mg) by mouth once daily for 1 day, THEN 1 tablet (250 mg) once daily for 4 days.    b complex 0.4 mg tablet 1 tablet, Daily    budesonide  (PULMICORT) 0.5 mg, nebulization, 2 times daily, Rinse mouth with water after use to reduce aftertaste and incidence of candidiasis. Do not swallow.    cholecalciferol, vitD3,/vit K2 (vitamin D3-vitamin K2) 125 mcg (5,000 unit)-100 mcg capsule Take by mouth.    desonide (DesOwen) 0.05 % ointment Topical, 2 times daily, Apply as needed to the vaginal area    DULoxetine (CYMBALTA) 30 mg, oral, Daily    fluocinonide (Lidex) 0.05 % external solution Topical, 2 times daily PRN    fluticasone (Flonase) 50 mcg/actuation nasal spray 1 spray, Each Nostril, Daily, Shake gently. Before first use, prime pump. After use, clean tip and replace cap.    gabapentin (NEURONTIN) 300 mg, oral, 2 times daily    levothyroxine (SYNTHROID, LEVOXYL) 150 mcg, oral, Daily, Take on an empty stomach at the same time each day, either 30 to 60 minutes prior to breakfast    metoprolol succinate XL (TOPROL-XL) 100 mg, oral, Daily    omeprazole (PRILOSEC) 40 mg, oral, Daily, (DO NOT CRUSH OR CHEW)    oxyBUTYnin (DITROPAN) 5 mg, oral, Daily    predniSONE (DELTASONE) 20 mg, oral, Daily    rosuvastatin (CRESTOR) 20 mg, oral, Daily    Trelegy Ellipta 100-62.5-25 mcg blister with device 1 puff, Daily RT        Physical Exam     ED Triage Vitals [06/04/25 1627]   Temperature Heart Rate Respirations BP   36.8 °C (98.2 °F) 79 14 140/76      Pulse Ox Temp Source Heart Rate Source Patient Position   (!) 92 % Oral -- --      BP Location FiO2 (%)     -- --         Heart Rate:  [70-85]   Temperature:  [36.8 °C (98.2 °F)]   Respirations:  [14-20]   BP: (126-161)/(69-87)   Height:  [152.4 cm (5')]   Weight:  [70.3 kg (155 lb)]   Pulse Ox:  [91 %-96 %]      Physical Exam  Vitals and nursing note reviewed.     CONSTITUTIONAL: Well appearing, well nourished, in no acute distress.   HENMT: Head atraumatic. Airway patent. Nasal mucosa clear. Mouth with normal mucosa, clear oropharynx. Uvula midline. Neck supple.    EYES: Clear bilaterally, pupils equally round and  reactive to light.   CARDIOVASCULAR: Normal rate, regular rhythm.  Heart sounds S1, S2.  No murmurs, rubs or gallops. Normal pulses. Capillary refill < 2 sec.   RESPIRATORY: No increased work of breathing.  Diminished air exchange throughout with scattered rhonchi and biphasic wheeze.  More diminished RLL.  GASTROINTESTINAL: Abdomen soft, non-distended, non-tender. No rebound, no guarding. Normal bowel sounds. No palpable masses.  GENITOURINARY:  No CVA tenderness.  MUSCULOSKELETAL: Spine appears normal, range of motion is not limited, no muscle or joint tenderness.  Trace bilateral lower extremity edema.  NEUROLOGICAL: Alert and oriented, no asymmetry, moving all extremities equally.  SKIN: Warm, dry and intact. No rash or notable lesions.  PSYCHIATRIC: Normal mood and affect.  HEME/LYMPH: No adenopathy or splenomegaly.    Diagnostic Results      ECG: ECGs read and interpreted by me. See ED Course, below, for interpretation.    Labs Reviewed   CBC WITH AUTO DIFFERENTIAL - Abnormal       Result Value    WBC 7.2      nRBC 0.0      RBC 3.90 (*)     Hemoglobin 11.9 (*)     Hematocrit 36.6      MCV 94      MCH 30.5      MCHC 32.5      RDW 13.1      Platelets 175      Neutrophils % 68.5      Immature Granulocytes %, Automated 1.3 (*)     Lymphocytes % 18.0      Monocytes % 8.8      Eosinophils % 2.8      Basophils % 0.6      Neutrophils Absolute 4.91      Immature Granulocytes Absolute, Automated 0.09      Lymphocytes Absolute 1.29      Monocytes Absolute 0.63      Eosinophils Absolute 0.20      Basophils Absolute 0.04     COMPREHENSIVE METABOLIC PANEL - Abnormal    Glucose 154 (*)     Sodium 141      Potassium 3.2 (*)     Chloride 100      Bicarbonate 31      Anion Gap 13      Urea Nitrogen 14      Creatinine 0.94      eGFR 64      Calcium 9.1      Albumin 3.9      Alkaline Phosphatase 77      Total Protein 6.4      AST 26      Bilirubin, Total 0.5      ALT 13     B-TYPE NATRIURETIC PEPTIDE - Abnormal     (*)      Narrative:        <100 pg/mL - Heart failure unlikely  100-299 pg/mL - Intermediate probability of acute heart                  failure exacerbation. Correlate with clinical                  context and patient history.    >=300 pg/mL - Heart Failure likely. Correlate with clinical                  context and patient history.    BNP testing is performed using different testing methodology at [facility] than at other [facility]. Direct result comparisons should only be made within the same method.      BLOOD GAS VENOUS FULL PANEL - Abnormal    POCT pH, Venous 7.40      POCT pCO2, Venous 57 (*)     POCT pO2, Venous 29 (*)     POCT SO2, Venous 45      POCT Oxy Hemoglobin, Venous 44.1 (*)     POCT Hematocrit Calculated, Venous 38.0      POCT Sodium, Venous 137      POCT Potassium, Venous 3.2 (*)     POCT Chloride, Venous 100      POCT Ionized Calicum, Venous 1.21      POCT Glucose, Venous 167 (*)     POCT Lactate, Venous 3.1 (*)     POCT Base Excess, Venous 8.6 (*)     POCT HCO3 Calculated, Venous 35.3 (*)     POCT Hemoglobin, Venous 12.8      POCT Anion Gap, Venous 5.0 (*)     Patient Temperature 37.0      FiO2 21     MAGNESIUM - Normal    Magnesium 1.79     SARS-COV-2 AND INFLUENZA A/B PCR - Normal    Flu A Result Not Detected      Flu B Result Not Detected      Coronavirus 2019, PCR Not Detected      Narrative:     This assay is an FDA-cleared, in vitro diagnostic nucleic acid amplification test for the qualitative detection and differentiation of SARS CoV-2/ Influenza A/B from nasopharyngeal specimens collected from individuals with signs and symptoms of respiratory tract infections, and has been validated for use at [facility]. Negative results do not preclude COVID-19/ Influenza A/B infections and should not be used as the sole basis for diagnosis, treatment, or other management decisions. Testing for SARS CoV-2 is recommended only for patients who meet current clinical  and/or epidemiological criteria defined by federal, state, or local public health directives.   SERIAL TROPONIN-INITIAL - Normal    Troponin I, High Sensitivity 8      Narrative:     Less than 99th percentile of normal range cutoff-  Female and children under 18 years old <14 ng/L; Male <21 ng/L: Negative  Repeat testing should be performed if clinically indicated.     Female and children under 18 years old 14-50 ng/L; Male 21-50 ng/L:  Consistent with possible cardiac damage and possible increased clinical   risk. Serial measurements may help to assess extent of myocardial damage.     >50 ng/L: Consistent with cardiac damage, increased clinical risk and  myocardial infarction. Serial measurements may help assess extent of   myocardial damage.      NOTE: Children less than 1 year old may have higher baseline troponin   levels and results should be interpreted in conjunction with the overall   clinical context.     NOTE: Troponin I testing is performed using a different   testing methodology at [facility] than at other   [facility]. Direct result comparisons should only   be made within the same method.   SERIAL TROPONIN, 1 HOUR - Normal    Troponin I, High Sensitivity 8      Narrative:     Less than 99th percentile of normal range cutoff-  Female and children under 18 years old <14 ng/L; Male <21 ng/L: Negative  Repeat testing should be performed if clinically indicated.     Female and children under 18 years old 14-50 ng/L; Male 21-50 ng/L:  Consistent with possible cardiac damage and possible increased clinical   risk. Serial measurements may help to assess extent of myocardial damage.     >50 ng/L: Consistent with cardiac damage, increased clinical risk and  myocardial infarction. Serial measurements may help assess extent of   myocardial damage.      NOTE: Children less than 1 year old may have higher baseline troponin   levels and results should be interpreted in conjunction with the overall    clinical context.     NOTE: Troponin I testing is performed using a different   testing methodology at [facility] than at other   [facility] hospitals. Direct result comparisons should only   be made within the same method.   BLOOD GAS LACTIC ACID, VENOUS - Normal    POCT Lactate, Venous 2.0     TROPONIN SERIES- (INITIAL, 1 HR)    Narrative:     The following orders were created for panel order Troponin I Series, High Sensitivity (0, 1 HR).  Procedure                               Abnormality         Status                     ---------                               -----------         ------                     Troponin I, High Sensiti...[[id number]]  Normal              Final result               Troponin, High Sensitivi...[[id number]]  Normal              Final result                 Please view results for these tests on the individual orders.   LACTATE         CT angio chest for pulmonary embolism   Final Result   1.  No evidence of pulmonary arterial embolism.   2.  Subsegmental atelectasis lingular segment left upper lobe.   3.  Distended fluid-filled esophagus to the level of the   gastroesophageal junction.  This finding has been present on previous   studies.  The possibly of achalasia must be considered.   The visualized liver, spleen and pancreas have a normal appearance.    There are no adrenal abnormalities.   Signed by Jovany Cotton DO      XR chest 2 views   Final Result   No acute cardiopulmonary process.        Findings suggestive of COPD.        MACRO:   None        Signed by: Prosper Chiu 6/4/2025 5:56 PM   Dictation workstation:   [id number]                    [place] Coma Scale Score: 15                  Procedure  Procedures    ED Course & MDM   Assessment/Plan:   Breonna Greenberg is a 74 y.o. female with a history of COPD on 2L NC as needed at home, hypothyroidism, NSCLC s/p resection/chemo, hyperdynamic LV with outflow obstruction, recurrent pneumonia, who presents with  shortness of breath, cough, hypoxia, and intermittent hemoptysis.  She is mildly hypoxic at 92%, otherwise hemodynamically stable and well-appearing.  She does have bilateral wheeze and diminished air exchange on exam raising the question of COPD exacerbation.  Possibly with superimposed pneumonia.  Her pulmonologist suggested her that she may have a PE although I suspect this is less likely.  Workup was initiated with ECG, labs, chest x-ray, and CT PE.  She was given 3 DuoNebs and Solu-Medrol. See below for details of ED course and ultimate disposition.    Medications   ipratropium-albuteroL (Duo-Neb) 0.5-2.5 mg/3 mL nebulizer solution 3 mL (3 mL nebulization Given 6/4/25 1811)   methylPREDNISolone sod succinate (SOLU-Medrol) injection 125 mg (125 mg intravenous Given 6/4/25 1735)   iohexol (OMNIPaque) 350 mg iodine/mL solution 75 mL (75 mL intravenous Given 6/4/25 1747)   potassium chloride (Klor-Con) packet 40 mEq (40 mEq oral Given 6/4/25 1845)        ED Course as of 06/04/25 2205 Wed Jun 04, 2025   1655 ECG read and interpreted by me.  Normal sinus rhythm, rate 80.  Normal axis.  Normal intervals.  LVH.  No significant ST or T wave derangements. [CG]   1803 Labs are notable for CBC without leukocytosis, mild anemia 11.9, normal platelets, CMP with hypokalemia 3.2 but normal magnesium 1.79, normal troponin, lactate, viral swabs.  BNP is mildly elevated 155.  Blood gas with normal pH and ionized calcium. [CG]   1804 Chest x-ray suggestive of COPD. [CG]   2006 CTPE no acute finding. Chronic issues as above.  [CG]   2204 Ambulatory pulse ox satisfactory at 90-93% [CG]   2204 Patient was reevaluated.  She is feeling much better.  She has remained hemodynamically stable on her home 2 L oxygen.  She is amenable to being discharged.  She was prescribed 5 days of prednisone and a Z-Edwin.  Patient was discharged home with anticipatory guidance and strict return precautions. [CG]      ED Course User Index  [CG] Genoveva  BASILIO Jarrett MD         Diagnoses as of 06/04/25 2205   COPD exacerbation (Multi)   Acute bronchitis, unspecified organism       Disposition:   DISCHARGE.  The patient was discharged with both verbal and written anticipatory guidance and strict return precautions. Discharge diagnosis, instructions and plan were discussed and understood. I emphasized the importance of following up with their primary care provider within 24-48 hours and with any referrals in the timeframe recommended. At the time of discharge the patient was comfortable and was in no apparent distress. Patient is aware of diagnostic uncertainty and was notified though testing is negative here, there is a very small chance that pathology may be missed.  The patient understands these risks and the patient/family understood to call 911 or return immediately to the emergency department if the symptoms worsen or if they have any additional concerns.      FOLLOW UP  Primary care provider in 1-2 days.      ED Prescriptions       Medication Sig Dispense Start Date End Date Auth. Provider    azithromycin (Zithromax) 250 mg tablet Take 2 tablets (500 mg) by mouth once daily for 1 day, THEN 1 tablet (250 mg) once daily for 4 days. 6 tablet 6/4/2025 6/9/2025 Genoveva Jarrett MD    predniSONE (Deltasone) 10 mg tablet Take 2 tablets (20 mg) by mouth once daily for 5 days. 10 tablet 6/4/2025 6/9/2025 MD Genoveva Quintana MD  EM/IM/Peds    This note was dictated by speech recognition. Minor errors in transcription may be present.       [1] No Known Allergies       Genoveva Jarrett MD  06/04/25 2205

## 2025-06-05 LAB
ATRIAL RATE: 80 BPM
P AXIS: 79 DEGREES
P OFFSET: 204 MS
P ONSET: 148 MS
PR INTERVAL: 150 MS
Q ONSET: 223 MS
QRS COUNT: 13 BEATS
QRS DURATION: 78 MS
QT INTERVAL: 406 MS
QTC CALCULATION(BAZETT): 468 MS
QTC FREDERICIA: 447 MS
R AXIS: -5 DEGREES
T AXIS: 18 DEGREES
T OFFSET: 426 MS
VENTRICULAR RATE: 80 BPM

## 2025-06-05 NOTE — DISCHARGE INSTRUCTIONS
You were seen today for COPD exacerbation, probable acute bronchitis.  We prescribed you steroids and a course of antibiotics.  Your evaluation was not concerning for an emergency at this time. Please see the attached information sheet for information about your condition, how to care for your condition at home, and reasons to return to the emergency department. Take any prescriptions written today as prescribed. You should call your primary care provider within 24 hours to tell them about today's visit, including any new medications or medication changes, as he or she may want to see you in the office for further evaluation. If you do not have a primary care provider, call  [phone] for an appointment. We offer in-person office visits as well as virtual options. Please do not hesitate to call   or return to the emergency department with any new or unresolved concerns or symptoms. Thank you for choosing [facility] for your care.

## 2025-06-10 ENCOUNTER — TELEPHONE (OUTPATIENT)
Dept: PULMONOLOGY | Facility: CLINIC | Age: 75
End: 2025-06-10
Payer: MEDICARE

## 2025-06-10 NOTE — TELEPHONE ENCOUNTER
Patient stated she went to the ER last week as suggested they gave her azithromycin 250mg and prednisone 10mg     She went for pre-op today and the nurse was concerned because she still could hear some crackling in her lungs when she breaths.    Please call to advise.

## 2025-06-18 ENCOUNTER — OFFICE VISIT (OUTPATIENT)
Dept: PULMONOLOGY | Facility: CLINIC | Age: 75
End: 2025-06-18
Payer: MEDICARE

## 2025-06-18 VITALS
BODY MASS INDEX: 30.86 KG/M2 | WEIGHT: 158 LBS | DIASTOLIC BLOOD PRESSURE: 89 MMHG | SYSTOLIC BLOOD PRESSURE: 135 MMHG | OXYGEN SATURATION: 94 % | RESPIRATION RATE: 18 BRPM | HEART RATE: 104 BPM | TEMPERATURE: 97.1 F

## 2025-06-18 DIAGNOSIS — R05.3 CHRONIC COUGH: Primary | ICD-10-CM

## 2025-06-18 DIAGNOSIS — J44.9 CHRONIC OBSTRUCTIVE PULMONARY DISEASE, UNSPECIFIED COPD TYPE (MULTI): ICD-10-CM

## 2025-06-18 DIAGNOSIS — J69.0 ASPIRATION PNEUMONIA OF BOTH LUNGS DUE TO REGURGITATED FOOD, UNSPECIFIED PART OF LUNG (MULTI): ICD-10-CM

## 2025-06-18 PROCEDURE — 99212 OFFICE O/P EST SF 10 MIN: CPT | Performed by: INTERNAL MEDICINE

## 2025-06-18 PROCEDURE — 1160F RVW MEDS BY RX/DR IN RCRD: CPT | Performed by: INTERNAL MEDICINE

## 2025-06-18 PROCEDURE — 1036F TOBACCO NON-USER: CPT | Performed by: INTERNAL MEDICINE

## 2025-06-18 PROCEDURE — 99214 OFFICE O/P EST MOD 30 MIN: CPT | Performed by: INTERNAL MEDICINE

## 2025-06-18 PROCEDURE — 1159F MED LIST DOCD IN RCRD: CPT | Performed by: INTERNAL MEDICINE

## 2025-06-18 RX ORDER — AMOXICILLIN AND CLAVULANATE POTASSIUM 875; 125 MG/1; MG/1
875 TABLET, FILM COATED ORAL 2 TIMES DAILY
Qty: 20 TABLET | Refills: 0 | Status: SHIPPED | OUTPATIENT
Start: 2025-06-18 | End: 2025-06-28

## 2025-06-18 ASSESSMENT — ENCOUNTER SYMPTOMS
FACIAL SWELLING: 0
SLEEP DISTURBANCE: 0
SPEECH DIFFICULTY: 0
EYE REDNESS: 0
ARTHRALGIAS: 0
NAUSEA: 0
FEVER: 0
COUGH: 1
CONSTIPATION: 0
APNEA: 0
LIGHT-HEADEDNESS: 0
DIFFICULTY URINATING: 0
CHOKING: 0
BRUISES/BLEEDS EASILY: 0
NERVOUS/ANXIOUS: 0
PALPITATIONS: 0
AGITATION: 0
SHORTNESS OF BREATH: 1
WEAKNESS: 0
FREQUENCY: 0
TREMORS: 0
ABDOMINAL DISTENTION: 0
SINUS PAIN: 0
STRIDOR: 0
DYSURIA: 0
HEADACHES: 0
RHINORRHEA: 0
NUMBNESS: 0
HEMATURIA: 0
FATIGUE: 0
UNEXPECTED WEIGHT CHANGE: 0
DIZZINESS: 0
JOINT SWELLING: 0
ADENOPATHY: 0
WHEEZING: 0
EYE DISCHARGE: 0
ABDOMINAL PAIN: 0
SINUS PRESSURE: 0

## 2025-06-18 NOTE — PROGRESS NOTES
@PULMONARY FOLLOW-UP@       PROBLEM:  bronchitis     ASSESSMENT:  The patient is a 74-year-old with a history of COPD lung cancer with hypothyroidism.  She has achalasia and dysphagia although she is eating small portions and not clinically aspirating.  She has a history of stage IIIa non-small cell carcinoma which is stable.  I suspect that she is silently aspirating developing increasing bronchitis and sputum production.  Her recent imaging did not demonstrate pneumonia.  Her lungs are clear on auscultation.  She needs to be treated with antibiotics after a culture of her sputum is collected  She may need chronic antibiotic suppression.    PLAN:  I am going to culture her sputum both for routine culture and AFB.  After sputum is collected I will begin Augmentin 875 twice daily.  Will await cultures and clinical response.  She may need to be antibiotic suppression chronically    I will consider stopping the inhaled corticosteroids because of the recurrent infections      HISTORY OF PRESENT ILLNESS:  This patient is 74-year-old with COPD and hypothyroidism with a history of lung cancer who was seen on March 30, 2022 elevated February of Complaining of shortness of breath over the last 4 months. After her right lower lobectomy she had atrial flutter and Pseudomonas pneumonia. She had the onset of shortness of breath over the previous 4 months without a definite exacerbation of COPD. Her PFTs performed on March 11, 2022 revealed a severely reduced DLCO but unchanged with a moderate degree of unchanged airflow obstruction. Her echocardiogram was unchanged with a mild elevation of her RVSP. With the relatively recent onset of shortness of breath I checked a D-dimer which was elevated. CFT angiogram revealed no PE on March 11, 2022. There was evidence of the previous right lower lobectomy. She was going to see cardiology for completeness.      She ended up having a repeat CT angiogram in May 26, 2022 which revealed no  PE. There was volume loss within the lingula which was new compared to the previous study. Also there was dilated esophagus with air-fluid levels and thickening distally associated with small hiatal hernia. Overall appearance similar to the recent prior. She did undergo an EGD on June 29, 2022. There is no evidence of any gastritis or H. pylori.     The CT scan from November 11, 2022 revealed the following: There has been interval clearing of previously seen sub-centimeter ground-glass centrilobular nodules in the mid aspect right upper lobe. However more inferiorly in the right upper lobe there has been interval development of peripheral predominant patchy ground-glass opacities and pleural based nodules (series 205 images [id number]). A few additional small peripheral ground-glass opacities in the right upper lobe (image 154/309), left upper lobe anterior segment (image 116/309), and left lower lobe along the major fissure (image 113/309) are new from prior. There has been an interval decrease in atelectasis in the lingula of the left upper lobe. There is mild-to-moderate apical predominant centrilobular emphysema.      As noted above her PFTs reveal moderate airflow obstruction with a significant bronchodilator response but because of her dyspnea she ended up seeing Dr. Carroll. It was felt that she had a hyperdynamic obstruction with a normal ejection fraction. Because of the outflow obstruction she was placed on metoprolol.      She was seen on November 21, 2022 and stated that about a month ago she developed increasing shortness of breath and congestion and was seen by a home care service and placed on prednisone and antibiotics. She got better initially but then had increasing shortness of breath with thick phlegm production. She ended up having the CT scan as outlined above on November 11, 2022 revealing the groundglass changes. Her emergency room assessment for COVID was negative. She continues to have thick  phlegm production and has tried Mucinex.  It was felt that the groundglass change could be related to a postviral illness.  Also potentially beta-blocker was potentiating her COPD.  I gave her a prednisone taper and albuterol nebulization.  A follow-up CT scan was to be obtained in 3 months..  She also had a COPD flare as was reported on December 20, 2022.     A follow-up CT scan from November 12, 2022 outlined the clearing of some parenchymal changes on the previous study but there were new opacities in other areas with the finding suggesting waxing waning of infectious or inflammatory process.  A follow-up study from 8/15/2023 outlined no suspicious nodularity.     She saw Dr. Carroll on September 21, 2023 and described increasing shortness of breath.  He had noted a hyperdynamic echocardiogram in 2022 and she was having some palpitation.  A repeat echocardiogram was to be obtained along with a monitor and she was continued on her beta-blocker.  Surrounding that visit she noted shortness of breath walking 20-30 steps.  She also had some nasal discharge.  Furthermore she saw GI on September 22, 2023 for esophageal reflux.     It was noted that she was admitted to the hospital October 4 through October 5, 2023 with increasing shortness of breath.  She was found to have a new 2.4 x 2.2 cm masslike opacity.  Her COPD was treated with corticosteroids and bronchodilators.  The echocardiogram from October 12, 2023 outlined hyperdynamic ventricle with ejection fraction of 77% with diastolic dysfunction.  The RVSP was normal.  The changes from the CT scan from October 4, 2023 during the hospitalization appeared postinflammatory in nature and she was placed on Augmentin as outlined by Dr. Iglesias.  There were also some additional areas of nodularity which had increased inferiorly in the right lung and the left upper lobe     October 24, 2023 it was reported that the patient finished the antibiotic about a week ago and this  began coughing again.  She is bringing up yellow phlegm.  She has a drip in her throat and congestion.  She has low-grade fevers.  No chest pains or pressures.  She has no chills.     The esophagram from November 15, 2023 revealed the following  1.  Diffuse dilatation of distal 2/3 of the esophagus below the level  of aortic arch with absence of peristalsis, which can be seen with  scleroderma in appropriate clinical setting. A degree of achalasia  can also be considered in the differential, however there is no  significant hold-up of contrast in the esophagus.  2. Linearly oriented fine mucosal folds in the dilated esophageal  segment could be related to scleroderma, however  esophagitis/Hawkins's esophagus secondary to gastroesophageal reflux  can also be considered in the differential. Recommend endoscopic and  histopathologic correlation.  3. Small hiatal hernia.     It was noted on February 19, 2024 that pulse oximetry as outlined saturations equal less than 88% for over 4 hours.  Nocturnal oxygen ministration was ordered.  PFTs from January 22, 2024 outlined an FVC of 1.96 L 83% predicted with an FEV1 of 0.90 L 49% predicted and FEV1 percent of  59%.  The FEV1 improved 7% after bronchodilators.  The TLC was 104% predicted the RV/TLC was 110% predicted and a DLCO was 48% predicted.  Overall the FEV1 was unchanged.  On the 6-minute walk she covered 256 m and desaturated from 93% down to 91%.  The walk distance was 63% predicted.     the CT scan of the chest from February 24, 2024 revealed the following  Improved patchiness in the right lower lobe.      There is consolidation and collapse in the left upper lobe. This may  represent scarring.      Stable nodules in the left lower lobe.      Coronary artery disease.      Dilated fluid-filled esophagus.      Her achalasia has been managed by GI and other than small meals and referral to speech therapy there are no other options.     On May 22, 2024 it was reported  that the patient intermittently has been having phlegm production and she was better when humidity was low.  She did note some shortness of breath walking into the exam room.  She had some increasing cough and congestion several weeks ago but it seems to resolve.  She is eating slower and having 4 meals per day.  She has not had any choking episodes.  She has not had any fevers or chills.  She notes that the albuterol does help when she uses a nebulizer.  She is on Anoro.     I summarized on May 22, 2024 how she had COPD, hypothyroidism, lung cancer with a hyperdynamic left ventricle with outflow obstruction.  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  There has been no evidence of recurrence and her main issue of late has been aspiration probably from achalasia.  For the latter there is no further medical treatment other than small meals speech therapy etc.  She has not been to the latter of late.  It should be appreciated that several weeks ago she had increasing cough and congestion and so a chest x-ray was obtained.  There is a faint infiltrate in the lingular on the lateral film.  For completeness we will put her on a course of Augmentin.  She does have an upcoming CT scan in several months.  She was given a course of Augmentin     The CT scan from 2024 revealed the following  1. Stable postoperative changes right lower lobectomy with stable  atelectasis.  2. Slight interval increase bronchial wall thickening and scattered  bilateral ground-glass opacities, likely secondary to atypical  pneumonia.  3. Interval decrease in the mixed patchy opacities in the lingula,  since prior.     She presented to the emergency room on 2025 with increasing shortness of breath with coughing and sputum production.  She was around various individuals who have been sick having been at a .  She had no evidence of acute infection  or PE and was suspected to have a flare of COPD and was treated for such.  She was given azithromycin and prednisone     The CT angiogram from January 30, 2025 revealed the following  1. No evidence of acute pulmonary embolism.  2. Abnormal esophagus which appears to be distended and filled with  fluid and air-fluid level. An esophageal pathology and  gastroesophageal reflux should be considered. Recommend nonemergent  esophagram and or EGD to further evaluate.  3. Hyperinflation with findings of likely smaller right disease as  described and similar to the prior exam.     On February 10, 2025 it was noted, the patient has noted chronic shortness of breath with intermittent episodes of bronchitis and congestion.  She did go to the emergency room with increasing shortness of breath couple weeks ago and no PE was found.  There were no findings of pneumonia.  She does have a dilated esophagus  and she has an upcoming appointment with GI.  She did go for a second opinion at the [facility] regarding her COPD and really nothing was changed other than suggesting going on a Trelegy inhaler instead of Anoro.  She feels that over the last year she has continued shortness of breath and it may be somewhat worse.  She Short of Breath with Activities of Daily Living.     I summarized on February 10, 2025 that the patient  is a 74-year with a history of COPD, hypothyroidism, lung cancer with a hyperdynamic left ventricle with outflow obstruction. 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  There has been no evidence of recurrence and her main issue of late has been aspiration probably from achalasia.  Of late, she has not had any pneumonia but is complaining of shortness of breath.  She has moderate to severe airflow obstruction with an FEV1 of around 49% predicted or 0.90 L and an FEV1/FVC ratio 46%.  Her DLCO was 48% predicted but she did not  desaturate in the past with exercise.  Probably rechecking a pulm function tests are in order.  Currently she is on Anoro.  She has had no pulm embolization on her recent CT angiogram.  There is no evidence of aspiration but she has a dilated esophagus and is going to see GI.     On March 12, 2025 I summarized all pulmonary function test outlined an FVC of 1.57 L 67% digit with an FEV1 of 0.6 cm 37% predicted and an FEV1 percent of 43%.  After bronchodilators the FEV1 improved 26%.  0.85 L or 47%.  2.  The TLC was 102% but the the DLCO was 8.02 mL/min/mmHg.  Her airflow obstruction is clearly worse her DLCO is about the same.  She has more air trapping.  I am going to add budesonide nebulization 0.5 mg per 2 mL to her regimen twice daily.  She is also on Trelegy daily.  She will continue with albuterol as needed via HFA or nebulizer.  She will get back to me next month.     At the time of the office visit on April 28, 2025 it was noted, the patient reports that since Easter she has had increasing congestion and shortness of breath.  She has not had a lot of coughing or sputum production.  She has not had any aspiration.  She finds that she eats slowly she does better.  She has no swelling of her legs.  She continues on her Trelegy and albuterol which she is using twice a day.  She has a nebulizer at home.  She has no swelling of the legs.  She was thinking about going to the emergency room over the last several weeks but did not do so.  She stated that in February she went with shortness of breath and nothing was done.    On April 28, 2025 I summarized how the patient is a 74-year with a history of COPD, hypothyroidism, lung cancer with a hyperdynamic left ventricle with outflow obstruction. 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  There has been no evidence of recurrence and her main issue of late has been aspiration probably from achalasia.   She has severe airflow obstruction which is worsening of late with a severely reduced DLCO.  She presents with increasing shortness of breath without a whole lot of cough and congestion.  Her CT angiogram reveals no PE but she has got some groundglass infiltrative changes which may be old in part but clearly she probably is having exacerbation of COPD accounting for her worsening manifestation.  She also will be treated for a component of pneumonia.I am going to place her on a combination of Augmentin and azithromycin.  I am going to put her on a prednisone taper.  She will use her albuterol nebulization and continue with her other controller medications.  She will keep me posted how she is doing over the next several days    She was seen in the emergency room on June 4, 2025 with increasing shortness of breath and hypoxemia.  She had some intermittent hemoptysis and she had no evidence of pneumonia.  Her CT angiogram revealed no PE with atelectasis in the lingula and distended esophagus.  She was treated for COPD exacerbation.  She received a combination of some steroids and azithromycin.    Currently, the patient reports that she has started developing increasing cough and congestion bringing up a lot of purulent phlegm.  She continues on the budesonide nebulization along with albuterol nebulization along with Trelegy inhaler.  As soon as she got off the antibiotics the sputum production has increased again and she feels more short of breath and limited.  She is utilizing oxygen more frequently.  She uses it during the day as needed at night continually.  She has not aspirated anything of late.    RX Allergies[1]       Current Medications[2]          Review of Systems   Constitutional:  Negative for fatigue, fever and unexpected weight change.   HENT:  Negative for congestion, facial swelling, nosebleeds, postnasal drip, rhinorrhea, sinus pressure and sinus pain.    Eyes:  Negative for discharge, redness and  visual disturbance.   Respiratory:  Positive for cough and shortness of breath. Negative for apnea, choking, wheezing and stridor.    Cardiovascular:  Negative for chest pain, palpitations and leg swelling.   Gastrointestinal:  Negative for abdominal distention, abdominal pain, constipation and nausea.   Endocrine: Negative for cold intolerance and heat intolerance.   Genitourinary:  Negative for difficulty urinating, dysuria, frequency and hematuria.   Musculoskeletal:  Negative for arthralgias, gait problem and joint swelling.   Allergic/Immunologic: Negative for environmental allergies, food allergies and immunocompromised state.   Neurological:  Negative for dizziness, tremors, syncope, speech difficulty, weakness, light-headedness, numbness and headaches.   Hematological:  Negative for adenopathy. Does not bruise/bleed easily.   Psychiatric/Behavioral:  Negative for agitation, behavioral problems and sleep disturbance. The patient is not nervous/anxious.         Vitals:    06/18/25 0803   BP: 135/89   Pulse: 104   Resp: 18   Temp: 36.2 °C (97.1 °F)   SpO2: 94%        Physical Exam  Vitals reviewed.   Constitutional:       Appearance: Normal appearance.   HENT:      Head: Normocephalic and atraumatic.   Eyes:      Extraocular Movements: Extraocular movements intact.   Cardiovascular:      Rate and Rhythm: Normal rate and regular rhythm.      Heart sounds: No murmur heard.     No friction rub. No gallop.   Pulmonary:      Effort: Pulmonary effort is normal. No respiratory distress.      Breath sounds: No stridor. No wheezing, rhonchi or rales.      Comments: Decreased breath sounds without rales rhonchi wheezes  Chest:      Chest wall: No tenderness.   Abdominal:      General: Abdomen is flat. There is no distension.      Palpations: Abdomen is soft. There is no mass.      Tenderness: There is no abdominal tenderness.   Musculoskeletal:         General: Normal range of motion.      Cervical back: Normal range of  motion.      Right lower leg: No edema.      Left lower leg: No edema.   Skin:     General: Skin is warm and dry.   Neurological:      Mental Status: She is alert and oriented to person, place, and time.   Psychiatric:         Mood and Affect: Mood normal.         Behavior: Behavior normal.               [1] No Known Allergies  [2]   Current Outpatient Medications:     albuterol sulfate (Proair Digihaler) 90 mcg/actuation aero powdr breath act w/sensor inhaler, Inhale 2 puffs every 6 hours if needed for wheezing., Disp: , Rfl:     allopurinol (Zyloprim) 100 mg tablet, TAKE 1 TABLET BY MOUTH ONCE  DAILY, Disp: 100 tablet, Rfl: 2    b complex 0.4 mg tablet, Take 1 tablet by mouth once daily., Disp: , Rfl:     budesonide (Pulmicort) 0.5 mg/2 mL nebulizer solution, Take 2 mL (0.5 mg) by nebulization 2 times a day. Rinse mouth with water after use to reduce aftertaste and incidence of candidiasis. Do not swallow., Disp: 120 mL, Rfl: 11    cholecalciferol, vitD3,/vit K2 (vitamin D3-vitamin K2) 125 mcg (5,000 unit)-100 mcg capsule, Take by mouth., Disp: , Rfl:     DULoxetine (Cymbalta) 30 mg DR capsule, Take 1 capsule (30 mg) by mouth once daily., Disp: 90 capsule, Rfl: 1    fluocinonide (Lidex) 0.05 % external solution, Apply topically 2 times a day as needed for irritation or rash., Disp: 60 mL, Rfl: 1    fluticasone (Flonase) 50 mcg/actuation nasal spray, Administer 1 spray into each nostril once daily. Shake gently. Before first use, prime pump. After use, clean tip and replace cap., Disp: 16 g, Rfl: 3    gabapentin (Neurontin) 300 mg capsule, TAKE 1 CAPSULE BY MOUTH TWICE  DAILY, Disp: 200 capsule, Rfl: 2    levothyroxine (Synthroid, Levoxyl) 150 mcg tablet, Take 1 tablet (150 mcg) by mouth early in the morning.. Take on an empty stomach at the same time each day, either 30 to 60 minutes prior to breakfast, Disp: 30 tablet, Rfl: 1    metoprolol succinate XL (Toprol-XL) 100 mg 24 hr tablet, Take 1 tablet (100 mg) by  mouth once daily., Disp: 90 tablet, Rfl: 3    omeprazole (PriLOSEC) 40 mg DR capsule, TAKE 1 CAPSULE BY MOUTH ONCE  DAILY (DO NOT CRUSH OR CHEW), Disp: 100 capsule, Rfl: 2    oxybutynin (Ditropan) 5 mg tablet, TAKE 1 TABLET BY MOUTH ONCE  DAILY, Disp: 90 tablet, Rfl: 3    rosuvastatin (Crestor) 20 mg tablet, Take 1 tablet (20 mg) by mouth once daily., Disp: 90 tablet, Rfl: 3    Trelegy Ellipta 100-62.5-25 mcg blister with device, Inhale 1 puff once daily., Disp: , Rfl:     albuterol 2.5 mg /3 mL (0.083 %) nebulizer solution, Take 3 mL (2.5 mg) by nebulization every 6 hours., Disp: 1080 mL, Rfl: 3    amoxicillin-clavulanate (Augmentin) 875-125 mg tablet, Take 1 tablet (875 mg of amoxicillin) by mouth 2 times a day for 10 days., Disp: 20 tablet, Rfl: 0    Current Facility-Administered Medications:     oxygen (O2) therapy, , inhalation, Continuous - Inhalation, Lori Bolivar MD, Last Rate: 120,000 mL/hr at 01/30/25 1133, 2 L/min at 01/30/25 1133

## 2025-06-18 NOTE — PATIENT INSTRUCTIONS
Will obtain sputum for routine and AFB culture    After collected begin Augmentin twice a day    Call in several weeks and we will decide on chronic antibiotic treatment

## 2025-06-22 LAB — BACTERIA SPT CULT: ABNORMAL

## 2025-06-23 ENCOUNTER — DOCUMENTATION (OUTPATIENT)
Dept: PULMONOLOGY | Facility: HOSPITAL | Age: 75
End: 2025-06-23
Payer: MEDICARE

## 2025-06-23 DIAGNOSIS — J69.0 ASPIRATION PNEUMONIA OF BOTH LUNGS DUE TO REGURGITATED FOOD, UNSPECIFIED PART OF LUNG (MULTI): ICD-10-CM

## 2025-06-23 DIAGNOSIS — J44.9 CHRONIC OBSTRUCTIVE PULMONARY DISEASE, UNSPECIFIED COPD TYPE (MULTI): Primary | ICD-10-CM

## 2025-06-23 RX ORDER — LEVOFLOXACIN 500 MG/1
500 TABLET, FILM COATED ORAL EVERY 24 HOURS
Qty: 10 TABLET | Refills: 0 | Status: SHIPPED | OUTPATIENT
Start: 2025-06-23 | End: 2025-07-03

## 2025-06-23 NOTE — PROGRESS NOTES
Patient is growing Pseudomonas in her culture and further copious amounts of purulent phlegm we will place her on Cipro.  She will get back to me over the next several weeks.    Will go with Levaquin instead of Cipro because Cipro interferes with Cymbalta that she is on

## 2025-07-08 ENCOUNTER — APPOINTMENT (OUTPATIENT)
Dept: PRIMARY CARE | Facility: CLINIC | Age: 75
End: 2025-07-08
Payer: MEDICARE

## 2025-07-08 VITALS
SYSTOLIC BLOOD PRESSURE: 119 MMHG | DIASTOLIC BLOOD PRESSURE: 75 MMHG | HEART RATE: 99 BPM | WEIGHT: 159 LBS | TEMPERATURE: 97.1 F | BODY MASS INDEX: 31.05 KG/M2

## 2025-07-08 DIAGNOSIS — K22.4 ESOPHAGEAL DYSMOTILITY: ICD-10-CM

## 2025-07-08 DIAGNOSIS — D64.9 ANEMIA, UNSPECIFIED TYPE: ICD-10-CM

## 2025-07-08 DIAGNOSIS — E03.9 ACQUIRED HYPOTHYROIDISM: ICD-10-CM

## 2025-07-08 DIAGNOSIS — E87.6 HYPOKALEMIA: ICD-10-CM

## 2025-07-08 DIAGNOSIS — Z00.00 ENCOUNTER FOR PREVENTATIVE ADULT HEALTH CARE EXAMINATION: ICD-10-CM

## 2025-07-08 DIAGNOSIS — J43.9 PULMONARY EMPHYSEMA, UNSPECIFIED EMPHYSEMA TYPE (MULTI): ICD-10-CM

## 2025-07-08 DIAGNOSIS — J44.9 CHRONIC OBSTRUCTIVE PULMONARY DISEASE, UNSPECIFIED COPD TYPE (MULTI): Primary | ICD-10-CM

## 2025-07-08 DIAGNOSIS — E78.00 HYPERCHOLESTEROLEMIA: ICD-10-CM

## 2025-07-08 PROCEDURE — 1160F RVW MEDS BY RX/DR IN RCRD: CPT | Performed by: INTERNAL MEDICINE

## 2025-07-08 PROCEDURE — 1159F MED LIST DOCD IN RCRD: CPT | Performed by: INTERNAL MEDICINE

## 2025-07-08 PROCEDURE — 1036F TOBACCO NON-USER: CPT | Performed by: INTERNAL MEDICINE

## 2025-07-08 PROCEDURE — 99214 OFFICE O/P EST MOD 30 MIN: CPT | Performed by: INTERNAL MEDICINE

## 2025-07-08 NOTE — PATIENT INSTRUCTIONS
VISIT SUMMARY:  Today, we discussed your ongoing respiratory issues, including recurrent pneumonia and worsening COPD symptoms. We also reviewed your atrial fibrillation and hyperlipidemia management, and addressed general health maintenance needs.    YOUR PLAN:  RECURRENT ASPIRATION PNEUMONIA: You have recurrent pneumonia likely due to aspiration, with a history of unusual bacterial infections.  -Discuss long-term low-dose antibiotic prophylaxis with Dr. Field.  -Consider a referral to palliative care for symptom management and quality of life improvement.  -We will order blood work to check your thyroid function, potassium levels, and for anemia.  -A CT scan is scheduled for August as part of your ongoing oncologist visits.    CHRONIC OBSTRUCTIVE PULMONARY DISEASE (COPD): Your COPD symptoms have worsened, causing significant shortness of breath and requiring frequent use of supplemental oxygen.  -Consider a referral to palliative care for comprehensive symptom management.  -Discuss increasing your daytime oxygen supply with Dr. Field.      HYPERLIPIDEMIA: You have elevated cholesterol levels and are taking Zetia without any side effects.  -We will order a lipid panel to assess your current cholesterol levels.    GENERAL HEALTH MAINTENANCE: You are due for a mammogram and tetanus booster. You have received your COVID booster, RSV, pneumococcal, and shingles vaccines.  -We will order a mammogram.  -If it has been six months since your last COVID booster, we recommend getting another one.  -Discuss the tetanus booster as a non-urgent option.    GOALS OF CARE: You are open to palliative care for improving your quality of life and managing symptoms.  -We will refer you to palliative care for multidisciplinary symptom management.    FOLLOW-UP: You have a CT scan scheduled in August and a follow-up appointment in six months for your annual visit.  -Schedule a follow-up appointment in six months for your annual  visit.

## 2025-07-08 NOTE — PROGRESS NOTES
"Subjective   Patient ID: Breonna Rivas" is a 74 y.o. female who presents for Follow-up.  History of Present Illness  Breonna Rivas" is a 74 year old female with recurrent pneumonia and COPD who presents with ongoing respiratory issues.    She experiences recurrent pneumonia, with the latest hospitalization in June, and has had unusual bacterial infections, including pseudomonas, treated with Levaquin. Despite recent antibiotics, she anticipates another pneumonia episode soon. Hospitalizations occurred in February, April, and June.    Her COPD has worsened, causing significant dyspnea. She cannot walk from her car to the building without resting. She uses oxygen at night and frequently during the day, especially with pneumonia symptoms. She completed pulmonary rehab after surgery but has not participated recently. Daily activities are challenging, and she rarely goes out.    She has atrial fibrillation managed with metoprolol for a valve issue and takes Zetia for elevated cholesterol without side effects. She experiences extreme fatigue and frequent sleepiness, attributing it to recurrent pneumonia.    PMHx:   - Esophageal dysphagia (solids and liquids) hiatal hernia and GERD, retained esophageal fluid and aspiration pneumonia history- Diffuse dilatation of distal esophagus and ?achalasia. Recommended smaller meals and slower foods. One regular adult meal may last 2 days for her. Despite this, she notes difficulty with losing weight.   On PPI, not recommended surgical intervention.  - Lung cancer (NSCLC - [facility]) s/p right lung resection 2020, complicated by tumor abutting the right atrium, post op pneumonia and post operative pericarditis s/p 4 cycles of adjuvant cisplatin/docetaxel with G-CSF support completed 12/20, seen by onc Dr. Iglesias recommended surveillance every 6 months   - COPD - advanced, dyspnea on exertion with hyperdynamic LV function and small LV cavity- followed by Dr. Field and now " Dr. Ta switched to Trelegy from Anoro albuterol and nebulizers, using nocturnal oxygen 2 L.  Was contemplating a move to [place], recommended consideration for sarcopenia clinic  - Insomnia -no response to trazodone or melatonin recommended trial of CBT-I, did try it but only did the training for one night.   - Memory concerns - recommend consideration for neuropsych testing  - Osteoporosis - alendronate stopped 3/24 for drug holiday after 5 years  - Hypothyroidism - on levothyroxine 150 mcg takes concurrently with PPI  - Paroxysmal Afib -  precipitated by surgery followed by Dr. Carroll and Dr. Ramos.   - CAD -  LV tract outflow obstruction -  followed by cardiology on metoprolol increased to 100 by Dr. Carroll in November  - HTN - on metoprolol  mg  - HLD - on rosuvastatin 20 switch from atorvastatin, goal LDL less than 70  - Fibromyalgia (and chest wall pain) , depression and anxiety - followed by pain management on cymbalta 30mg and gabapentin 300 BID   -Gout and right knee OA -  s/p steroid injection with Dr. Cheng on  allopurinol and voltaren, seen by Dr. Ospina thought related to OA   - Urge incontinence - on oxybutynin 5mg improved  - Iron deficiency - without anemia recommended increased iron intake  - Pruritus at flexural areas - has had extensive workup in the past ultimately prescribed fluocinonide 0.05%      Social;   - Lives at home alone   - 3 children, oldest daughter a  in [place], middle daughter an  in [place] (, undergoing IVF), youngest son a  in [place]., has a friend who she speaks to twice a day who has concerns abut her memory  - No granchildren, has granddogs.   - Niece an OB/GYN at [facility]   - Started getting back involved with the senior center.     PMHx, FHx, Social Hx, Surg Hx personally reviewed at this appointment. No pertinent findings and/or changes from prior (if applicable).      Objective      /75   Pulse 99   Temp 36.2 °C (97.1 °F) (Temporal)   Wt 72.1 kg (159 lb)   BMI 31.05 kg/m²    General: Appears comfortable, NAD, appropriate affect, speaking full sentences   HEENT: NCAT, EOMI, pupils symmetric, no conjunctival erythema   Heart: RRR S1 S2 no murmurs appreciated   Lungs: reduced lung sounds, otherwise clear    Extremities: no cyanosis or edema appreciated  Neuro: AAO x 3, answers questions appropriately, no FND      Lab Results   Component Value Date    WBC 7.2 06/04/2025    HGB 11.9 (L) 06/04/2025    HCT 36.6 06/04/2025     06/04/2025    CHOL 213 (H) 02/20/2025    TRIG 82 02/20/2025    HDL 82 02/20/2025    ALT 13 06/04/2025    AST 26 06/04/2025     06/04/2025    K 3.2 (L) 06/04/2025     06/04/2025    CREATININE 0.94 06/04/2025    BUN 14 06/04/2025    CO2 31 06/04/2025    TSH 82.33 (H) 02/20/2025    INR 0.9 08/23/2022    HGBA1C 5.2 04/21/2022         Current Outpatient Medications   Medication Instructions    albuterol sulfate (Proair Digihaler) 90 mcg/actuation aero powdr breath act w/sensor inhaler 2 puffs, Every 6 hours PRN    albuterol 2.5 mg, nebulization, Every 6 hours    allopurinol (ZYLOPRIM) 100 mg, oral, Daily    b complex 0.4 mg tablet 1 tablet, Daily    budesonide (PULMICORT) 0.5 mg, nebulization, 2 times daily, Rinse mouth with water after use to reduce aftertaste and incidence of candidiasis. Do not swallow.    cholecalciferol, vitD3,/vit K2 (vitamin D3-vitamin K2) 125 mcg (5,000 unit)-100 mcg capsule Take by mouth.    DULoxetine (CYMBALTA) 30 mg, oral, Daily    fluocinonide (Lidex) 0.05 % external solution Topical, 2 times daily PRN    fluticasone (Flonase) 50 mcg/actuation nasal spray 1 spray, Each Nostril, Daily, Shake gently. Before first use, prime pump. After use, clean tip and replace cap.    gabapentin (NEURONTIN) 300 mg, oral, 2 times daily    levothyroxine (SYNTHROID, LEVOXYL) 150 mcg, oral, Daily, Take on an empty stomach at the same time each  day, either 30 to 60 minutes prior to breakfast    metoprolol succinate XL (TOPROL-XL) 100 mg, oral, Daily    omeprazole (PRILOSEC) 40 mg, oral, Daily, (DO NOT CRUSH OR CHEW)    oxyBUTYnin (DITROPAN) 5 mg, oral, Daily    rosuvastatin (CRESTOR) 20 mg, oral, Daily    Trelegy Ellipta 100-62.5-25 mcg blister with device 1 puff, Daily RT        ECG 12 lead  Normal sinus rhythm  Minimal voltage criteria for LVH, may be normal variant ( R in aVL )  Inferior infarct (cited on or before 30-JAN-2025)  Abnormal ECG  When compared with ECG of 29-APR-2025 14:17,  No significant change was found  See ED provider note for full interpretation and clinical correlation  Confirmed by Ayde Roberson ([id number]) on 6/14/2025 5:13:45 PM           Assessment & Plan  Recurrent Aspiration Pneumonia  Recurrent pneumonia due to aspiration, likely esophageal-related. Previous Pseudomonas infection treated with Levaquin. Considering long-term low-dose antibiotic prophylaxis, but concerned about resistance. Declined feeding tube.  - Discuss long-term low-dose antibiotic prophylaxis with Dr. Field.  - Consider palliative care referral for symptom management and quality of life improvement.  - Order blood work for thyroid function, potassium levels, and anemia.  - Order CT scan in August for ongoing oncologist visits.    Chronic Obstructive Pulmonary Disease (COPD)  Worsening symptoms with significant exertional dyspnea, requiring frequent supplemental oxygen. Completed pulmonary rehab. Palliative care discussed for symptom management.  - Consider palliative care referral for comprehensive symptom management.  - Discuss increased daytime oxygen supply    Atrial Fibrillation  On metoprolol for rate control due to atrial fibrillation associated with valve issue.    Hyperlipidemia  Elevated lipid levels, on Zetia for LDL reduction. No side effects reported.  - Order lipid panel to assess current cholesterol levels.    General Health  Maintenance  Due for mammogram and tetanus booster. Received COVID booster, RSV, pneumococcal, and shingles vaccines. Emphasized importance of regular screenings and vaccinations.  - Order mammogram.  - Recommend COVID booster if six months since last dose.  - Discuss tetanus booster as a non-urgent option.    Goals of Care  Open to palliative care for quality of life improvement and symptom management. Desires to maintain current quality of life.   - Refer to palliative care for multidisciplinary symptom management.    Follow-up  Scheduled for CT scan in August for ongoing oncologist visits.  - Schedule follow-up appointment in six months for annual visit.      AWV in 6 months     Gloria Mitchell DO       This medical note was created with the assistance of artificial intelligence (AI) for documentation purposes. The content has been reviewed and confirmed by the healthcare provider for accuracy and completeness. Patient consented to the use of audio recording and use of AI during their visit.

## 2025-07-10 ENCOUNTER — TELEPHONE (OUTPATIENT)
Dept: PULMONOLOGY | Facility: CLINIC | Age: 75
End: 2025-07-10
Payer: MEDICARE

## 2025-07-10 ENCOUNTER — DOCUMENTATION (OUTPATIENT)
Dept: PULMONOLOGY | Facility: HOSPITAL | Age: 75
End: 2025-07-10
Payer: MEDICARE

## 2025-07-10 DIAGNOSIS — J69.0 ASPIRATION PNEUMONIA OF BOTH LUNGS DUE TO REGURGITATED FOOD, UNSPECIFIED PART OF LUNG (MULTI): ICD-10-CM

## 2025-07-10 DIAGNOSIS — J44.9 CHRONIC OBSTRUCTIVE PULMONARY DISEASE, UNSPECIFIED COPD TYPE (MULTI): ICD-10-CM

## 2025-07-10 RX ORDER — LEVOFLOXACIN 500 MG/1
500 TABLET, FILM COATED ORAL EVERY 24 HOURS
Qty: 7 TABLET | Refills: 0 | Status: SHIPPED | OUTPATIENT
Start: 2025-07-10 | End: 2025-07-17

## 2025-07-10 NOTE — TELEPHONE ENCOUNTER
Patient sts she finished the abx and would like to discuss with you how she is feeling at this time. Please call

## 2025-07-10 NOTE — PROGRESS NOTES
Doing better with less copious sputum production.  Will give another week of the Levaquin.  She is not 100% yet.

## 2025-08-29 ENCOUNTER — HOSPITAL ENCOUNTER (OUTPATIENT)
Dept: RADIOLOGY | Facility: CLINIC | Age: 75
Discharge: HOME | End: 2025-08-29
Payer: MEDICARE

## 2025-08-29 DIAGNOSIS — C34.31 PRIMARY CANCER OF RIGHT LOWER LOBE OF LUNG (MULTI): ICD-10-CM

## 2025-08-29 PROCEDURE — 71250 CT THORAX DX C-: CPT

## 2025-09-03 ENCOUNTER — OFFICE VISIT (OUTPATIENT)
Dept: HEMATOLOGY/ONCOLOGY | Facility: CLINIC | Age: 75
End: 2025-09-03
Payer: MEDICARE

## 2025-09-03 VITALS
DIASTOLIC BLOOD PRESSURE: 71 MMHG | SYSTOLIC BLOOD PRESSURE: 119 MMHG | OXYGEN SATURATION: 92 % | HEART RATE: 67 BPM | BODY MASS INDEX: 30.15 KG/M2 | TEMPERATURE: 97.9 F | WEIGHT: 154.4 LBS

## 2025-09-03 DIAGNOSIS — Z85.118 ENCOUNTER FOR FOLLOW-UP SURVEILLANCE OF LUNG CANCER: ICD-10-CM

## 2025-09-03 DIAGNOSIS — C34.31 PRIMARY CANCER OF RIGHT LOWER LOBE OF LUNG (MULTI): Primary | ICD-10-CM

## 2025-09-03 DIAGNOSIS — Z08 ENCOUNTER FOR FOLLOW-UP SURVEILLANCE OF LUNG CANCER: ICD-10-CM

## 2025-09-03 DIAGNOSIS — E03.9 HYPOTHYROIDISM, UNSPECIFIED TYPE: ICD-10-CM

## 2025-09-03 PROCEDURE — 1126F AMNT PAIN NOTED NONE PRSNT: CPT | Performed by: NURSE PRACTITIONER

## 2025-09-03 PROCEDURE — 1159F MED LIST DOCD IN RCRD: CPT | Performed by: NURSE PRACTITIONER

## 2025-09-03 PROCEDURE — 99213 OFFICE O/P EST LOW 20 MIN: CPT | Performed by: NURSE PRACTITIONER

## 2025-09-03 ASSESSMENT — PAIN SCALES - GENERAL: PAINLEVEL_OUTOF10: 0-NO PAIN

## 2026-01-08 ENCOUNTER — APPOINTMENT (OUTPATIENT)
Dept: PRIMARY CARE | Facility: CLINIC | Age: 76
End: 2026-01-08
Payer: MEDICARE